# Patient Record
Sex: FEMALE | Race: WHITE | NOT HISPANIC OR LATINO | Employment: PART TIME | URBAN - METROPOLITAN AREA
[De-identification: names, ages, dates, MRNs, and addresses within clinical notes are randomized per-mention and may not be internally consistent; named-entity substitution may affect disease eponyms.]

---

## 2017-03-24 ENCOUNTER — APPOINTMENT (OUTPATIENT)
Dept: PREADMISSION TESTING | Facility: HOSPITAL | Age: 47
End: 2017-03-24

## 2017-03-24 VITALS
OXYGEN SATURATION: 100 % | HEART RATE: 65 BPM | TEMPERATURE: 97.8 F | RESPIRATION RATE: 16 BRPM | BODY MASS INDEX: 24.34 KG/M2 | DIASTOLIC BLOOD PRESSURE: 81 MMHG | WEIGHT: 160.6 LBS | SYSTOLIC BLOOD PRESSURE: 121 MMHG | HEIGHT: 68 IN

## 2017-03-24 LAB
BASOPHILS # BLD AUTO: 0.04 10*3/MM3 (ref 0–0.2)
BASOPHILS NFR BLD AUTO: 0.5 % (ref 0–1.5)
DEPRECATED RDW RBC AUTO: 44 FL (ref 37–54)
EOSINOPHIL # BLD AUTO: 0.05 10*3/MM3 (ref 0–0.7)
EOSINOPHIL NFR BLD AUTO: 0.6 % (ref 0.3–6.2)
ERYTHROCYTE [DISTWIDTH] IN BLOOD BY AUTOMATED COUNT: 12.8 % (ref 11.7–13)
HCT VFR BLD AUTO: 40.4 % (ref 35.6–45.5)
HGB BLD-MCNC: 13.5 G/DL (ref 11.9–15.5)
IMM GRANULOCYTES # BLD: 0.03 10*3/MM3 (ref 0–0.03)
IMM GRANULOCYTES NFR BLD: 0.3 % (ref 0–0.5)
LYMPHOCYTES # BLD AUTO: 2.37 10*3/MM3 (ref 0.9–4.8)
LYMPHOCYTES NFR BLD AUTO: 26.8 % (ref 19.6–45.3)
MCH RBC QN AUTO: 32.1 PG (ref 26.9–32)
MCHC RBC AUTO-ENTMCNC: 33.4 G/DL (ref 32.4–36.3)
MCV RBC AUTO: 96 FL (ref 80.5–98.2)
MONOCYTES # BLD AUTO: 0.56 10*3/MM3 (ref 0.2–1.2)
MONOCYTES NFR BLD AUTO: 6.3 % (ref 5–12)
NEUTROPHILS # BLD AUTO: 5.78 10*3/MM3 (ref 1.9–8.1)
NEUTROPHILS NFR BLD AUTO: 65.5 % (ref 42.7–76)
PLATELET # BLD AUTO: 276 10*3/MM3 (ref 140–500)
PMV BLD AUTO: 10.7 FL (ref 6–12)
RBC # BLD AUTO: 4.21 10*6/MM3 (ref 3.9–5.2)
WBC NRBC COR # BLD: 8.83 10*3/MM3 (ref 4.5–10.7)

## 2017-03-24 PROCEDURE — 85025 COMPLETE CBC W/AUTO DIFF WBC: CPT | Performed by: OBSTETRICS & GYNECOLOGY

## 2017-03-24 PROCEDURE — 36415 COLL VENOUS BLD VENIPUNCTURE: CPT

## 2017-03-24 RX ORDER — NORETHINDRONE ACETATE AND ETHINYL ESTRADIOL .03; 1.5 MG/1; MG/1
1 TABLET ORAL DAILY
COMMUNITY
End: 2021-08-20

## 2017-03-24 RX ORDER — LEVOTHYROXINE SODIUM 0.03 MG/1
25 TABLET ORAL DAILY
COMMUNITY
End: 2022-02-02 | Stop reason: SDUPTHER

## 2017-03-24 RX ORDER — ROPINIROLE 0.5 MG/1
1 TABLET, FILM COATED ORAL NIGHTLY
COMMUNITY
End: 2021-08-20

## 2017-03-24 NOTE — DISCHARGE INSTRUCTIONS
General Instructions:  • Do not eat or drink after midnight: includes water, mints, or gum. You may brush your teeth.  • Do not smoke, chew tobacco, or drink alcohol.  • The Pre-Admission Testing nurse will instruct you to bring medications if unable to obtain an accurate list in Pre-Admission Testing.    • If applicable bring your C-PAP/ BI-PAP machine.  • Bring any papers given to you in the doctor’s office.  • Wear clean comfortable clothes and socks.  • Do not wear contact lenses or make-up.  Bring a case for your glasses if applicable.   • Bring crutches or walker if applicable.  • Leave all other valuables and jewelry at home.    If you were given a blood bank ID arm band remember to bring it with you the day of surgery.    Preventing a Surgical Site Infection:  Shower on the morning of surgery using a fresh bar of anti-bacterial soap (such as Dial) and clean washcloth.  Dry with a clean towel and dress in clean clothing.  For 2 to 3 days before surgery, avoid shaving with a razor near where you will have surgery because the razor can irritate skin and make it easier to develop an infection  Ask your surgeon if you will be receiving antibiotics prior to surgery  Make sure you, your family, and all healthcare providers clean their hands with soap and water or an alcohol based hand  before caring for you or your wound  If at all possible, quit smoking as many days before surgery as you can.    Day of surgery:  Upon arrival, a Pre-op nurse and Anesthesiologist will review your health history, obtain vital signs, and answer questions you may have.  The only belongings needed at this time will be your home medications and if applicable your C-PAP/BI-PAP machine.  If you are staying overnight your family can leave the rest of your belongings in the car and bring them to your room later.  A Pre-op nurse will start an IV and you may receive medication in preparation for surgery, including something to  help you relax.  Your family will be able to see you in the Pre-op area.  While you are in surgery your family should notify the waiting room  if they leave the waiting room area and provide a contact phone number.    Please be aware that surgery does come with discomfort.  We want to make every effort to control your discomfort so please discuss any uncontrolled symptoms with your nurse.   Your doctor will most likely have prescribed pain medications.      If you are going home after surgery you will receive individualized written care instructions before being discharged.  A responsible adult must drive you to and from the hospital on the day of your surgery and stay with you for 24 hours.    If you are staying overnight following surgery, you will be transported to your hospital room following the recovery period.  [facility] has all private rooms.    If you have any questions please call Pre-Admission Testing at [phone].  Deductibles and co-payments are collected on the day of service. Please be prepared to pay the required co-pay, deductible or deposit on the day of service as defined by your plan.

## 2017-04-02 NOTE — H&P
HISTORY OF PRESENT ILLNESS: Patient is a 47-year-old female, G2, P0, A1, ectopic 1 with hysteroscopically documented high-grade cervical dysplasia.  Plan is for LEEP conization.     ALLERGIES:  1.  LATEX.   2.  SULFA.   3.  BACITRACIN.    4.  CODEINE.    5.  CORTISONE.    6.  FORMALDEHYDE.    7.  POLYMYXIN.     PAST SURGICAL HISTORY:  1.  Endometrial ablation.    2.  Diagnostic laparoscopy.    3.  Treatment of an ectopic pregnancy.   4.  Tubal ligation.     CURRENT MEDICATIONS:   1.  Gildess 1.5/30.  2.  Synthroid 25 mcg daily.     PHYSICAL EXAMINATION:  VITAL SIGNS: Blood pressure is 116/62. Weight is 161 pounds at 5 feet 8 inches for a body mass index of 24.   LUNGS: Clear.   HEART: Regular rate without murmur.   ABDOMEN: Soft. No masses.   PELVIC: Normal external genitalia. Cervix is nulliparous in appearance. Uterus is midplane. No adnexal masses. Rectovaginal is confirmatory.     IMPRESSION: High-grade cervical dysplasia.     PLAN: LEEP conization. The patient understands all of the risks and benefits of surgery. She understands the risk of bleeding, infection, injury to bowel or bladder. She accepts these risks and wishes to proceed.       Oly Rivera M.D. MC:deyvi  D:   04/02/2017 17:51:09  T:   04/02/2017 19:17:08  Job ID:   [id number]  Document ID:   [id number]  cc:   OPT Surgery OPT Surgery  [facility]

## 2017-04-03 ENCOUNTER — ANESTHESIA EVENT (OUTPATIENT)
Dept: PERIOP | Facility: HOSPITAL | Age: 47
End: 2017-04-03

## 2017-04-03 ENCOUNTER — HOSPITAL ENCOUNTER (OUTPATIENT)
Facility: HOSPITAL | Age: 47
Setting detail: HOSPITAL OUTPATIENT SURGERY
Discharge: HOME | End: 2017-04-03
Attending: OBSTETRICS & GYNECOLOGY | Admitting: OBSTETRICS & GYNECOLOGY

## 2017-04-03 ENCOUNTER — ANESTHESIA (OUTPATIENT)
Dept: PERIOP | Facility: HOSPITAL | Age: 47
End: 2017-04-03

## 2017-04-03 VITALS
OXYGEN SATURATION: 97 % | DIASTOLIC BLOOD PRESSURE: 81 MMHG | TEMPERATURE: 97.6 F | HEART RATE: 50 BPM | RESPIRATION RATE: 16 BRPM | SYSTOLIC BLOOD PRESSURE: 122 MMHG

## 2017-04-03 DIAGNOSIS — R87.613 HIGH GRADE SQUAMOUS INTRAEPITHELIAL CERVICAL DYSPLASIA: ICD-10-CM

## 2017-04-03 DIAGNOSIS — D06.9 SEVERE CERVICAL DYSPLASIA, HISTOLOGICALLY CONFIRMED: Primary | ICD-10-CM

## 2017-04-03 LAB
B-HCG UR QL: NEGATIVE
INTERNAL NEGATIVE CONTROL: NEGATIVE
INTERNAL POSITIVE CONTROL: POSITIVE
Lab: NORMAL

## 2017-04-03 PROCEDURE — 25010000002 FENTANYL CITRATE (PF) 100 MCG/2ML SOLUTION: Performed by: NURSE ANESTHETIST, CERTIFIED REGISTERED

## 2017-04-03 PROCEDURE — 25010000002 ROPIVACAINE PER 1 MG: Performed by: OBSTETRICS & GYNECOLOGY

## 2017-04-03 PROCEDURE — 25010000002 KETOROLAC TROMETHAMINE PER 15 MG: Performed by: NURSE ANESTHETIST, CERTIFIED REGISTERED

## 2017-04-03 PROCEDURE — 25010000002 MIDAZOLAM PER 1 MG: Performed by: ANESTHESIOLOGY

## 2017-04-03 PROCEDURE — 88305 TISSUE EXAM BY PATHOLOGIST: CPT | Performed by: OBSTETRICS & GYNECOLOGY

## 2017-04-03 PROCEDURE — 88307 TISSUE EXAM BY PATHOLOGIST: CPT | Performed by: OBSTETRICS & GYNECOLOGY

## 2017-04-03 PROCEDURE — 25010000002 PROPOFOL 10 MG/ML EMULSION: Performed by: NURSE ANESTHETIST, CERTIFIED REGISTERED

## 2017-04-03 RX ORDER — PROMETHAZINE HYDROCHLORIDE 25 MG/ML
5 INJECTION, SOLUTION INTRAMUSCULAR; INTRAVENOUS
Status: DISCONTINUED | OUTPATIENT
Start: 2017-04-03 | End: 2017-04-04 | Stop reason: HOSPADM

## 2017-04-03 RX ORDER — HYDRALAZINE HYDROCHLORIDE 20 MG/ML
5 INJECTION INTRAMUSCULAR; INTRAVENOUS
Status: DISCONTINUED | OUTPATIENT
Start: 2017-04-03 | End: 2017-04-04 | Stop reason: HOSPADM

## 2017-04-03 RX ORDER — MIDAZOLAM HYDROCHLORIDE 1 MG/ML
1 INJECTION INTRAMUSCULAR; INTRAVENOUS
Status: DISCONTINUED | OUTPATIENT
Start: 2017-04-03 | End: 2017-04-03 | Stop reason: HOSPADM

## 2017-04-03 RX ORDER — FENTANYL CITRATE 50 UG/ML
INJECTION, SOLUTION INTRAMUSCULAR; INTRAVENOUS AS NEEDED
Status: DISCONTINUED | OUTPATIENT
Start: 2017-04-03 | End: 2017-04-03 | Stop reason: SURG

## 2017-04-03 RX ORDER — ROPIVACAINE HYDROCHLORIDE 5 MG/ML
INJECTION, SOLUTION EPIDURAL; INFILTRATION; PERINEURAL AS NEEDED
Status: DISCONTINUED | OUTPATIENT
Start: 2017-04-03 | End: 2017-04-03 | Stop reason: HOSPADM

## 2017-04-03 RX ORDER — PROMETHAZINE HYDROCHLORIDE 25 MG/1
12.5 TABLET ORAL ONCE AS NEEDED
Status: DISCONTINUED | OUTPATIENT
Start: 2017-04-03 | End: 2017-04-04 | Stop reason: HOSPADM

## 2017-04-03 RX ORDER — LABETALOL HYDROCHLORIDE 5 MG/ML
5 INJECTION, SOLUTION INTRAVENOUS
Status: DISCONTINUED | OUTPATIENT
Start: 2017-04-03 | End: 2017-04-04 | Stop reason: HOSPADM

## 2017-04-03 RX ORDER — SODIUM CHLORIDE 0.9 % (FLUSH) 0.9 %
1-10 SYRINGE (ML) INJECTION AS NEEDED
Status: DISCONTINUED | OUTPATIENT
Start: 2017-04-03 | End: 2017-04-03 | Stop reason: HOSPADM

## 2017-04-03 RX ORDER — FENTANYL CITRATE 50 UG/ML
50 INJECTION, SOLUTION INTRAMUSCULAR; INTRAVENOUS
Status: DISCONTINUED | OUTPATIENT
Start: 2017-04-03 | End: 2017-04-04 | Stop reason: HOSPADM

## 2017-04-03 RX ORDER — PROMETHAZINE HYDROCHLORIDE 25 MG/ML
12.5 INJECTION, SOLUTION INTRAMUSCULAR; INTRAVENOUS ONCE AS NEEDED
Status: DISCONTINUED | OUTPATIENT
Start: 2017-04-03 | End: 2017-04-04 | Stop reason: HOSPADM

## 2017-04-03 RX ORDER — DIPHENHYDRAMINE HYDROCHLORIDE 50 MG/ML
12.5 INJECTION INTRAMUSCULAR; INTRAVENOUS
Status: DISCONTINUED | OUTPATIENT
Start: 2017-04-03 | End: 2017-04-04 | Stop reason: HOSPADM

## 2017-04-03 RX ORDER — IBUPROFEN 600 MG/1
600 TABLET ORAL EVERY 6 HOURS PRN
Status: DISCONTINUED | OUTPATIENT
Start: 2017-04-03 | End: 2017-04-04 | Stop reason: HOSPADM

## 2017-04-03 RX ORDER — SODIUM CHLORIDE, SODIUM LACTATE, POTASSIUM CHLORIDE, CALCIUM CHLORIDE 600; 310; 30; 20 MG/100ML; MG/100ML; MG/100ML; MG/100ML
INJECTION, SOLUTION INTRAVENOUS CONTINUOUS PRN
Status: DISCONTINUED | OUTPATIENT
Start: 2017-04-03 | End: 2017-04-03 | Stop reason: SURG

## 2017-04-03 RX ORDER — ONDANSETRON 2 MG/ML
4 INJECTION INTRAMUSCULAR; INTRAVENOUS ONCE AS NEEDED
Status: DISCONTINUED | OUTPATIENT
Start: 2017-04-03 | End: 2017-04-04 | Stop reason: HOSPADM

## 2017-04-03 RX ORDER — HYDROMORPHONE HYDROCHLORIDE 1 MG/ML
0.5 INJECTION, SOLUTION INTRAMUSCULAR; INTRAVENOUS; SUBCUTANEOUS
Status: DISCONTINUED | OUTPATIENT
Start: 2017-04-03 | End: 2017-04-04 | Stop reason: HOSPADM

## 2017-04-03 RX ORDER — FENTANYL CITRATE 50 UG/ML
50 INJECTION, SOLUTION INTRAMUSCULAR; INTRAVENOUS
Status: DISCONTINUED | OUTPATIENT
Start: 2017-04-03 | End: 2017-04-03 | Stop reason: HOSPADM

## 2017-04-03 RX ORDER — MIDAZOLAM HYDROCHLORIDE 1 MG/ML
2 INJECTION INTRAMUSCULAR; INTRAVENOUS
Status: DISCONTINUED | OUTPATIENT
Start: 2017-04-03 | End: 2017-04-03 | Stop reason: HOSPADM

## 2017-04-03 RX ORDER — PROMETHAZINE HYDROCHLORIDE 25 MG/1
25 SUPPOSITORY RECTAL ONCE AS NEEDED
Status: DISCONTINUED | OUTPATIENT
Start: 2017-04-03 | End: 2017-04-04 | Stop reason: HOSPADM

## 2017-04-03 RX ORDER — FAMOTIDINE 10 MG/ML
20 INJECTION, SOLUTION INTRAVENOUS ONCE
Status: COMPLETED | OUTPATIENT
Start: 2017-04-03 | End: 2017-04-03

## 2017-04-03 RX ORDER — PROMETHAZINE HYDROCHLORIDE 25 MG/1
25 TABLET ORAL ONCE AS NEEDED
Status: DISCONTINUED | OUTPATIENT
Start: 2017-04-03 | End: 2017-04-04 | Stop reason: HOSPADM

## 2017-04-03 RX ORDER — LIDOCAINE HYDROCHLORIDE 20 MG/ML
INJECTION, SOLUTION INFILTRATION; PERINEURAL AS NEEDED
Status: DISCONTINUED | OUTPATIENT
Start: 2017-04-03 | End: 2017-04-03 | Stop reason: SURG

## 2017-04-03 RX ORDER — SODIUM CHLORIDE, SODIUM LACTATE, POTASSIUM CHLORIDE, CALCIUM CHLORIDE 600; 310; 30; 20 MG/100ML; MG/100ML; MG/100ML; MG/100ML
9 INJECTION, SOLUTION INTRAVENOUS CONTINUOUS
Status: DISCONTINUED | OUTPATIENT
Start: 2017-04-03 | End: 2017-04-04 | Stop reason: HOSPADM

## 2017-04-03 RX ORDER — NALOXONE HCL 0.4 MG/ML
0.2 VIAL (ML) INJECTION AS NEEDED
Status: DISCONTINUED | OUTPATIENT
Start: 2017-04-03 | End: 2017-04-04 | Stop reason: HOSPADM

## 2017-04-03 RX ORDER — PROPOFOL 10 MG/ML
VIAL (ML) INTRAVENOUS AS NEEDED
Status: DISCONTINUED | OUTPATIENT
Start: 2017-04-03 | End: 2017-04-03 | Stop reason: SURG

## 2017-04-03 RX ORDER — KETOROLAC TROMETHAMINE 30 MG/ML
INJECTION, SOLUTION INTRAMUSCULAR; INTRAVENOUS AS NEEDED
Status: DISCONTINUED | OUTPATIENT
Start: 2017-04-03 | End: 2017-04-03 | Stop reason: SURG

## 2017-04-03 RX ORDER — FLUMAZENIL 0.1 MG/ML
0.2 INJECTION INTRAVENOUS AS NEEDED
Status: DISCONTINUED | OUTPATIENT
Start: 2017-04-03 | End: 2017-04-04 | Stop reason: HOSPADM

## 2017-04-03 RX ADMIN — PROPOFOL 150 MG: 10 INJECTION, EMULSION INTRAVENOUS at 07:08

## 2017-04-03 RX ADMIN — FAMOTIDINE 20 MG: 10 INJECTION, SOLUTION INTRAVENOUS at 06:59

## 2017-04-03 RX ADMIN — KETOROLAC TROMETHAMINE 30 MG: 30 INJECTION, SOLUTION INTRAMUSCULAR; INTRAVENOUS at 07:19

## 2017-04-03 RX ADMIN — LIDOCAINE HYDROCHLORIDE 100 MG: 20 INJECTION, SOLUTION INFILTRATION; PERINEURAL at 07:08

## 2017-04-03 RX ADMIN — FENTANYL CITRATE 50 MCG: 50 INJECTION INTRAMUSCULAR; INTRAVENOUS at 07:08

## 2017-04-03 RX ADMIN — SODIUM CHLORIDE, POTASSIUM CHLORIDE, SODIUM LACTATE AND CALCIUM CHLORIDE: 600; 310; 30; 20 INJECTION, SOLUTION INTRAVENOUS at 06:11

## 2017-04-03 RX ADMIN — SODIUM CHLORIDE, POTASSIUM CHLORIDE, SODIUM LACTATE AND CALCIUM CHLORIDE 1000 ML: 600; 310; 30; 20 INJECTION, SOLUTION INTRAVENOUS at 06:58

## 2017-04-03 RX ADMIN — MIDAZOLAM 2 MG: 1 INJECTION INTRAMUSCULAR; INTRAVENOUS at 06:58

## 2017-04-03 NOTE — PLAN OF CARE
Problem: Patient Care Overview (Adult)  Goal: Plan of Care Review  Outcome: Ongoing (interventions implemented as appropriate)    04/03/17 0548   Coping/Psychosocial Response Interventions   Plan Of Care Reviewed With patient   Patient Care Overview   Progress improving       Goal: Adult Individualization and Mutuality  Outcome: Ongoing (interventions implemented as appropriate)  Goal: Discharge Needs Assessment  Outcome: Ongoing (interventions implemented as appropriate)    04/03/17 0548   Discharge Needs Assessment   Concerns To Be Addressed no discharge needs identified

## 2017-04-03 NOTE — ANESTHESIA POSTPROCEDURE EVALUATION
Patient: Mercy Dalton    Procedure Summary     Date Anesthesia Start Anesthesia Stop Room / Location    04/03/17 0702 0738  [facility]  /  [facility]       Procedure Diagnosis Surgeon Provider    LOOP ELECTROCAUTERY EXCISION PROCEDURE (N/A Cervix) No diagnosis on file. MD Yolanda Tyler MD          Anesthesia Type: general  Last vitals  /81 (04/03/17 0835)    Temp 36.4 °C (97.6 °F) (04/03/17 0815)    Pulse 50 (04/03/17 0835)   Resp 16 (04/03/17 0835)    SpO2 97 % (04/03/17 0835)      Post Anesthesia Care and Evaluation    Patient location during evaluation: bedside  Patient participation: complete - patient participated  Level of consciousness: awake and alert  Pain management: adequate  Airway patency: patent  Anesthetic complications: No anesthetic complications    Cardiovascular status: acceptable  Respiratory status: acceptable  Hydration status: acceptable

## 2017-04-03 NOTE — PLAN OF CARE
Problem: Patient Care Overview (Adult)  Goal: Plan of Care Review  Outcome: Ongoing (interventions implemented as appropriate)    04/03/17 0800   Coping/Psychosocial Response Interventions   Plan Of Care Reviewed With patient   Patient Care Overview   Progress improving   Outcome Evaluation   Outcome Summary/Follow up Plan Vital signs stable,pain free, taking po fluids       Goal: Adult Individualization and Mutuality  Outcome: Ongoing (interventions implemented as appropriate)  Goal: Discharge Needs Assessment  Outcome: Ongoing (interventions implemented as appropriate)    04/03/17 0800   Discharge Needs Assessment   Concerns To Be Addressed no discharge needs identified

## 2017-04-03 NOTE — PLAN OF CARE
Problem: Perioperative Period (Adult)  Goal: Signs and Symptoms of Listed Potential Problems Will be Absent or Manageable (Perioperative Period)  Outcome: Ongoing (interventions implemented as appropriate)    04/03/17 0800   Perioperative Period   Problems Assessed (Perioperative Period) all   Problems Present (Perioperative Period) none

## 2017-04-03 NOTE — OP NOTE
Date of Procedure:  04/03/2017    PREOPERATIVE DIAGNOSIS: High grade cervical dysplasia.     POSTOPERATIVE DIAGNOSIS: High grade cervical dysplasia.     PROCEDURE PERFORMED: Loop electrosurgical excision procedure conization.    SURGEON: Oly Rivera MD     ANESTHESIA: General laryngeal mask.    COMPLICATIONS: None.     FINDINGS: Parous-appearing cervix.    DESCRIPTION OF PROCEDURE: After adequate general laryngeal mask anesthesia, the patient was placed in the dorsal lithotomy supine position and prepped and draped in the usual fashion for a vaginal procedure. Speculum was placed in the vagina. A long Allis was placed on the anterior lip of the cervix. The blue wire loop was used on 100 romero of cutting power to remove the cone-shaped specimen that was 4 cm in diameter and 1 cm in depth. Sharp curettage of the endocervix was undertaken. The cone bed was cauterized. There was still some bleeding. Therefore, 2 Sturmdorf sutures were placed of 0 chromic and Surgicel was placed in the cone bed and the 2 sutures were tied across themselves. This resulted in adequate hemostasis. Then, 10 mL of 0.5% Naropin was instilled in the cervix at the 12, 3, and 9 oclock positions for a total of 30 mL. Estimated blood loss was 20 mL.    COMPLICATIONS: None.      Oly Rivera M.D. MC:jessa  D:   04/03/2017 07:36:52  T:   04/03/2017 10:54:42  Job ID:   [id number]  Document ID:   [id number]  cc:

## 2017-04-03 NOTE — BRIEF OP NOTE
LOOP ELECTROCAUTERY EXCISION PROCEDURE  Procedure Note    Mercy Dalton  4/3/2017    Pre-op Diagnosis:   hgsil    Post-op Diagnosis:     same    Procedure/CPT® Codes:      Procedure(s):  LOOP ELECTROCAUTERY EXCISION PROCEDURE, ECC    Surgeon(s):  Oly Rivera MD    Anesthesia: General    Staff:   Circulator: Cheryl Lei RN  Scrub Person: Shawn Kimbrough    Estimated Blood Loss: 20cc  Urine Voided: zero    Specimens:                  ID Type Source Tests Collected by Time Destination   A : endometrial curretage Tissue Endometrial Curettings TISSUE EXAM Oly Rivera MD 4/3/2017 0721    B :  Tissue Cervix TISSUE EXAM Oly Rivera MD 4/3/2017 0726          Drains:           Findings: parous cervix    Complications: none      Oly Rivera MD     Date: 4/3/2017  Time: 7:26 AM

## 2017-04-03 NOTE — ANESTHESIA PROCEDURE NOTES
Airway  Urgency: elective    Difficult airway    General Information and Staff    Patient location during procedure: OR  Anesthesiologist: ZEYAD MCNEAL  CRNA: TAWNY POMPA    Indications and Patient Condition  Indications for airway management: airway protection    Preoxygenated: yes  MILS maintained throughout  Mask difficulty assessment: 1 - vent by mask    Final Airway Details  Final airway type: supraglottic airway      Successful airway: ProSeal  Size 4    Number of attempts at approach: 1    Additional Comments  Smooth iv induction with no complications

## 2017-04-03 NOTE — ANESTHESIA PREPROCEDURE EVALUATION
Anesthesia Evaluation     Patient summary reviewed   NPO Status: > 8 hours   Airway   Mallampati: II  no difficulty expected  Dental - normal exam     Pulmonary - normal exam   Cardiovascular - normal exam        Neuro/Psych  GI/Hepatic/Renal/Endo    (+)  hypothyroidism,     Musculoskeletal     Abdominal    Substance History      OB/GYN          Other                                    Anesthesia Plan    ASA 2     general     Anesthetic plan and risks discussed with patient.

## 2017-04-03 NOTE — PLAN OF CARE
Problem: Perioperative Period (Adult)  Goal: Signs and Symptoms of Listed Potential Problems Will be Absent or Manageable (Perioperative Period)  Outcome: Ongoing (interventions implemented as appropriate)    04/03/17 0562   Perioperative Period   Problems Assessed (Perioperative Period) all   Problems Present (Perioperative Period) none

## 2017-04-04 LAB
CYTO UR: NORMAL
LAB AP CASE REPORT: NORMAL
Lab: NORMAL
PATH REPORT.FINAL DX SPEC: NORMAL
PATH REPORT.GROSS SPEC: NORMAL

## 2018-01-08 ENCOUNTER — CONVERSION ENCOUNTER (OUTPATIENT)
Dept: GENERAL RADIOLOGY | Facility: HOSPITAL | Age: 48
End: 2018-01-08

## 2018-04-13 ENCOUNTER — OFFICE VISIT CONVERTED (OUTPATIENT)
Dept: FAMILY MEDICINE CLINIC | Facility: CLINIC | Age: 48
End: 2018-04-13
Attending: NURSE PRACTITIONER

## 2018-06-27 ENCOUNTER — OFFICE VISIT CONVERTED (OUTPATIENT)
Dept: FAMILY MEDICINE CLINIC | Facility: CLINIC | Age: 48
End: 2018-06-27
Attending: NURSE PRACTITIONER

## 2018-07-12 ENCOUNTER — CONVERSION ENCOUNTER (OUTPATIENT)
Dept: NEUROLOGY | Facility: CLINIC | Age: 48
End: 2018-07-12

## 2018-07-12 ENCOUNTER — OFFICE VISIT CONVERTED (OUTPATIENT)
Dept: NEUROLOGY | Facility: CLINIC | Age: 48
End: 2018-07-12
Attending: PSYCHIATRY & NEUROLOGY

## 2018-09-24 ENCOUNTER — OFFICE VISIT CONVERTED (OUTPATIENT)
Dept: PLASTIC SURGERY | Facility: CLINIC | Age: 48
End: 2018-09-24
Attending: PLASTIC SURGERY

## 2018-10-15 ENCOUNTER — OFFICE VISIT CONVERTED (OUTPATIENT)
Dept: NEUROLOGY | Facility: CLINIC | Age: 48
End: 2018-10-15
Attending: PSYCHIATRY & NEUROLOGY

## 2018-10-25 ENCOUNTER — PROCEDURE VISIT CONVERTED (OUTPATIENT)
Dept: PLASTIC SURGERY | Facility: CLINIC | Age: 48
End: 2018-10-25
Attending: PLASTIC SURGERY

## 2018-11-01 ENCOUNTER — CONVERSION ENCOUNTER (OUTPATIENT)
Dept: PLASTIC SURGERY | Facility: CLINIC | Age: 48
End: 2018-11-01

## 2018-11-01 ENCOUNTER — OFFICE VISIT CONVERTED (OUTPATIENT)
Dept: PLASTIC SURGERY | Facility: CLINIC | Age: 48
End: 2018-11-01
Attending: PLASTIC SURGERY

## 2019-02-14 ENCOUNTER — OFFICE VISIT CONVERTED (OUTPATIENT)
Dept: NEUROLOGY | Facility: CLINIC | Age: 49
End: 2019-02-14
Attending: PHYSICIAN ASSISTANT

## 2019-03-07 ENCOUNTER — HOSPITAL ENCOUNTER (OUTPATIENT)
Dept: OTHER | Facility: HOSPITAL | Age: 49
Discharge: HOME | End: 2019-03-07
Attending: NURSE PRACTITIONER

## 2019-03-07 ENCOUNTER — HOSPITAL ENCOUNTER (OUTPATIENT)
Dept: OTHER | Facility: HOSPITAL | Age: 49
Discharge: HOME | End: 2019-03-07

## 2019-03-07 LAB
ALBUMIN SERPL-MCNC: 4.1 G/DL (ref 3.5–5)
ALBUMIN/GLOB SERPL: 1.3 {RATIO} (ref 1.4–2.6)
ALP SERPL-CCNC: 40 U/L (ref 42–98)
ALT SERPL-CCNC: 16 U/L (ref 10–40)
ANION GAP SERPL CALC-SCNC: 14 MMOL/L (ref 8–19)
AST SERPL-CCNC: 39 U/L (ref 15–50)
BASOPHILS # BLD AUTO: 0.07 10*3/UL (ref 0–0.2)
BASOPHILS NFR BLD AUTO: 1.1 % (ref 0–3)
BILIRUB SERPL-MCNC: 0.5 MG/DL (ref 0.2–1.3)
BUN SERPL-MCNC: 12 MG/DL (ref 5–25)
BUN/CREAT SERPL: 16 {RATIO} (ref 6–20)
CALCIUM SERPL-MCNC: 9.5 MG/DL (ref 8.7–10.4)
CHLORIDE SERPL-SCNC: 103 MMOL/L (ref 99–111)
CHOLEST SERPL-MCNC: 181 MG/DL (ref 107–200)
CHOLEST/HDLC SERPL: 3.1 {RATIO} (ref 3–6)
CONV ABS IMM GRAN: 0.02 10*3/UL (ref 0–0.2)
CONV CO2: 27 MMOL/L (ref 22–32)
CONV IMMATURE GRAN: 0.3 % (ref 0–1.8)
CONV TOTAL PROTEIN: 7.2 G/DL (ref 6.3–8.2)
CREAT UR-MCNC: 0.73 MG/DL (ref 0.5–0.9)
DEPRECATED RDW RBC AUTO: 43.7 FL (ref 36.4–46.3)
EOSINOPHIL # BLD AUTO: 0.1 10*3/UL (ref 0–0.7)
EOSINOPHIL # BLD AUTO: 1.5 % (ref 0–7)
ERYTHROCYTE [DISTWIDTH] IN BLOOD BY AUTOMATED COUNT: 12.1 % (ref 11.7–14.4)
FERRITIN SERPL-MCNC: 77 NG/ML (ref 10–200)
FOLATE SERPL-MCNC: 13.4 NG/ML (ref 4.8–20)
GFR SERPLBLD BASED ON 1.73 SQ M-ARVRAT: >60 ML/MIN/{1.73_M2}
GLOBULIN UR ELPH-MCNC: 3.1 G/DL (ref 2–3.5)
GLUCOSE SERPL-MCNC: 96 MG/DL (ref 65–99)
HBA1C MFR BLD: 13.2 G/DL (ref 12–16)
HCT VFR BLD AUTO: 40.5 % (ref 37–47)
HDLC SERPL-MCNC: 59 MG/DL (ref 40–60)
IRON SATN MFR SERPL: 51 % (ref 20–55)
IRON SERPL-MCNC: 189 UG/DL (ref 60–170)
LDLC SERPL CALC-MCNC: 108 MG/DL (ref 70–100)
LYMPHOCYTES # BLD AUTO: 2.3 10*3/UL (ref 1–5)
MCH RBC QN AUTO: 31.8 PG (ref 27–31)
MCHC RBC AUTO-ENTMCNC: 32.6 G/DL (ref 33–37)
MCV RBC AUTO: 97.6 FL (ref 81–99)
MONOCYTES # BLD AUTO: 0.51 10*3/UL (ref 0.2–1.2)
MONOCYTES NFR BLD AUTO: 7.8 % (ref 3–10)
NEUTROPHILS # BLD AUTO: 3.54 10*3/UL (ref 2–8)
NEUTROPHILS NFR BLD AUTO: 54.1 % (ref 30–85)
NRBC CBCN: 0 % (ref 0–0.7)
OSMOLALITY SERPL CALC.SUM OF ELEC: 288 MOSM/KG (ref 273–304)
PLATELET # BLD AUTO: 265 10*3/UL (ref 130–400)
PMV BLD AUTO: 11.6 FL (ref 9.4–12.3)
POTASSIUM SERPL-SCNC: 4.5 MMOL/L (ref 3.5–5.3)
RBC # BLD AUTO: 4.15 10*6/UL (ref 4.2–5.4)
SODIUM SERPL-SCNC: 139 MMOL/L (ref 135–147)
T4 FREE SERPL-MCNC: 1.1 NG/DL (ref 0.9–1.8)
TIBC SERPL-MCNC: 369 UG/DL (ref 245–450)
TRANSFERRIN SERPL-MCNC: 258 MG/DL (ref 250–380)
TRIGL SERPL-MCNC: 71 MG/DL (ref 40–150)
TSH SERPL-ACNC: 2.53 M[IU]/L (ref 0.27–4.2)
VARIANT LYMPHS NFR BLD MANUAL: 35.2 % (ref 20–45)
VIT B12 SERPL-MCNC: 984 PG/ML (ref 211–911)
VLDLC SERPL-MCNC: 14 MG/DL (ref 5–37)
WBC # BLD AUTO: 6.54 10*3/UL (ref 4.8–10.8)

## 2019-04-01 ENCOUNTER — HOSPITAL ENCOUNTER (OUTPATIENT)
Dept: GENERAL RADIOLOGY | Facility: HOSPITAL | Age: 49
Discharge: HOME | End: 2019-04-01
Attending: FAMILY MEDICINE

## 2019-04-01 ENCOUNTER — OFFICE VISIT CONVERTED (OUTPATIENT)
Dept: FAMILY MEDICINE CLINIC | Facility: CLINIC | Age: 49
End: 2019-04-01
Attending: FAMILY MEDICINE

## 2019-04-17 ENCOUNTER — OFFICE VISIT CONVERTED (OUTPATIENT)
Dept: FAMILY MEDICINE CLINIC | Facility: CLINIC | Age: 49
End: 2019-04-17
Attending: NURSE PRACTITIONER

## 2019-04-17 ENCOUNTER — CONVERSION ENCOUNTER (OUTPATIENT)
Dept: FAMILY MEDICINE CLINIC | Facility: CLINIC | Age: 49
End: 2019-04-17

## 2019-04-24 ENCOUNTER — HOSPITAL ENCOUNTER (OUTPATIENT)
Dept: LAB | Facility: HOSPITAL | Age: 49
Discharge: HOME | End: 2019-04-24
Attending: NURSE PRACTITIONER

## 2019-04-24 LAB
FERRITIN SERPL-MCNC: 89 NG/ML (ref 10–200)
IRON SERPL-MCNC: 98 UG/DL (ref 60–170)
TRANSFERRIN SERPL-MCNC: 250 MG/DL (ref 250–380)

## 2019-07-10 ENCOUNTER — HOSPITAL ENCOUNTER (OUTPATIENT)
Dept: GENERAL RADIOLOGY | Facility: HOSPITAL | Age: 49
Discharge: HOME | End: 2019-07-10
Attending: NURSE PRACTITIONER

## 2019-09-09 ENCOUNTER — OFFICE VISIT CONVERTED (OUTPATIENT)
Dept: NEUROLOGY | Facility: CLINIC | Age: 49
End: 2019-09-09
Attending: PSYCHIATRY & NEUROLOGY

## 2019-11-21 ENCOUNTER — HOSPITAL ENCOUNTER (OUTPATIENT)
Dept: CT IMAGING | Facility: HOSPITAL | Age: 49
Discharge: HOME | End: 2019-11-21
Attending: NURSE PRACTITIONER

## 2019-11-21 ENCOUNTER — OFFICE VISIT CONVERTED (OUTPATIENT)
Dept: FAMILY MEDICINE CLINIC | Facility: CLINIC | Age: 49
End: 2019-11-21
Attending: NURSE PRACTITIONER

## 2019-11-21 ENCOUNTER — HOSPITAL ENCOUNTER (OUTPATIENT)
Dept: FAMILY MEDICINE CLINIC | Facility: CLINIC | Age: 49
Discharge: HOME | End: 2019-11-21
Attending: NURSE PRACTITIONER

## 2019-11-21 LAB
ALBUMIN SERPL-MCNC: 4.2 G/DL (ref 3.5–5)
ALBUMIN/GLOB SERPL: 1.2 {RATIO} (ref 1.4–2.6)
ALP SERPL-CCNC: 50 U/L (ref 53–141)
ALT SERPL-CCNC: 15 U/L (ref 10–40)
ANION GAP SERPL CALC-SCNC: 17 MMOL/L (ref 8–19)
AST SERPL-CCNC: 44 U/L (ref 15–50)
BASOPHILS # BLD AUTO: 0.05 10*3/UL (ref 0–0.2)
BASOPHILS NFR BLD AUTO: 0.7 % (ref 0–3)
BILIRUB SERPL-MCNC: 0.17 MG/DL (ref 0.2–1.3)
BUN SERPL-MCNC: 8 MG/DL (ref 5–25)
BUN/CREAT SERPL: 11 {RATIO} (ref 6–20)
CALCIUM SERPL-MCNC: 9.5 MG/DL (ref 8.7–10.4)
CHLORIDE SERPL-SCNC: 101 MMOL/L (ref 99–111)
CONV ABS IMM GRAN: 0.02 10*3/UL (ref 0–0.2)
CONV CO2: 23 MMOL/L (ref 22–32)
CONV IMMATURE GRAN: 0.3 % (ref 0–1.8)
CONV TOTAL PROTEIN: 7.6 G/DL (ref 6.3–8.2)
CREAT UR-MCNC: 0.76 MG/DL (ref 0.5–0.9)
DEPRECATED RDW RBC AUTO: 44.1 FL (ref 36.4–46.3)
EOSINOPHIL # BLD AUTO: 0.04 10*3/UL (ref 0–0.7)
EOSINOPHIL # BLD AUTO: 0.5 % (ref 0–7)
ERYTHROCYTE [DISTWIDTH] IN BLOOD BY AUTOMATED COUNT: 12.1 % (ref 11.7–14.4)
GFR SERPLBLD BASED ON 1.73 SQ M-ARVRAT: >60 ML/MIN/{1.73_M2}
GLOBULIN UR ELPH-MCNC: 3.4 G/DL (ref 2–3.5)
GLUCOSE SERPL-MCNC: 91 MG/DL (ref 65–99)
HCT VFR BLD AUTO: 40.9 % (ref 37–47)
HGB BLD-MCNC: 13.3 G/DL (ref 12–16)
LYMPHOCYTES # BLD AUTO: 1.9 10*3/UL (ref 1–5)
LYMPHOCYTES NFR BLD AUTO: 24.7 % (ref 20–45)
MCH RBC QN AUTO: 31.8 PG (ref 27–31)
MCHC RBC AUTO-ENTMCNC: 32.5 G/DL (ref 33–37)
MCV RBC AUTO: 97.8 FL (ref 81–99)
MONOCYTES # BLD AUTO: 0.63 10*3/UL (ref 0.2–1.2)
MONOCYTES NFR BLD AUTO: 8.2 % (ref 3–10)
NEUTROPHILS # BLD AUTO: 5.05 10*3/UL (ref 2–8)
NEUTROPHILS NFR BLD AUTO: 65.6 % (ref 30–85)
NRBC CBCN: 0 % (ref 0–0.7)
OSMOLALITY SERPL CALC.SUM OF ELEC: 282 MOSM/KG (ref 273–304)
PLATELET # BLD AUTO: 249 10*3/UL (ref 130–400)
PMV BLD AUTO: 10.4 FL (ref 9.4–12.3)
POTASSIUM SERPL-SCNC: 4.1 MMOL/L (ref 3.5–5.3)
RBC # BLD AUTO: 4.18 10*6/UL (ref 4.2–5.4)
SODIUM SERPL-SCNC: 137 MMOL/L (ref 135–147)
WBC # BLD AUTO: 7.69 10*3/UL (ref 4.8–10.8)

## 2019-11-22 LAB
C DIFF TOX B STL QL CT TISS CULT: NEGATIVE
CONV 027 TOXIN: NEGATIVE

## 2019-11-23 LAB — BACTERIA SPEC AEROBE CULT: NORMAL

## 2020-02-21 ENCOUNTER — HOSPITAL ENCOUNTER (OUTPATIENT)
Dept: GASTROENTEROLOGY | Facility: HOSPITAL | Age: 50
Setting detail: HOSPITAL OUTPATIENT SURGERY
Discharge: HOME | End: 2020-02-21
Attending: INTERNAL MEDICINE

## 2020-02-21 LAB — HCG UR QL: NEGATIVE

## 2020-03-10 ENCOUNTER — OFFICE VISIT CONVERTED (OUTPATIENT)
Dept: NEUROLOGY | Facility: CLINIC | Age: 50
End: 2020-03-10
Attending: NURSE PRACTITIONER

## 2020-03-11 ENCOUNTER — HOSPITAL ENCOUNTER (OUTPATIENT)
Dept: OTHER | Facility: HOSPITAL | Age: 50
Discharge: HOME | End: 2020-03-11

## 2020-03-11 LAB
ALBUMIN SERPL-MCNC: 4.1 G/DL (ref 3.5–5)
ALBUMIN/GLOB SERPL: 1.3 {RATIO} (ref 1.4–2.6)
ALP SERPL-CCNC: 51 U/L (ref 53–141)
ALT SERPL-CCNC: 15 U/L (ref 10–40)
ANION GAP SERPL CALC-SCNC: 15 MMOL/L (ref 8–19)
AST SERPL-CCNC: 43 U/L (ref 15–50)
BASOPHILS # BLD AUTO: 0.08 10*3/UL (ref 0–0.2)
BASOPHILS NFR BLD AUTO: 1.1 % (ref 0–3)
BILIRUB SERPL-MCNC: 0.24 MG/DL (ref 0.2–1.3)
BUN SERPL-MCNC: 14 MG/DL (ref 5–25)
BUN/CREAT SERPL: 19 {RATIO} (ref 6–20)
CALCIUM SERPL-MCNC: 9.2 MG/DL (ref 8.7–10.4)
CHLORIDE SERPL-SCNC: 104 MMOL/L (ref 99–111)
CHOLEST SERPL-MCNC: 194 MG/DL (ref 107–200)
CHOLEST/HDLC SERPL: 2.7 {RATIO} (ref 3–6)
CONV ABS IMM GRAN: 0.02 10*3/UL (ref 0–0.2)
CONV CO2: 25 MMOL/L (ref 22–32)
CONV IMMATURE GRAN: 0.3 % (ref 0–1.8)
CONV TOTAL PROTEIN: 7.3 G/DL (ref 6.3–8.2)
CREAT UR-MCNC: 0.74 MG/DL (ref 0.5–0.9)
DEPRECATED RDW RBC AUTO: 44.9 FL (ref 36.4–46.3)
EOSINOPHIL # BLD AUTO: 0.14 10*3/UL (ref 0–0.7)
EOSINOPHIL # BLD AUTO: 1.9 % (ref 0–7)
ERYTHROCYTE [DISTWIDTH] IN BLOOD BY AUTOMATED COUNT: 12.3 % (ref 11.7–14.4)
EST. AVERAGE GLUCOSE BLD GHB EST-MCNC: 114 MG/DL
GFR SERPLBLD BASED ON 1.73 SQ M-ARVRAT: >60 ML/MIN/{1.73_M2}
GLOBULIN UR ELPH-MCNC: 3.2 G/DL (ref 2–3.5)
GLUCOSE SERPL-MCNC: 111 MG/DL (ref 65–99)
HBA1C MFR BLD: 5.6 % (ref 3.5–5.7)
HCT VFR BLD AUTO: 43.1 % (ref 37–47)
HDLC SERPL-MCNC: 72 MG/DL (ref 40–60)
HGB BLD-MCNC: 14 G/DL (ref 12–16)
LDLC SERPL CALC-MCNC: 109 MG/DL (ref 70–100)
LYMPHOCYTES # BLD AUTO: 3.36 10*3/UL (ref 1–5)
LYMPHOCYTES NFR BLD AUTO: 45.8 % (ref 20–45)
MCH RBC QN AUTO: 32 PG (ref 27–31)
MCHC RBC AUTO-ENTMCNC: 32.5 G/DL (ref 33–37)
MCV RBC AUTO: 98.4 FL (ref 81–99)
MONOCYTES # BLD AUTO: 0.54 10*3/UL (ref 0.2–1.2)
MONOCYTES NFR BLD AUTO: 7.4 % (ref 3–10)
NEUTROPHILS # BLD AUTO: 3.2 10*3/UL (ref 2–8)
NEUTROPHILS NFR BLD AUTO: 43.5 % (ref 30–85)
NRBC CBCN: 0 % (ref 0–0.7)
OSMOLALITY SERPL CALC.SUM OF ELEC: 291 MOSM/KG (ref 273–304)
PLATELET # BLD AUTO: 276 10*3/UL (ref 130–400)
PMV BLD AUTO: 10.9 FL (ref 9.4–12.3)
POTASSIUM SERPL-SCNC: 4.1 MMOL/L (ref 3.5–5.3)
RBC # BLD AUTO: 4.38 10*6/UL (ref 4.2–5.4)
SODIUM SERPL-SCNC: 140 MMOL/L (ref 135–147)
TRIGL SERPL-MCNC: 63 MG/DL (ref 40–150)
TSH SERPL-ACNC: 3.05 M[IU]/L (ref 0.27–4.2)
VLDLC SERPL-MCNC: 13 MG/DL (ref 5–37)
WBC # BLD AUTO: 7.34 10*3/UL (ref 4.8–10.8)

## 2020-03-12 ENCOUNTER — HOSPITAL ENCOUNTER (OUTPATIENT)
Dept: MRI IMAGING | Facility: HOSPITAL | Age: 50
Discharge: HOME | End: 2020-03-12
Attending: NURSE PRACTITIONER

## 2020-03-16 ENCOUNTER — OFFICE VISIT CONVERTED (OUTPATIENT)
Dept: NEUROLOGY | Facility: CLINIC | Age: 50
End: 2020-03-16
Attending: NURSE PRACTITIONER

## 2020-03-16 ENCOUNTER — CONVERSION ENCOUNTER (OUTPATIENT)
Dept: NEUROLOGY | Facility: CLINIC | Age: 50
End: 2020-03-16

## 2020-04-16 ENCOUNTER — TELEMEDICINE CONVERTED (OUTPATIENT)
Dept: NEUROSURGERY | Facility: CLINIC | Age: 50
End: 2020-04-16
Attending: NEUROLOGICAL SURGERY

## 2020-05-12 ENCOUNTER — HOSPITAL ENCOUNTER (OUTPATIENT)
Dept: PHYSICAL THERAPY | Facility: CLINIC | Age: 50
Setting detail: RECURRING SERIES
Discharge: HOME | End: 2020-06-26
Attending: NEUROLOGICAL SURGERY

## 2020-08-17 ENCOUNTER — OFFICE VISIT CONVERTED (OUTPATIENT)
Dept: NEUROLOGY | Facility: CLINIC | Age: 50
End: 2020-08-17
Attending: NURSE PRACTITIONER

## 2020-09-16 ENCOUNTER — CONVERSION ENCOUNTER (OUTPATIENT)
Dept: FAMILY MEDICINE CLINIC | Facility: CLINIC | Age: 50
End: 2020-09-16

## 2020-09-16 ENCOUNTER — OFFICE VISIT CONVERTED (OUTPATIENT)
Dept: FAMILY MEDICINE CLINIC | Facility: CLINIC | Age: 50
End: 2020-09-16
Attending: NURSE PRACTITIONER

## 2020-10-29 ENCOUNTER — HOSPITAL ENCOUNTER (OUTPATIENT)
Dept: GENERAL RADIOLOGY | Facility: HOSPITAL | Age: 50
Discharge: HOME | End: 2020-10-29
Attending: NURSE PRACTITIONER

## 2020-10-30 ENCOUNTER — HOSPITAL ENCOUNTER (OUTPATIENT)
Dept: GENERAL RADIOLOGY | Facility: HOSPITAL | Age: 50
Discharge: HOME | End: 2020-10-30
Attending: NURSE PRACTITIONER

## 2020-11-10 ENCOUNTER — OFFICE VISIT CONVERTED (OUTPATIENT)
Dept: ORTHOPEDIC SURGERY | Facility: CLINIC | Age: 50
End: 2020-11-10
Attending: ORTHOPAEDIC SURGERY

## 2020-11-13 ENCOUNTER — HOSPITAL ENCOUNTER (OUTPATIENT)
Dept: PHYSICAL THERAPY | Facility: CLINIC | Age: 50
Setting detail: RECURRING SERIES
Discharge: HOME | End: 2020-12-11
Attending: ORTHOPAEDIC SURGERY

## 2020-12-28 ENCOUNTER — HOSPITAL ENCOUNTER (OUTPATIENT)
Dept: OTHER | Facility: HOSPITAL | Age: 50
Discharge: HOME | End: 2020-12-28
Attending: INTERNAL MEDICINE

## 2020-12-31 ENCOUNTER — HOSPITAL ENCOUNTER (OUTPATIENT)
Dept: GENERAL RADIOLOGY | Facility: HOSPITAL | Age: 50
Discharge: HOME | End: 2020-12-31
Attending: NURSE PRACTITIONER

## 2021-01-22 ENCOUNTER — HOSPITAL ENCOUNTER (OUTPATIENT)
Dept: OTHER | Facility: HOSPITAL | Age: 51
Discharge: HOME | End: 2021-01-22
Attending: INTERNAL MEDICINE

## 2021-04-02 ENCOUNTER — OFFICE VISIT CONVERTED (OUTPATIENT)
Dept: FAMILY MEDICINE CLINIC | Facility: CLINIC | Age: 51
End: 2021-04-02
Attending: NURSE PRACTITIONER

## 2021-04-02 ENCOUNTER — CONVERSION ENCOUNTER (OUTPATIENT)
Dept: FAMILY MEDICINE CLINIC | Facility: CLINIC | Age: 51
End: 2021-04-02

## 2021-04-07 ENCOUNTER — HOSPITAL ENCOUNTER (OUTPATIENT)
Dept: OTHER | Facility: HOSPITAL | Age: 51
Discharge: HOME | End: 2021-04-07
Attending: NURSE PRACTITIONER

## 2021-04-07 ENCOUNTER — HOSPITAL ENCOUNTER (OUTPATIENT)
Dept: OTHER | Facility: HOSPITAL | Age: 51
Discharge: HOME | End: 2021-04-07

## 2021-04-07 LAB
25(OH)D3 SERPL-MCNC: 37.3 NG/ML (ref 30–100)
ALBUMIN SERPL-MCNC: 4.1 G/DL (ref 3.5–5)
ALBUMIN/GLOB SERPL: 1.2 {RATIO} (ref 1.4–2.6)
ALP SERPL-CCNC: 39 U/L (ref 53–141)
ALT SERPL-CCNC: 13 U/L (ref 10–40)
ANION GAP SERPL CALC-SCNC: 13 MMOL/L (ref 8–19)
AST SERPL-CCNC: 37 U/L (ref 15–50)
BASOPHILS # BLD AUTO: 0.09 10*3/UL (ref 0–0.2)
BASOPHILS NFR BLD AUTO: 1.5 % (ref 0–3)
BILIRUB SERPL-MCNC: 0.42 MG/DL (ref 0.2–1.3)
BUN SERPL-MCNC: 15 MG/DL (ref 5–25)
BUN/CREAT SERPL: 18 {RATIO} (ref 6–20)
CALCIUM SERPL-MCNC: 9.4 MG/DL (ref 8.7–10.4)
CHLORIDE SERPL-SCNC: 105 MMOL/L (ref 99–111)
CHOLEST SERPL-MCNC: 181 MG/DL (ref 107–200)
CHOLEST/HDLC SERPL: 2.6 {RATIO} (ref 3–6)
CONV ABS IMM GRAN: 0.02 10*3/UL (ref 0–0.2)
CONV CO2: 26 MMOL/L (ref 22–32)
CONV IMMATURE GRAN: 0.3 % (ref 0–1.8)
CONV TOTAL PROTEIN: 7.5 G/DL (ref 6.3–8.2)
CREAT UR-MCNC: 0.82 MG/DL (ref 0.5–0.9)
DEPRECATED RDW RBC AUTO: 44.2 FL (ref 36.4–46.3)
EOSINOPHIL # BLD AUTO: 0.12 10*3/UL (ref 0–0.7)
EOSINOPHIL # BLD AUTO: 1.9 % (ref 0–7)
ERYTHROCYTE [DISTWIDTH] IN BLOOD BY AUTOMATED COUNT: 12.4 % (ref 11.7–14.4)
GFR SERPLBLD BASED ON 1.73 SQ M-ARVRAT: >60 ML/MIN/{1.73_M2}
GLOBULIN UR ELPH-MCNC: 3.4 G/DL (ref 2–3.5)
GLUCOSE SERPL-MCNC: 96 MG/DL (ref 65–99)
HCT VFR BLD AUTO: 40.8 % (ref 37–47)
HDLC SERPL-MCNC: 69 MG/DL (ref 40–60)
HGB BLD-MCNC: 13.4 G/DL (ref 12–16)
LDLC SERPL CALC-MCNC: 102 MG/DL (ref 70–100)
LYMPHOCYTES # BLD AUTO: 2.3 10*3/UL (ref 1–5)
LYMPHOCYTES NFR BLD AUTO: 37.1 % (ref 20–45)
MCH RBC QN AUTO: 31.9 PG (ref 27–31)
MCHC RBC AUTO-ENTMCNC: 32.8 G/DL (ref 33–37)
MCV RBC AUTO: 97.1 FL (ref 81–99)
MONOCYTES # BLD AUTO: 0.47 10*3/UL (ref 0.2–1.2)
MONOCYTES NFR BLD AUTO: 7.6 % (ref 3–10)
NEUTROPHILS # BLD AUTO: 3.2 10*3/UL (ref 2–8)
NEUTROPHILS NFR BLD AUTO: 51.6 % (ref 30–85)
NRBC CBCN: 0 % (ref 0–0.7)
OSMOLALITY SERPL CALC.SUM OF ELEC: 289 MOSM/KG (ref 273–304)
PLATELET # BLD AUTO: 283 10*3/UL (ref 130–400)
PMV BLD AUTO: 10.8 FL (ref 9.4–12.3)
POTASSIUM SERPL-SCNC: 4.8 MMOL/L (ref 3.5–5.3)
RBC # BLD AUTO: 4.2 10*6/UL (ref 4.2–5.4)
SODIUM SERPL-SCNC: 139 MMOL/L (ref 135–147)
TRIGL SERPL-MCNC: 49 MG/DL (ref 40–150)
TSH SERPL-ACNC: 2.29 M[IU]/L (ref 0.27–4.2)
VIT B12 SERPL-MCNC: 1195 PG/ML (ref 211–911)
VLDLC SERPL-MCNC: 10 MG/DL (ref 5–37)
WBC # BLD AUTO: 6.2 10*3/UL (ref 4.8–10.8)

## 2021-04-19 ENCOUNTER — OFFICE VISIT CONVERTED (OUTPATIENT)
Dept: FAMILY MEDICINE CLINIC | Facility: CLINIC | Age: 51
End: 2021-04-19
Attending: PHYSICIAN ASSISTANT

## 2021-04-19 ENCOUNTER — HOSPITAL ENCOUNTER (OUTPATIENT)
Dept: GENERAL RADIOLOGY | Facility: HOSPITAL | Age: 51
Discharge: HOME | End: 2021-04-19
Attending: PHYSICIAN ASSISTANT

## 2021-04-19 ENCOUNTER — HOSPITAL ENCOUNTER (OUTPATIENT)
Dept: LAB | Facility: HOSPITAL | Age: 51
Discharge: HOME | End: 2021-04-19
Attending: PHYSICIAN ASSISTANT

## 2021-04-19 LAB
BASOPHILS # BLD AUTO: 0.08 10*3/UL (ref 0–0.2)
BASOPHILS NFR BLD AUTO: 1.2 % (ref 0–3)
CONV ABS IMM GRAN: 0.01 10*3/UL (ref 0–0.2)
CONV IMMATURE GRAN: 0.2 % (ref 0–1.8)
CRP SERPL-MCNC: <3 MG/L (ref 0–5)
DEPRECATED RDW RBC AUTO: 44.7 FL (ref 36.4–46.3)
EOSINOPHIL # BLD AUTO: 0.05 10*3/UL (ref 0–0.7)
EOSINOPHIL # BLD AUTO: 0.8 % (ref 0–7)
ERYTHROCYTE [DISTWIDTH] IN BLOOD BY AUTOMATED COUNT: 12.4 % (ref 11.7–14.4)
ERYTHROCYTE [SEDIMENTATION RATE] IN BLOOD: 5 MM/H (ref 0–30)
HCT VFR BLD AUTO: 39.5 % (ref 37–47)
HGB BLD-MCNC: 12.9 G/DL (ref 12–16)
LYMPHOCYTES # BLD AUTO: 3.26 10*3/UL (ref 1–5)
LYMPHOCYTES NFR BLD AUTO: 49 % (ref 20–45)
MCH RBC QN AUTO: 31.9 PG (ref 27–31)
MCHC RBC AUTO-ENTMCNC: 32.7 G/DL (ref 33–37)
MCV RBC AUTO: 97.5 FL (ref 81–99)
MONOCYTES # BLD AUTO: 0.5 10*3/UL (ref 0.2–1.2)
MONOCYTES NFR BLD AUTO: 7.5 % (ref 3–10)
NEUTROPHILS # BLD AUTO: 2.75 10*3/UL (ref 2–8)
NEUTROPHILS NFR BLD AUTO: 41.3 % (ref 30–85)
NRBC CBCN: 0 % (ref 0–0.7)
PLATELET # BLD AUTO: 279 10*3/UL (ref 130–400)
PMV BLD AUTO: 11.2 FL (ref 9.4–12.3)
RBC # BLD AUTO: 4.05 10*6/UL (ref 4.2–5.4)
WBC # BLD AUTO: 6.65 10*3/UL (ref 4.8–10.8)

## 2021-04-20 LAB — URATE SERPL-MCNC: 4.5 MG/DL (ref 2.5–7.5)

## 2021-05-10 NOTE — H&P
History and Physical      Patient Name: Mercy Dalton   Patient ID: [id number]   Sex: Female   YOB: 1970    Primary Care Provider: Adela RUIZ   Referring Provider: Adela RUIZ    Visit Date: November 10, 2020    Provider: Tacho Salas MD   Location: [facility]   Location Address: [address] [place] [address]  [id number]   Location Phone: [phone]          Chief Complaint  · Right Shoulder Pain      History Of Present Illness  Mercy Dalton is a 50 year old /White female who presents today to [facility].      Patient presents today for an evaluation of right shoulder pain. She presents today with MRI and X-ray results of her right shoulder. She states that a couple of months ago, when she was sleeping on her right side, she noticed that it was very painful to her right shoulder. She states she took Ibuprofen and pain improved. She was out in the yard and she had an onset of right shoulder pain again that went away as well. Recently the shoulder flared up again and has been causing her pain.       Past Medical History  Allergic rhinitis; Anemia; Exercise induced Asthma; Muscle cramp; Pap smear for cervical cancer screening; RLS (restless legs syndrome); Screening Mammogram         Past Surgical History  Ablation; Colonoscopy; Ectopic pregnancy removal; Laparoscopy         Medication List  Calcium 500 500 mg calcium (1,250 mg) oral tablet,chewable; diclofenac sodium 75 mg oral tablet,delayed release (DR/EC); EpiPen 0.3 mg/0.3 mL injection auto-injector; levothyroxine 25 mcg oral tablet; mupirocin 2 % topical ointment; triamcinolone acetonide 0.1 % topical cream; vitamin B complex oral tablet; Vitamin C 1,000 mg oral tablet         Allergy List  bacitracin; Codeine Phosphate; Codeine Sulfate; flu vaccine ts 2011-12(18 yr+); formaldehyde; Davsi Rufus; hydrocortisone; Latex Exam Gloves; neomycin; Polymyxin; prednisone; Septra; Tixocortol pivalate (topical  "corticosteroid)       Allergies Reconciled  Family Medical History  Family history of skin cancer; No family history of colorectal cancer; Family history of stroke; Family history of diabetes mellitus; Family history of liver disease         Reproductive History   3 Para 0 0 0 0       Social History  Active but no formal exercise; Alcohol (Current some day); Denies substance abuse (Never); ; No known infection risk; Tobacco (Never)         Immunizations  Name Date Admin   Influenza Contraindicated   Influenza 2013         Review of Systems  · Constitutional  o Denies  o : fever, chills, weight loss  · Cardiovascular  o Denies  o : chest pain, shortness of breath  · Gastrointestinal  o Denies  o : liver disease, heartburn, nausea, blood in stools  · Genitourinary  o Denies  o : painful urination, blood in urine  · Integument  o Denies  o : rash, itching  · Neurologic  o Denies  o : headache, weakness, loss of consciousness  · Musculoskeletal  o Denies  o : painful, swollen joints  · Psychiatric  o Denies  o : drug/alcohol addiction, anxiety, depression      Vitals  Date Time BP Position Site L\R Cuff Size HR RR TEMP (F) WT  HT  BMI kg/m2 BSA m2 O2 Sat FR L/min FiO2 HC       11/10/2020 09:43 AM      66 - R   160lbs 0oz 5'  8\" 24.33 1.87 99 %            Physical Examination  · Constitutional  o Appearance  o : well developed, well-nourished, no obvious deformities present  · Head and Face  o Head  o :   § Inspection  § : normocephalic  o Face  o :   § Inspection  § : no facial lesions  · Eyes  o Conjunctivae  o : conjunctivae normal  o Sclerae  o : sclerae white  · Ears, Nose, Mouth and Throat  o Ears  o :   § External Ears  § : appearance within normal limits  § Hearing  § : intact  o Nose  o :   § External Nose  § : appearance normal  · Neck  o Inspection/Palpation  o : normal appearance  o Range of Motion  o : full range of motion  · Respiratory  o Respiratory Effort  o : breathing " unlabored  o Inspection of Chest  o : normal appearance  o Auscultation of Lungs  o : no audible wheezing or rales  · Cardiovascular  o Heart  o : regular rate  · Gastrointestinal  o Abdominal Examination  o : soft and non-tender  · Skin and Subcutaneous Tissue  o General Inspection  o : intact, no rashes  · Psychiatric  o General  o : Alert and oriented x3  o Judgement and Insight  o : judgment and insight intact  o Mood and Affect  o : mood normal, affect appropriate  · Right Shoulder  o Inspection  o : Sensation grossly intact. Neurovascular intact. Skin intact. Abduction with pain. Full forward flexion. IR to L2. No swelling, skin discoloration or atrophy. Pain with Neer's and Lowry. No pain with drop arm testing. No pain with empty can testing. Good tone of deltoid, biceps, triceps, wrist extensors, and wrist flexors. Radial pulse 2+, ulnar pulse 2+. No tenderness at sternoclavicular joint, no tenderness at clavicle, non-tender of acromioclavicular joint, no tenderness at greater tuberosity, no tenderness at subacromial bursae, no tenderness at bicipital groove.   · Imaging  o Imaging  o : 10/30/20 MRI: 1. Mild to moderate supraspinatus and anterior infraspinatus tendinopathy with mild partial-thickness bursal sided fraying anteriorly at the footplate. Calcifications on the radiographs suggest calcific tendinopathy. 2. Mild subacromial/subdeltoid bursitis. 3. Thickening of the glenohumeral joint capsule, suggesting capsulitis.           Assessment  · Right shoulder pain, unspecified chronicity     719.41/M25.511  · Shoulder impingement syndrome, right     726.2/M75.41      Plan  · Medications  o Medications have been Reconciled  o Transition of Care or Provider Policy  · Instructions  o Dr. Salas saw and examined the patient and agrees with plan.   o MRI reviewed by Dr. Salas.  o Reviewed the patient's Past Medical, Social, and Family history as well as the ROS at today's visit, no changes.  o Call or return  if worsening symptoms.  o Exercise handout given.  o Follow Up PRN.  o This note was transcribed by Aparna Keene. anat  o Discussed diagnosis and treatment options with the patient. Patient wishes to proceed with taking NSAIDs and stretches.            Electronically Signed by: Aparna Keene-, Other -Author on November 12, 2020 10:21:26 AM  Electronically Co-signed by: Tacho Salas MD -Reviewer on November 14, 2020 10:08:42 AM

## 2021-05-10 NOTE — H&P
History and Physical      Patient Name: Mercy Dalton   Patient ID: [id number]   Sex: Female   YOB: 1970    Primary Care Provider: Nithya RUIZ   Referring Provider: Nithya RUIZ    Visit Date: April 16, 2020    Provider: David Druan MD   Location: [facility]   Location Address: [address] [place] [address]  [id number]   Location Phone: [phone]          Chief Complaint  · Back pain     Patient is having Zoom appointment today to discuss her low back pain. MRI @ [facility] and report has been scanned.       History Of Present Illness  Video Conferencing Visit  Mercy Dalton is a 50 year old /White female who is presenting for evaluation via video conferencing. Verbal consent obtained before beginning visit.   The following staff were present during this visit: Vicki Tucker MA , Dr. David Duran   The patient is a 50 year old /White female, who presents on referral from [facility], for a neurosurgical evaluation of low back pain and bilateral leg pain.   The pain developed gradually several years ago. It is 4/10 in severity has an aching and a dull quality and radiates into the bilateral lower leg in a nonspecific distribution. The pain has been waxing and waning in severity. The pain tends to be maximal at no specific time, but waxes and wanes in severity throughout the day. The patient states the pain is aggravated by prolonged standing and outdoor work. It is alleviated by NSAIDS and gabapentin.   She also reports some cramping into the legs. She denies weakness. The patient has no prior history of neck or back surgery.   RECENT INTERVENTIONS:  She has been previously treated with physical therapy and NSAIDs. The physical therapy was partially effective years ago in relieving the pain.   INFORMATION REVIEWED:  The following information was reviewed: radiology reports and images. MRI of the lumbar spine revealed spinal stenosis. The  stenosis is present at L4-5 and L3-4.       Past Medical History  Allergic rhinitis; Anemia; Exercise induced Asthma; Muscle cramp; RLS (restless legs syndrome)         Past Surgical History  Ablation; Colonoscopy; Ectopic pregnancy removal; Laparoscopy         Medication List  Calcium 500 500 mg calcium (1,250 mg) oral tablet,chewable; EpiPen 0.3 mg/0.3 mL injection auto-injector; gabapentin 100 mg oral capsule; gabapentin 300 mg oral capsule; levothyroxine 25 mcg oral tablet; mupirocin 2 % topical ointment; ropinirole 1 mg oral tablet; triamcinolone acetonide 0.1 % topical cream; vitamin B complex oral tablet; Vitamin C 1,000 mg oral tablet         Allergy List  bacitracin; Codeine Phosphate; Codeine Sulfate; flu vaccine ts -(18 yr+); formaldehyde; Davis Rufus; hydrocortisone; Latex Exam Gloves; neomycin; Polymyxin; prednisone; Septra; Tixocortol pivalate (topical corticosteroid)         Family Medical History  Family history of skin cancer; No family history of colorectal cancer; Family history of stroke; Family history of diabetes mellitus; Family history of liver disease         Reproductive History   3 Para 0 0 0 0       Social History  Active but no formal exercise; Alcohol (Current some day); Denies substance abuse (Never); ; No known infection risk; Tobacco (Never)         Immunizations  Name Date Admin   Influenza Contraindicated   Influenza          Review of Systems  · Constitutional  o Denies  o : chills, excessive sweating, fatigue, fever, sycope/passing out, weight gain, weight loss  · Eyes  o Denies  o : changes in vision, blurry vision, double vision  · HENT  o Denies  o : loss of hearing, ringing in the ears, ear aches, sore throat, nasal congestion, sinus pain, nose bleeds, seasonal allergies  · Cardiovascular  o Denies  o : blood clots, swollen legs, anemia, easy burising or bleeding, transfusions  · Respiratory  o Denies  o : shortness of breath, dry cough, productive cough,  pneumonia, COPD  · Gastrointestinal  o Denies  o : difficulty swallowing, reflux  · Genitourinary  o Denies  o : incontinence  · Neurologic  o Denies  o : headache, seizure, stroke, tremor, loss of balance, falls, dizziness/vertigo, difficulty with sleep, numbness/tingling/paresthesia , difficulty with coordination, difficulty with dexterity, weakness  · Musculoskeletal  o Admits  o : spasms, pain radiating in leg, low back pain  o Denies  o : neck stiffness/pain, swollen lymph nodes, muscle aches, joint pain, weakness, sciatica, pain radiating in arm  · Endocrine  o Denies  o : diabetes, thyroid disorder  · Psychiatric  o Denies  o : anxiety, depression      Physical Examination  · Constitutional  o Appearance  o : well-nourished, well developed, alert, in no acute distress     Zoom video visit. No PE performed.               Assessment  · Lumbar Spinal Stenosis     724.02/M48.06  L3-4 and L4-5 moderate to severe in nature    Problems Reconciled  Plan  · Orders  o PHYSICAL THERAPY CONSULTATION (PHYTH) - - 04/16/2020   Lower back pain and leg pain. Spinal stenosis. Evaluate and treat.  · Medications  o Medications have been Reconciled  o Transition of Care or Provider Policy  · Instructions  o I have discussed the risks and benefits of surgery versus physical therapy, epidural steroids, and other conservative forms of treatment.  o Handouts provided: Non-Surgical Treatments for Back Pain/Degenerative Disc Disease.  o We have discussed the benefits of core strengthening. Patient will work on improving the core muscle strength with low impact activities such as walking, swimming, Pilates, etc.   o Call or return to office if symptoms worsen or persist.  o Her most promising options are P.T. or surgery (right approach for L3-4 and L4-5 minimally invasive laminectomy).   o She would like to try P.T. first and if not helpful consider surgery.             Electronically Signed by: David Duran MD -Author on April 16,  2020 03:54:49 PM

## 2021-05-13 NOTE — PROGRESS NOTES
Progress Note      Patient Name: Mercy Dalton   Patient ID: [id number]   Sex: Female   YOB: 1970    Primary Care Provider: Adela RUIZ   Referring Provider: Adela RUIZ    Visit Date: September 16, 2020    Provider: JOSEPH Palencia   Location: [facility]   Location Address: [address] [place] [address], [address]  [place], [place]  [id number]   Location Phone: [phone]          Chief Complaint  · ANNUAL CHECK UP      History Of Present Illness  Mercy Dalton is a 50 year old /White female who presents for evaluation and treatment of:      Patient is here today for an annual follow up-she need her physical form filled out for work.  Need refills on Mupirocin.     Reports R shoulder pain since last week-admits it has been waking her up at night.  She has tried a TENS unit but it minimally helped. States its painful when laying on the right shoulder or when lifting it feels like it catches. No imaging has been done. She has taken ibuprofen which seems to help. Hx of tendonitis right elbow-she has went to PT-uses TENS unit.    she has multiple allergies-she has had skin testing twice due to the multiple allergies.     colonoscopy 2/21/20-WNL.     Mammogram-7/19-willing to [facility].     last PAP 2019-she gets them done in [facility]. WNL.     reviewed [facility] labs.             Past Medical History  Disease Name Date Onset Notes   Allergic rhinitis --  --    Anemia --  --    Exercise induced Asthma --  --    Muscle cramp --  --    Pap smear for cervical cancer screening --  --    RLS (restless legs syndrome) 07/12/2018 Nature reports a long history of cramping in her feet. Approximate 3-4 years ago, she was started on Requip 1mg daily. She did well for approximately 1 year. At that time, her symptoms return. Her Requip was titrated to 1.5 mg daily. She had been doing well until approximately 3-4 months ago. At that time, she noted  recurrence of cramping in her legs and feet primarily at night. She does describe a restless feeling in her legs that does improve she is able to get up and walk around. She was recently written for gabapentin. She has not started this yet. I did discuss our treatment options. Ultimately, the decision was made to start a trial of gabapentin 100 mg at night. This could be titrated in the future. Alternatively, her Requip could be switched to a different DA.    Screening Mammogram 07/2019 NL         Past Surgical History  Procedure Name Date Notes   Ablation 2012 Abnormal periods/   Colonoscopy 02/21/20 POLYPS, INTERNAL HEMORRHOIDS   Ectopic pregnancy removal 1997, 1999 --    Laparoscopy 1997/1999 Ectopic pregnancy         Medication List  Name Date Started Instructions   Calcium 500 500 mg calcium (1,250 mg) oral tablet,chewable  chew 1 tablet by oral route daily   EpiPen 0.3 mg/0.3 mL injection auto-injector 04/06/2016 inject 0.3 milliliter (0.3 mg) by intramuscular route once as needed for anaphylaxis for 1 day   levothyroxine 25 mcg oral tablet 09/16/2020 TAKE ONE TABLET BY MOUTH DAILY for 90 days   mupirocin 2 % topical ointment 09/16/2020 apply a small amount to the affected area by topical route 3 times per day   triamcinolone acetonide 0.1 % topical cream 07/24/2019 APPLY A THIN LAYER TO AFFECTED AREA(S) THREE TIMES A DAY FOR 2 WEEKS   vitamin B complex oral tablet  take 1 tablet by oral route daily   Vitamin C 1,000 mg oral tablet 03/15/2017 take 1 tablet by oral route daily         Allergy List  Allergen Name Date Reaction Notes   bacitracin --  --  --    Codeine Phosphate --  --  --    Codeine Sulfate --  --  --    flu vaccine ts 2011-12(18 yr+) 11/2013 itchy bumps all over per pt --    formaldehyde --  --  --    Davis Rufus --  --  --    hydrocortisone --  --  --    Latex Exam Gloves --  --  --    neomycin --  --  --    Polymyxin --  --  --    prednisone --  --  --    Septra --  --  --     Tixocortol pivalate (topical corticosteroid) --  --  --        Allergies Reconciled  Family Medical History  Disease Name Relative/Age Notes   Family history of skin cancer Aunt/  Grandmother (maternal)/   Aunt - Basal Cell Grandmother - Unknown skin cancer   No family history of colorectal cancer  --    Family history of stroke  --    Family history of diabetes mellitus Father/   --    Family history of liver disease Mother/   --          Reproductive History  Menstrual   Age Menarche: 10   Pregnancy Summary   Total Pregnancies: 3 Full Term: 0 Premature: 0   Ab Induced: 0 Ab Spontaneous: 0 Ectopics: 0   Multiples: 0 Livin         Social History  Finding Status Start/Stop Quantity Notes   Active but no formal exercise --  --/-- --  --    Alcohol Current some day --/-- --  2020 - 2020 - 10/15/2018 - 4 oz/wk   Denies substance abuse Never --/-- --  10/15/2018 -     --  --/-- --  --    No known infection risk --  --/-- --  --    Tobacco Never --/-- --  2018 - never 2017 - never          Immunizations  NameDate Admin Mfg Trade Name Lot Number Route Inj VIS Given VIS Publication   InfluenzaContraindicated 2019 NE Not Entered  NE NE     Comments:    Mdxvnyzuf78/01/2013 NE Not Entered [id number] IM NE 2014   Comments: per pt         Review of Systems  · Constitutional  o Denies  o : fatigue, night sweats  · Eyes  o Denies  o : double vision, blurred vision  · HENT  o Denies  o : vertigo, recent head injury  · Cardiovascular  o Denies  o : chest pain, irregular heart beats  · Respiratory  o Denies  o : shortness of breath, productive cough  · Gastrointestinal  o Denies  o : nausea, vomiting  · Genitourinary  o Denies  o : dysuria, urinary retention  · Integument  o Denies  o : hair growth change, new skin lesions  · Neurologic  o Denies  o : altered mental status, seizures  · Musculoskeletal  o Admits  o : limitation of motion, shoulder pain  o Denies  o : joint  swelling  · Endocrine  o Denies  o : cold intolerance, heat intolerance  · Heme-Lymph  o Denies  o : petechiae, lymph node enlargement or tenderness  · Allergic-Immunologic  o Denies  o : frequent illnesses      Vitals  Date Time BP Position Site L\R Cuff Size HR RR TEMP (F) WT  HT  BMI kg/m2 BSA m2 O2 Sat HC       09/16/2020 03:00 PM 95/58 Sitting    70 - R   157lbs 2oz    100 %          Physical Examination  · Constitutional  o Appearance  o : alert, in no acute distress, well developed, well-nourished  · Ears, Nose, Mouth and Throat  o Ears  o : Ext. Ears: Normal shape, Non tender, EACs: Normal , TMs: Normal, Hearing: intact to conversational voice bilaterally  o Nose  o : No nasal discharge, Mucosa: normal, Septum: midline, Sinuses: Nontender  o Throat  o : Oropharynx: no inflmation or lesions, Tonsils: within normal limits  · Neck  o Inspection/Palpation  o : Supple, no masses or tenderness, no deformities, Trachea: Midline, ROM: with in normal limits, no neck stiffness, no lymphadenopathy  o Thyroid  o : no thyomegaly, no palpabale masses   · Respiratory  o Auscultation of Lungs  o : normal breath sounds throughout, no wheeze, rhonchi, or crackles  · Cardiovascular  o Heart  o : Regular rate and rhythm, Normal S1,S2 , No cardiac murmers, No S3 or S4 gallop or rubs  · Skin and Subcutaneous Tissue  o General Inspection  o : no rashes, normal skin color, warm and dry  o Digits and Nails  o : no clubbing, cyanosis, deformities or edema present, normal appearing nails  · Neurologic  o Mental Status Examination  o : alert and oriented to time, place, and person. Gait and Station: normal gait, able to stand without difficulty  · Psychiatric  o Judgement and Insight  o : judgment and insight intact  o Mood and Affect  o : normal mood and affect  · Right Shoulder  o Inspection  o : no abnormalities, redness, swelling, scarring or atrophy  o Palpation  o : tender to palpation  o Range of Motion  o : normal range of  motion  o Strength  o : subscapularis, infraspinatus, supraspinatus, and biceps strength all decreased  o Special Tests  o : Neers and Lowry negative, positive drop arm test, Peel Back negative  o Stability  o : shoulder and rotator cuff stability intact              Assessment  · Anemia     285.9/D64.9  · Hypothyroidism     244.9/E03.9  · Screening for depression     V79.0/Z13.89  scored a 1 on the PHQ-9 today-neg for depression.  · Screening for lipid disorders     V77.91/Z13.220  · Visit for screening mammogram     V76.12/Z12.31  ordered today.  · RLS (restless legs syndrome)     333.94/G25.81  · Allergic rhinitis     477.9/J30.9  · Exercise induced Asthma     493.81  · Routine lab draw     V72.60/Z01.89  · Shoulder pain, right     719.41/M25.511  admits right shoulder pain x 1wk. Admits at times it sounds like it is clicking when she lifts the right arm. Denies injury. Admits she hears the clicking when lifting weights. Instructed to hold off lifting wt, apply ice, take NSAIDS, rest. She wishes to hold off imaging at this time. She is to call the office in the next 2-3 wks to report improvement or worsening of symptoms.       Plan  · Orders  o Screening Mammography; Bilateral 3D (46556, , 46301) - V76.12/Z12.31 - 09/16/2020  o ACO-39: Current medications updated and reviewed () - - 09/16/2020  o ACO-14: Influenza immunization was not administered for reasons documented () - - 09/16/2020  o ACO-19: Colorectal cancer screening results documented and reviewed (3017F) - - 02/21/2020  · Medications  o levothyroxine 25 mcg oral tablet   SIG: TAKE ONE TABLET BY MOUTH DAILY for 90 days   DISP: (90) Tablet with 3 refills  Refilled on 09/16/2020     o mupirocin 2 % topical ointment   SIG: apply a small amount to the affected area by topical route 3 times per day   DISP: (1) 15 gm tube with 1 refills  Refilled on 09/16/2020     o Medications have been Reconciled  o Transition of Care or Provider  Policy  · Instructions  o Depression Screen completed and scanned into the EMR under the designated folder within the patient's documents.  o Today's PHQ-9 result is ___1  o Take all medications as prescribed/directed.  o Patient was educated/instructed on their diagnosis, treatment and medications prior to discharge from the clinic today.  o call the office over the next 2-3 wks to report if her shoulder pain has improved or worsened.  · Disposition  o Call or Return if symptoms worsen or persist.            Electronically Signed by: JOSEPH Palencia -Author on September 17, 2020 09:58:54 PM

## 2021-05-13 NOTE — PROGRESS NOTES
Progress Note      Patient Name: Mercy Dalton   Patient ID: [id number]   Sex: Female   YOB: 1970    Primary Care Provider: Adela RUIZ   Referring Provider: Adela RUIZ    Visit Date: August 17, 2020    Provider: JOSEPH Arriaga   Location: [facility]   Location Address: [address] [place] [address]  [id number]   Location Phone: [phone]          Chief Complaint     Follow up       History Of Present Illness  Mercy Dalton is a 49 year old /White female who presents today to [facility] today for a follow up exam for lumbar stenosis and low back pain. Did see Dr. Duran in April. He recommended PT. She had very good results with PT. Has tapered off gabapentin with good result. Is doing very well. Does feel as if she's had some tightness in the hips lately, but attributes this to lack of physical activity lately. Has started to do her stretches at home again.       Past Medical History  Allergic rhinitis; Anemia; Exercise induced Asthma; Muscle cramp; RLS (restless legs syndrome)         Past Surgical History  Ablation; Colonoscopy; Ectopic pregnancy removal; Laparoscopy         Medication List  Calcium 500 500 mg calcium (1,250 mg) oral tablet,chewable; EpiPen 0.3 mg/0.3 mL injection auto-injector; gabapentin 100 mg oral capsule; gabapentin 300 mg oral capsule; levothyroxine 25 mcg oral tablet; mupirocin 2 % topical ointment; ropinirole 1 mg oral tablet; triamcinolone acetonide 0.1 % topical cream; vitamin B complex oral tablet; Vitamin C 1,000 mg oral tablet         Allergy List  bacitracin; Codeine Phosphate; Codeine Sulfate; flu vaccine ts 2011-12(18 yr+); formaldehyde; Davis Rufus; hydrocortisone; Latex Exam Gloves; neomycin; Polymyxin; prednisone; Septra; Tixocortol pivalate (topical corticosteroid)       Allergies Reconciled  Family Medical History  Family history of skin cancer; No family history of colorectal cancer; Family  "history of stroke; Family history of diabetes mellitus; Family history of liver disease         Reproductive History   3 Para 0 0 0 0       Social History  Active but no formal exercise; Alcohol (Current some day); Denies substance abuse (Never); ; No known infection risk; Tobacco (Never)         Immunizations  Name Date Admin   Influenza Contraindicated   Influenza          Review of Systems  · Constitutional  o Denies  o : chills, excessive sweating, fatigue, fever, sycope/passing out, weight gain, weight loss  · Eyes  o Denies  o : changes in vision, blurry vision, double vision  · HENT  o Denies  o : loss of hearing, ringing in the ears, ear aches, sore throat, nasal congestion, sinus pain, nose bleeds, seasonal allergies  · Cardiovascular  o Denies  o : blood clots, swollen legs, anemia, easy burising or bleeding, transfusions  · Respiratory  o Denies  o : shortness of breath, dry cough, productive cough, pneumonia, COPD  · Gastrointestinal  o Denies  o : difficulty swallowing, reflux  · Genitourinary  o Denies  o : incontinence  · Neurologic  o Denies  o : headache, seizure, stroke, tremor, loss of balance, falls, dizziness/vertigo, difficulty with sleep, numbness/tingling/paresthesia , difficulty with coordination, difficulty with dexterity, weakness  · Musculoskeletal  o Denies  o : neck stiffness/pain, swollen lymph nodes, muscle aches, joint pain, weakness, spasms, sciatica, pain radiating in arm, pain radiating in leg, low back pain  · Endocrine  o Denies  o : diabetes, thyroid disorder  · Psychiatric  o Denies  o : anxiety, depression      Vitals  Date Time BP Position Site L\R Cuff Size HR RR TEMP (F) WT  HT  BMI kg/m2 BSA m2 O2 Sat        2020 08:17 /56 Sitting    68 - R  97.3 160lbs 1oz 5'  8\" 24.34 1.87           Physical Examination  · Constitutional  o Appearance  o : well-nourished, well groomed, in no apparent distress  · Neurologic  o Mental Status Examination  o : "   § Orientation  § : Alert and oriented to person, place, and time.   § Speech/Language  § : intact naming, comprehension, and repetition. No dysarthria.  § Fund of Knowledge  § : Adequate fund of knowledge.  o Cranial Nerves  o : Pupils are equal, round and reactive to light. Extraocular movements are intact. Visual fields are full. Fundoscopic examination reveals sharp disc bilaterally. Sensation in the V1-V3 distribution is intact and symmetric. Muscles of mastication are strong and symmetric. Muscles of facial expression are strong and symmetric. Hearing is intact. Palatal raise is intact and symmetric. Uvula is midline. Shoulder shrug is strong. Tongue protrudes in the midline.  o Motor Examination  o :   § RUE Strength  § : strength normal  § LUE Strength  § : strength normal  § RLE Strength  § : strength normal  § LLE Strength  § : strength normal  o Reflexes  o : 2+ reflexes throughout and symmetric. Negative Henry. Negative Babinski.   o Sensation  o : Intact sensation to light touch, pinprick, vibration and proprioception throughout  o Gait and Station  o : The patient is noted to have a normal, narrow based gait with normal arm swing  o Cerebellar Function  o : intact finger to nose and heel to shin. Rapid alternating movements are intact in the upper and lower extremities.           Assessment  · Spinal stenosis at L4-L5 level     724.02/M48.061  Is currently doing very well after PT. Has weaned off gabapentin. Discussed that if pain returns, we can see her on PRN basis.   · Lumbar radiculopathy     724.4/M54.16    Problems Reconciled  Plan  · Medications  o Medications have been Reconciled  o Transition of Care or Provider Policy  · Instructions  o F/U PRN            Electronically Signed by: JOSEPH Arriaga -Author on August 17, 2020 10:10:00 AM

## 2021-05-14 VITALS
HEIGHT: 68 IN | BODY MASS INDEX: 23.57 KG/M2 | DIASTOLIC BLOOD PRESSURE: 61 MMHG | SYSTOLIC BLOOD PRESSURE: 99 MMHG | HEART RATE: 70 BPM | WEIGHT: 155.5 LBS | OXYGEN SATURATION: 100 %

## 2021-05-14 VITALS
DIASTOLIC BLOOD PRESSURE: 72 MMHG | HEIGHT: 68 IN | SYSTOLIC BLOOD PRESSURE: 101 MMHG | HEART RATE: 64 BPM | BODY MASS INDEX: 23.53 KG/M2 | OXYGEN SATURATION: 97 % | WEIGHT: 155.25 LBS

## 2021-05-14 VITALS — HEART RATE: 68 BPM | OXYGEN SATURATION: 99 % | BODY MASS INDEX: 24.25 KG/M2 | HEIGHT: 68 IN | WEIGHT: 160 LBS

## 2021-05-14 VITALS
BODY MASS INDEX: 23.89 KG/M2 | OXYGEN SATURATION: 100 % | DIASTOLIC BLOOD PRESSURE: 58 MMHG | HEART RATE: 70 BPM | SYSTOLIC BLOOD PRESSURE: 95 MMHG | WEIGHT: 157.12 LBS

## 2021-05-14 NOTE — PROGRESS NOTES
Progress Note      Patient Name: Mercy Dalton   Patient ID: [id number]   Sex: Female   YOB: 1970    Primary Care Provider: Adela RUIZ   Referring Provider: Adela RUIZ    Visit Date: April 2, 2021    Provider: JOSEPH Palencia   Location: [facility]   Location Address: [address] [place] [address], [address]  [place], [place]  [id number]   Location Phone: [phone]          History Of Present Illness  Mercy Dalton is a 51 year old /White female who presents for evaluation and treatment of:      Pt is here for Acute Visit on Hip pain.    hx of lumbar stenosis-she did a round of physical therapy in the past and her symptoms resolved. Pt states that both hips have been bothering her for the last few weeks. Pt having spiking pain down b/l legs when she moves a certain way. Denies changes to the bowel or bladder. Pt has taking IBU 200mg Q6H , doesn't not help. States the pain is worse in the am and pm despite stretches. Denies changes to the bowel or bladder.     Pt states that she does go to Dr. Duran for her back, last time saw him was last Summer-she had a MRI which showed disc bulge L4-5, L5-1 and stenosis. Pt has also went to PT and seemed to help. States she was on Neurontin and requip but stopped to see if the PT would work-states she no longer had the leg pain until recently with the hip pain. States she was having tingling in her tongue that started with the increased dose of Neurontin-reports since she stopped it has almost resolved but she still notices it-states she notices it mostly when going to bed. On exam she is tender to palpation over b/l greater trochanter-discussed possible bursitis. She is also having radiculopathy discussed possible sciatic as well with hx of lumbar stenosis. She would like to try PT for the hip pain since it helped in the past with the back pain-ordered today. Discussed hip x-rays if she does not get relief from PT. Also  ordered b12 and vit D level with her HRA labs. Discussed possible b12 def with the tongue tingling .    states she went part time at [employer]. States her stress level has decreased.     skin lesion on her abdomen-she states she gets lesions from time to time due to skin allergies-she has 2 skin lesions at this time-admits pruritic. States she has triamcinolone to use prn. Discussed Medrol teresa but she defers for now.           Past Medical History  Disease Name Date Onset Notes   Allergic rhinitis --  --    Anemia --  --    Exercise induced Asthma --  --    Muscle cramp --  --    Pap smear for cervical cancer screening --  --    RLS (restless legs syndrome) 07/12/2018 Nature reports a long history of cramping in her feet. Approximate 3-4 years ago, she was started on Requip 1mg daily. She did well for approximately 1 year. At that time, her symptoms return. Her Requip was titrated to 1.5 mg daily. She had been doing well until approximately 3-4 months ago. At that time, she noted recurrence of cramping in her legs and feet primarily at night. She does describe a restless feeling in her legs that does improve she is able to get up and walk around. She was recently written for gabapentin. She has not started this yet. I did discuss our treatment options. Ultimately, the decision was made to start a trial of gabapentin 100 mg at night. This could be titrated in the future. Alternatively, her Requip could be switched to a different DA.    Screening Mammogram 07/2019 [facility]         Past Surgical History  Procedure Name Date Notes   Ablation 2012 Abnormal periods/OBGYN   Colonoscopy 02/21/20 POLYPS, INTERNAL HEMORRHOIDS   Ectopic pregnancy removal 1997, 1999 --    Laparoscopy 1997/1999 Ectopic pregnancy         Medication List  Name Date Started Instructions   Calcium 500 500 mg calcium (1,250 mg) oral tablet,chewable  chew 1 tablet by oral route daily   EpiPen 0.3 mg/0.3 mL injection auto-injector 04/06/2016 inject 0.3  milliliter (0.3 mg) by intramuscular route once as needed for anaphylaxis for 1 day   levothyroxine 25 mcg oral tablet 2020 TAKE ONE TABLET BY MOUTH DAILY for 90 days   mupirocin 2 % topical ointment 2020 apply a small amount to the affected area by topical route 3 times per day   triamcinolone acetonide 0.1 % topical cream 2019 APPLY A THIN LAYER TO AFFECTED AREA(S) THREE TIMES A DAY FOR 2 WEEKS   vitamin B complex oral tablet  take 1 tablet by oral route daily   Vitamin C 1,000 mg oral tablet 03/15/2017 take 1 tablet by oral route daily         Allergy List  Allergen Name Date Reaction Notes   bacitracin --  --  --    Codeine Phosphate --  --  --    Codeine Sulfate --  --  --    flu vaccine ts -(18 yr+) 2013 itchy bumps all over per pt --    formaldehyde --  --  --    Davis Rufus --  --  --    hydrocortisone --  --  --    Latex Exam Gloves --  --  --    neomycin --  --  --    Polymyxin --  --  --    prednisone --  --  --    Septra --  --  --    Tixocortol pivalate (topical corticosteroid) --  --  --          Family Medical History  Disease Name Relative/Age Notes   Family history of skin cancer Aunt/  Grandmother (maternal)/   Aunt - Basal Cell Grandmother - Unknown skin cancer   No family history of colorectal cancer  --    Family history of stroke  --    Family history of diabetes mellitus Father/   --    Family history of liver disease Mother/   --          Reproductive History  Menstrual   Age Menarche: 10   Pregnancy Summary   Total Pregnancies: 3 Full Term: 0 Premature: 0   Ab Induced: 0 Ab Spontaneous: 0 Ectopics: 0   Multiples: 0 Livin         Social History  Finding Status Start/Stop Quantity Notes   Active but no formal exercise --  --/-- --  --    Alcohol Current some day --/-- --  2020 - 2020 - 10/15/2018 - 4 oz/wk   Denies substance abuse Never --/-- --  10/15/2018 -     --  --/-- --  --    No known infection risk --  --/-- --  --    Tobacco Never --/--  "--  04/13/2018 - never 11/14/2017 - never          Immunizations  NameDate Admin Mfg Trade Name Lot Number Route Inj VIS Given VIS Publication   InfluenzaContraindicated 11/21/2019 NE Not Entered  NE NE     Comments:          Review of Systems  · Constitutional  o Denies  o : fever, fatigue, weight loss, weight gain  · Cardiovascular  o Denies  o : lower extremity edema, claudication, chest pressure, palpitations  · Respiratory  o Denies  o : shortness of breath, wheezing, cough, hemoptysis, dyspnea on exertion  · Gastrointestinal  o Denies  o : nausea, vomiting, diarrhea, constipation, abdominal pain  · Integument  o Admits  o : itching, new skin lesions  · Musculoskeletal  o Admits  o : hip pain      Vitals  Date Time BP Position Site L\R Cuff Size HR RR TEMP (F) WT  HT  BMI kg/m2 BSA m2 O2 Sat FR L/min FiO2 HC       04/02/2021 11:05 AM 99/61 Sitting    70 - R   155lbs 8oz 5'  8\" 23.64 1.84 100 %  21%          Physical Examination  · Constitutional  o Appearance  o : alert, in no acute distress, well developed, well-nourished  · Respiratory  o Auscultation of Lungs  o : normal breath sounds throughout, no wheeze, rhonchi, or crackles  · Cardiovascular  o Heart  o : Regular rate and rhythm, Normal S1,S2 , No cardiac murmers, No S3 or S4 gallop or rubs  · Skin and Subcutaneous Tissue  o General Inspection  o : 2 raised skin lesions on abdomen  o Digits and Nails  o : no clubbing, cyanosis, deformities or edema present, normal appearing nails  · Neurologic  o Mental Status Examination  o : alert and oriented to time, place, and person. Gait and Station: normal gait, able to stand without difficulty  · Psychiatric  o Judgment and Insight  o : judgment and insight intact  o Mood and Affect  o : normal mood and affect     TTP over b/l greater trochanter           Assessment  · Sciatica     724.3/M54.30  · Vitamin B12 deficiency     266.2/E53.8  · Tingling     782.0/R20.2  · Routine lab draw     V72.60/Z01.89  · Hip " pain     719.45/M25.559  · Bursitis     727.3/M71.9      Plan  · Orders  o ACO - Pt declines to or was not able to provide an Advance Care Plan or name a Surrogate Decision Maker (1124F) - - 04/02/2021  o Vitamin D (25-Hydroxy) Level (31383) - 782.0/R20.2, V72.60/Z01.89 - 04/02/2021  o ACO-39: Current medications updated and reviewed (1159F, ) - - 04/02/2021  o [facility] - 724.3/M54.30, 719.45/M25.559 - 04/02/2021   [facility] PT  o Vitamin B12 level (20255) - 266.2/E53.8, 782.0/R20.2 - 04/02/2021  · Medications  o diclofenac sodium 75 mg oral tablet,delayed release (DR/EC)   SIG: take 1 tablet (75 mg) by oral route 2 times per day   DISP: (60) Tablet with 1 refills  Discontinued on 04/02/2021     o Medications have been Reconciled  o Transition of Care or Provider Policy  · Instructions  o Take all medications as prescribed/directed.  o Patient was educated/instructed on their diagnosis, treatment and medications prior to discharge from the clinic today.  o Patient instructed to seek medical attention urgently for new or worsening symptoms.  o Call the office with any concerns or questions.  o Bring all medicines with their bottles to each office visit.  o 4 month follow up for physical  o Electronically Identified Patient Education Materials Provided Electronically  · Disposition  o Call or Return if symptoms worsen or persist.            Electronically Signed by: JOSEPH Palencia -Author on April 2, 2021 12:40:45 PM

## 2021-05-14 NOTE — PROGRESS NOTES
Progress Note      Patient Name: Mercy Dalton   Patient ID: [id number]   Sex: Female   YOB: 1970    Primary Care Provider: Adela RUIZ   Referring Provider: Adela RUIZ    Visit Date: April 19, 2021    Provider: Perez Nolasco PA-C   Location: [facility]   Location Address: [address] [place] [address], [address]  [place], [place]  [id number]   Location Phone: [phone]          Chief Complaint  · swollen rt middle finger joint      History Of Present Illness  Mercy Dalton is a 51 year old /White female who presents for evaluation and treatment of: swollen rt middle finger joint.      pt presents today for swollen rt middle finger joint.    pt states the swelling in her rt middle finger joint started about a week ago. pt states a few days ago it was warm to touch and was at it biggest. pt states it stiff and sensative but does not itch. pt denies any injury. no markings of any type of bite.    1 week she woke up with it bruising, sore, tender           Past Medical History  Disease Name Date Onset Notes   Allergic rhinitis --  --    Anemia --  --    Exercise induced Asthma --  --    Muscle cramp --  --    Pap smear for cervical cancer screening --  --    RLS (restless legs syndrome) 07/12/2018 Nature reports a long history of cramping in her feet. Approximate 3-4 years ago, she was started on Requip 1mg daily. She did well for approximately 1 year. At that time, her symptoms return. Her Requip was titrated to 1.5 mg daily. She had been doing well until approximately 3-4 months ago. At that time, she noted recurrence of cramping in her legs and feet primarily at night. She does describe a restless feeling in her legs that does improve she is able to get up and walk around. She was recently written for gabapentin. She has not started this yet. I did discuss our treatment options. Ultimately, the decision was made to start a trial of gabapentin 100 mg at night. This  could be titrated in the future. Alternatively, her Requip could be switched to a different DA.    Screening Mammogram 07/2019 [facility]         Past Surgical History  Procedure Name Date Notes   Ablation 2012 Abnormal periods/   Colonoscopy 02/21/20 POLYPS, INTERNAL HEMORRHOIDS   Ectopic pregnancy removal 1997, 1999 --    Laparoscopy 1997/1999 Ectopic pregnancy         Medication List  Name Date Started Instructions   Calcium 500 500 mg calcium (1,250 mg) oral tablet,chewable  chew 1 tablet by oral route daily   EpiPen 0.3 mg/0.3 mL injection auto-injector 04/06/2016 inject 0.3 milliliter (0.3 mg) by intramuscular route once as needed for anaphylaxis for 1 day   levothyroxine 25 mcg oral tablet 09/16/2020 TAKE ONE TABLET BY MOUTH DAILY for 90 days   mupirocin 2 % topical ointment 09/16/2020 apply a small amount to the affected area by topical route 3 times per day   triamcinolone acetonide 0.1 % topical cream 07/24/2019 APPLY A THIN LAYER TO AFFECTED AREA(S) THREE TIMES A DAY FOR 2 WEEKS   vitamin B complex oral tablet  take 1 tablet by oral route daily   Vitamin C 1,000 mg oral tablet 03/15/2017 take 1 tablet by oral route daily         Allergy List  Allergen Name Date Reaction Notes   bacitracin --  --  --    Codeine Phosphate --  --  --    Codeine Sulfate --  --  --    flu vaccine ts 2011-12(18 yr+) 11/2013 itchy bumps all over per pt --    formaldehyde --  --  --    Davis Rufus --  --  --    hydrocortisone --  --  --    Latex Exam Gloves --  --  --    neomycin --  --  --    Polymyxin --  --  --    prednisone --  --  --    Septra --  --  --    Tixocortol pivalate (topical corticosteroid) --  --  --          Family Medical History  Disease Name Relative/Age Notes   Family history of skin cancer Aunt/  Grandmother (maternal)/   Aunt - Basal Cell Grandmother - Unknown skin cancer   No family history of colorectal cancer  --    Family history of stroke  --    Family history of diabetes mellitus Father/   --   "  Family history of liver disease Mother/   --          Reproductive History  Menstrual   Age Menarche: 10   Pregnancy Summary   Total Pregnancies: 3 Full Term: 0 Premature: 0   Ab Induced: 0 Ab Spontaneous: 0 Ectopics: 0   Multiples: 0 Livin         Social History  Finding Status Start/Stop Quantity Notes   Active but no formal exercise --  --/-- --  --    Alcohol Current some day --/-- --  2020 - 2020 - 10/15/2018 - 4 oz/wk   Denies substance abuse Never --/-- --  10/15/2018 -     --  --/-- --  --    No known infection risk --  --/-- --  --    Tobacco Never --/-- --  2018 - never 2017 - never          Immunizations  NameDate Admin Mfg Trade Name Lot Number Route Inj VIS Given VIS Publication   InfluenzaContraindicated 2019 NE Not Entered  NE NE     Comments:          Review of Systems  · Constitutional  o Denies  o : fever, fatigue, weight loss, weight gain  · Cardiovascular  o Denies  o : lower extremity edema, claudication, chest pressure, palpitations  · Respiratory  o Denies  o : shortness of breath, wheezing, cough, hemoptysis, dyspnea on exertion  · Gastrointestinal  o Denies  o : nausea, vomiting, diarrhea, constipation, abdominal pain      Vitals  Date Time BP Position Site L\R Cuff Size HR RR TEMP (F) WT  HT  BMI kg/m2 BSA m2 O2 Sat FR L/min FiO2 HC       2021 11:51 /72 Sitting    64 - R   155lbs 4oz 5'  8\" 23.61 1.84 97 %            Physical Examination  · Constitutional  o Appearance  o : well developed, well-nourished, no acute distress  · Respiratory  o Respiratory Effort  o : breathing unlabored  o Inspection of Chest  o : chest rise symmetric bilaterally  o Auscultation of Lungs  o : clear to auscultation bilaterally throughout inspiration and expiration  · Cardiovascular  o Heart  o :   § Auscultation of Heart  § : regular rate and rhythm, no murmurs, gallops or rubs  o Peripheral Vascular System  o :   § Extremities  § : no " edema  · Psychiatric  o Mood and Affect  o : mood normal, affect appropriate     Right PIP joint 3rd digit - full rom, edematous, erythematous, warmth  extensor tendon swelling           Assessment  · Screening for depression     V79.0/Z13.89  · Swollen finger     729.81/M79.89  · Finger pain, right     729.5/M79.644  3rd PIP      Plan  · Orders  o ACO-18: Negative screen for clinical depression using a standardized tool () - - 04/19/2021  o ACO-39: Current medications updated and reviewed (1159F, ) - - 04/19/2021  o Finger(s) (Right) [facility] (90886-BK) - - 04/19/2021  o Sed rate (74862) - - 04/19/2021  o CRP (Inflammation) [facility] (34930) - - 04/19/2021  o CBC with Auto Diff [facility] (40299) - - 04/19/2021  · Medications  o diclofenac sodium 75 mg oral tablet,delayed release (DR/EC)   SIG: take 1 tablet (75 mg) by oral route 2 times per day for 14 days   DISP: (28) Tablet with 0 refills  Prescribed on 04/19/2021     o Medications have been Reconciled  o Transition of Care or Provider Policy  · Instructions  o Depression Screen completed and scanned into the EMR under the designated folder within the patient's documents.  o Today's PHQ-9 result is _0__  o Take all medications as prescribed/directed.  o Patient was educated/instructed on their diagnosis, treatment and medications prior to discharge from the clinic today.  o Discussed Covid-19 precautions including, but not limited to, social distancing, avoid touching your face, and hand washing.   · Disposition  o Call or Return if symptoms worsen or persist.  o F/U 2 weeks  o F/U 1 week  o Care Transition            Electronically Signed by: Perez Nolasco PA-C -Author on April 19, 2021 09:24:20 PM

## 2021-05-15 VITALS
DIASTOLIC BLOOD PRESSURE: 70 MMHG | OXYGEN SATURATION: 93 % | SYSTOLIC BLOOD PRESSURE: 96 MMHG | WEIGHT: 159 LBS | BODY MASS INDEX: 24.1 KG/M2 | HEIGHT: 68 IN | TEMPERATURE: 98.2 F | HEART RATE: 92 BPM

## 2021-05-15 VITALS
WEIGHT: 160.06 LBS | DIASTOLIC BLOOD PRESSURE: 56 MMHG | BODY MASS INDEX: 24.26 KG/M2 | HEIGHT: 68 IN | HEART RATE: 68 BPM | SYSTOLIC BLOOD PRESSURE: 104 MMHG | TEMPERATURE: 97.3 F

## 2021-05-15 VITALS
HEIGHT: 68 IN | SYSTOLIC BLOOD PRESSURE: 90 MMHG | HEART RATE: 76 BPM | BODY MASS INDEX: 23.79 KG/M2 | DIASTOLIC BLOOD PRESSURE: 60 MMHG | OXYGEN SATURATION: 99 % | TEMPERATURE: 99.1 F | WEIGHT: 157 LBS

## 2021-05-15 VITALS
TEMPERATURE: 98.1 F | HEIGHT: 68 IN | SYSTOLIC BLOOD PRESSURE: 128 MMHG | DIASTOLIC BLOOD PRESSURE: 63 MMHG | BODY MASS INDEX: 24.42 KG/M2 | HEART RATE: 62 BPM

## 2021-05-15 VITALS
HEART RATE: 76 BPM | WEIGHT: 156.5 LBS | SYSTOLIC BLOOD PRESSURE: 97 MMHG | HEIGHT: 68 IN | BODY MASS INDEX: 23.72 KG/M2 | DIASTOLIC BLOOD PRESSURE: 54 MMHG

## 2021-05-15 VITALS
BODY MASS INDEX: 24.55 KG/M2 | OXYGEN SATURATION: 95 % | HEART RATE: 62 BPM | SYSTOLIC BLOOD PRESSURE: 102 MMHG | HEIGHT: 68 IN | DIASTOLIC BLOOD PRESSURE: 70 MMHG | WEIGHT: 162 LBS

## 2021-05-15 VITALS
WEIGHT: 161.5 LBS | HEART RATE: 81 BPM | SYSTOLIC BLOOD PRESSURE: 117 MMHG | HEIGHT: 68 IN | BODY MASS INDEX: 24.48 KG/M2 | DIASTOLIC BLOOD PRESSURE: 61 MMHG

## 2021-05-16 VITALS
WEIGHT: 163 LBS | HEART RATE: 63 BPM | DIASTOLIC BLOOD PRESSURE: 66 MMHG | RESPIRATION RATE: 16 BRPM | BODY MASS INDEX: 26.2 KG/M2 | HEIGHT: 66 IN | OXYGEN SATURATION: 100 % | SYSTOLIC BLOOD PRESSURE: 117 MMHG

## 2021-05-16 VITALS
BODY MASS INDEX: 23.95 KG/M2 | HEART RATE: 86 BPM | SYSTOLIC BLOOD PRESSURE: 117 MMHG | DIASTOLIC BLOOD PRESSURE: 64 MMHG | HEIGHT: 68 IN | WEIGHT: 158 LBS | OXYGEN SATURATION: 99 %

## 2021-05-16 VITALS
OXYGEN SATURATION: 100 % | WEIGHT: 161 LBS | HEIGHT: 68 IN | SYSTOLIC BLOOD PRESSURE: 104 MMHG | HEART RATE: 62 BPM | DIASTOLIC BLOOD PRESSURE: 70 MMHG | BODY MASS INDEX: 24.4 KG/M2

## 2021-05-16 VITALS
BODY MASS INDEX: 24.43 KG/M2 | SYSTOLIC BLOOD PRESSURE: 103 MMHG | HEART RATE: 78 BPM | WEIGHT: 161.19 LBS | DIASTOLIC BLOOD PRESSURE: 56 MMHG | HEIGHT: 68 IN

## 2021-05-16 VITALS
WEIGHT: 167.37 LBS | BODY MASS INDEX: 25.37 KG/M2 | HEART RATE: 67 BPM | HEIGHT: 68 IN | DIASTOLIC BLOOD PRESSURE: 53 MMHG | SYSTOLIC BLOOD PRESSURE: 104 MMHG

## 2021-05-16 VITALS
HEART RATE: 60 BPM | BODY MASS INDEX: 24.4 KG/M2 | HEIGHT: 68 IN | DIASTOLIC BLOOD PRESSURE: 70 MMHG | WEIGHT: 161 LBS | OXYGEN SATURATION: 100 % | SYSTOLIC BLOOD PRESSURE: 111 MMHG

## 2021-05-16 VITALS
BODY MASS INDEX: 25.11 KG/M2 | WEIGHT: 156.25 LBS | SYSTOLIC BLOOD PRESSURE: 110 MMHG | HEART RATE: 68 BPM | DIASTOLIC BLOOD PRESSURE: 59 MMHG | HEIGHT: 66 IN

## 2021-05-16 VITALS
SYSTOLIC BLOOD PRESSURE: 109 MMHG | DIASTOLIC BLOOD PRESSURE: 65 MMHG | HEART RATE: 65 BPM | HEIGHT: 66 IN | BODY MASS INDEX: 25.23 KG/M2 | WEIGHT: 157 LBS

## 2021-06-28 ENCOUNTER — TRANSCRIBE ORDERS (OUTPATIENT)
Dept: ADMINISTRATIVE | Facility: HOSPITAL | Age: 51
End: 2021-06-28

## 2021-06-28 DIAGNOSIS — Z78.0 POST-MENOPAUSAL: Primary | ICD-10-CM

## 2021-08-19 PROBLEM — J45.990 EXERCISE-INDUCED BRONCHOSPASM: Status: ACTIVE | Noted: 2021-08-19

## 2021-08-19 PROBLEM — J30.9 ALLERGIC RHINITIS: Status: ACTIVE | Noted: 2021-08-19

## 2021-08-19 PROBLEM — R25.2 MUSCLE CRAMP: Status: ACTIVE | Noted: 2021-08-19

## 2021-08-19 PROBLEM — G25.81 RLS (RESTLESS LEGS SYNDROME): Status: ACTIVE | Noted: 2018-07-12

## 2021-08-19 PROBLEM — D64.9 ANEMIA: Status: ACTIVE | Noted: 2021-08-19

## 2021-08-19 RX ORDER — GABAPENTIN 300 MG/1
CAPSULE ORAL
COMMUNITY
End: 2021-08-20

## 2021-08-19 RX ORDER — UREA 10 %
LOTION (ML) TOPICAL
COMMUNITY
End: 2021-10-29

## 2021-08-20 ENCOUNTER — OFFICE VISIT (OUTPATIENT)
Dept: FAMILY MEDICINE CLINIC | Facility: CLINIC | Age: 51
End: 2021-08-20

## 2021-08-20 VITALS
SYSTOLIC BLOOD PRESSURE: 118 MMHG | WEIGHT: 153 LBS | HEIGHT: 68 IN | OXYGEN SATURATION: 99 % | DIASTOLIC BLOOD PRESSURE: 76 MMHG | BODY MASS INDEX: 23.19 KG/M2 | HEART RATE: 75 BPM

## 2021-08-20 DIAGNOSIS — Z12.31 ENCOUNTER FOR SCREENING MAMMOGRAM FOR MALIGNANT NEOPLASM OF BREAST: ICD-10-CM

## 2021-08-20 DIAGNOSIS — J30.2 SEASONAL ALLERGIES: ICD-10-CM

## 2021-08-20 DIAGNOSIS — Z00.00 ANNUAL PHYSICAL EXAM: Primary | ICD-10-CM

## 2021-08-20 DIAGNOSIS — Z78.0 POSTMENOPAUSAL: ICD-10-CM

## 2021-08-20 DIAGNOSIS — R42 DIZZINESS: ICD-10-CM

## 2021-08-20 PROCEDURE — 99396 PREV VISIT EST AGE 40-64: CPT | Performed by: NURSE PRACTITIONER

## 2021-08-20 PROCEDURE — 96372 THER/PROPH/DIAG INJ SC/IM: CPT | Performed by: NURSE PRACTITIONER

## 2021-08-20 RX ORDER — TRIAMCINOLONE ACETONIDE 40 MG/ML
60 INJECTION, SUSPENSION INTRA-ARTICULAR; INTRAMUSCULAR ONCE
Status: COMPLETED | OUTPATIENT
Start: 2021-08-20 | End: 2021-08-20

## 2021-08-20 RX ORDER — TRIAMCINOLONE ACETONIDE 1 MG/G
CREAM TOPICAL 2 TIMES DAILY
COMMUNITY
End: 2021-10-29

## 2021-08-20 RX ORDER — LANOLIN ALCOHOL/MO/W.PET/CERES
1000 CREAM (GRAM) TOPICAL DAILY
COMMUNITY
End: 2021-10-29

## 2021-08-20 RX ORDER — MULTIVIT WITH MINERALS/LUTEIN
250 TABLET ORAL DAILY
COMMUNITY
End: 2021-10-29

## 2021-08-20 RX ADMIN — TRIAMCINOLONE ACETONIDE 60 MG: 40 INJECTION, SUSPENSION INTRA-ARTICULAR; INTRAMUSCULAR at 10:59

## 2021-08-20 NOTE — PROGRESS NOTES
"Chief Complaint  Annual Exam and Dizziness    Subjective          Mercy Dalton presents to [facility]  Patient is here for her annual physical for work, she had her HRA labs on 04/19/2021. She is having some ongoing dizziness every few days. She will get a head rush and feel like everything is spinning.States she has always had motion sickness and headrushes when bending over. States it is much worse now and states at times she has waves of dizziness at times. States the episodes only last a 5-6 sec. States the episodes occur maybe once wk. Denies palptiations. Denies dizziness when turning over in the bed. Admits occ happens when going from sitting to standing. Denies headaches. Denies sinus pressure or drainage. States her nose is always running.     Labs: 4/19/2021  Mammogram: 12/31/2020  Colonoscopy: 02/21/2020  Pap Smear: 12/2020 in [place]  DEXA: scheduled 9/23/21    Due tdap but pt has severe reaction to most vaccines-she gets pruritis and rash with injections. She did take the Moderna vaccines w/o complications.       Objective   Vital Signs:   /76   Pulse 75   Ht 172.7 cm (68\")   Wt 69.4 kg (153 lb)   SpO2 99%   BMI 23.26 kg/m²     Physical Exam  Constitutional:       Appearance: Normal appearance.   HENT:      Right Ear: Hearing, ear canal and external ear normal. A middle ear effusion is present.      Left Ear: Hearing, ear canal and external ear normal. A middle ear effusion is present.      Mouth/Throat:      Lips: Pink.      Mouth: Mucous membranes are moist.      Pharynx: Oropharynx is clear.      Tonsils: No tonsillar exudate or tonsillar abscesses. 2+ on the right. 2+ on the left.   Neck:      Thyroid: No thyroid mass, thyromegaly or thyroid tenderness.   Cardiovascular:      Rate and Rhythm: Normal rate and regular rhythm.      Pulses: Normal pulses.      Heart sounds: Normal heart sounds.   Pulmonary:      Effort: Pulmonary effort is normal.      " Breath sounds: Normal breath sounds.   Musculoskeletal:      Right lower leg: No edema.      Left lower leg: No edema.   Neurological:      General: No focal deficit present.      Mental Status: She is alert and oriented to person, place, and time.   Psychiatric:         Mood and Affect: Mood normal.         Behavior: Behavior normal.        Result Review :     CMP    CMP 4/7/21   Glucose 96   BUN 15   Creatinine 0.82   Sodium 139   Potassium 4.8   Chloride 105   Calcium 9.4   Albumin 4.1   Total Bilirubin 0.42   Alkaline Phosphatase 39 (A)   AST (SGOT) 37   ALT (SGPT) 13   (A) Abnormal value            CBC    CBC 4/7/21 4/19/21   WBC 6.20 6.65   RBC 4.20 4.05 (A)   Hemoglobin 13.4 12.9   Hematocrit 40.8 39.5   MCV 97.1 97.5   MCH 31.9 (A) 31.9 (A)   MCHC 32.8 (A) 32.7 (A)   RDW 12.4 12.4   Platelets 283 279   (A) Abnormal value            Lipid Panel    Lipid Panel 4/7/21   Total Cholesterol 181   Triglycerides 49   HDL Cholesterol 69 (A)   VLDL Cholesterol 10   LDL Cholesterol  102 (A)   (A) Abnormal value       Comments are available for some flowsheets but are not being displayed.           TSH    TSH 4/7/21   TSH 2.290                    Assessment and Plan    Diagnoses and all orders for this visit:    1. Annual physical exam (Primary)    2. Dizziness  -     triamcinolone acetonide (KENALOG-40) injection 60 mg    3. Postmenopausal  -     DEXA Bone Density Axial    4. Encounter for screening mammogram for malignant neoplasm of breast  -     Mammo Screening Bilateral With CAD; Future    5. Seasonal allergies  -     triamcinolone acetonide (KENALOG-40) injection 60 mg        Follow Up   No follow-ups on file.  Patient was given instructions and counseling regarding her condition or for health maintenance advice. Please see specific information pulled into the AVS if appropriate.

## 2021-09-14 ENCOUNTER — OFFICE VISIT (OUTPATIENT)
Dept: FAMILY MEDICINE CLINIC | Facility: CLINIC | Age: 51
End: 2021-09-14

## 2021-09-14 VITALS
DIASTOLIC BLOOD PRESSURE: 52 MMHG | OXYGEN SATURATION: 98 % | HEART RATE: 62 BPM | HEIGHT: 68 IN | TEMPERATURE: 96.9 F | BODY MASS INDEX: 23.31 KG/M2 | WEIGHT: 153.8 LBS | SYSTOLIC BLOOD PRESSURE: 102 MMHG

## 2021-09-14 DIAGNOSIS — R51.9 NONINTRACTABLE HEADACHE, UNSPECIFIED CHRONICITY PATTERN, UNSPECIFIED HEADACHE TYPE: ICD-10-CM

## 2021-09-14 DIAGNOSIS — R42 DIZZINESS: Primary | ICD-10-CM

## 2021-09-14 PROCEDURE — 99213 OFFICE O/P EST LOW 20 MIN: CPT | Performed by: NURSE PRACTITIONER

## 2021-09-14 NOTE — PROGRESS NOTES
"Chief Complaint  Dizziness (Follow up) and Headache    Subjective          Mercy Dalton presents to [facility]  Pt states that her dizziness isn't as intense but is more frequently.  Pt feels like she has a headache at every other day, with no nausea. Pt also feels like the Kenalog injection helped a lot for couple days but then the symptoms returned. Neruro check intact although she does sway slightly with Romberg and Finger-Nose-Finger test. She does have clear fluid behind the left TM but not enough to cause her symptoms. States she gets waves of dizziness. Admits it still occurs when bending over-no carotid bruits noted. Ordered Carotid U/S, MRI brain and PT consult.        She  has a past medical history of Allergic rhinitis, Anemia, Exercise-induced asthma, HGSIL (high grade squamous intraepithelial lesion) on Pap smear of cervix, Hypothyroid, Leg cramps, Muscle cramp, and RLS (restless legs syndrome) (07/12/2018). She also has no past medical history of Hard to intubate, Malignant hyperthermia due to anesthesia, PONV (postoperative nausea and vomiting), or Spinal headache.     Objective   Vital Signs:   /52   Pulse 62   Temp 96.9 °F (36.1 °C)   Ht 172.7 cm (68\")   Wt 69.8 kg (153 lb 12.8 oz)   SpO2 98%   BMI 23.39 kg/m²     Physical Exam  Constitutional:       Appearance: Normal appearance.   HENT:      Right Ear: Hearing, tympanic membrane, ear canal and external ear normal.      Left Ear: Hearing, ear canal and external ear normal. A middle ear effusion is present.   Neck:      Thyroid: No thyroid mass, thyromegaly or thyroid tenderness.   Cardiovascular:      Rate and Rhythm: Normal rate and regular rhythm.      Heart sounds: Normal heart sounds.   Pulmonary:      Effort: Pulmonary effort is normal.      Breath sounds: Examination of the right-upper field reveals wheezing. Examination of the left-upper field reveals wheezing. Wheezing present. "   Musculoskeletal:      Right lower leg: No edema.      Left lower leg: No edema.   Skin:     General: Skin is warm and dry.   Neurological:      General: No focal deficit present.      Mental Status: She is alert and oriented to person, place, and time.      GCS: GCS eye subscore is 4. GCS verbal subscore is 5. GCS motor subscore is 6.      Cranial Nerves: Cranial nerves are intact.      Sensory: Sensation is intact.      Motor: Motor function is intact.      Coordination: Romberg sign positive. Coordination normal. Finger-Nose-Finger Test abnormal. Heel to Arguelles Test normal.      Gait: Gait is intact.      Comments: Sways slightly with Rhomberg and Finger-Nose-Finger test   Psychiatric:         Mood and Affect: Mood normal.         Behavior: Behavior normal.        Result Review :              Current Outpatient Medications:   •  calcium carbonate (OS-KALPANA) 1250 (500 Ca) MG chewable tablet, Calcium 500 500 mg calcium (1,250 mg) oral tablet,chewable chew 1 tablet by oral route daily   Active, Disp: , Rfl:   •  levothyroxine (SYNTHROID, LEVOTHROID) 25 MCG tablet, Take 25 mcg by mouth Daily., Disp: , Rfl:   •  Niacin (VITAMIN B-3 PO), Take  by mouth 1 (One) Time Per Week., Disp: , Rfl:   •  triamcinolone (KENALOG) 0.1 % cream, Apply  topically to the appropriate area as directed 2 (Two) Times a Day., Disp: , Rfl:   •  vitamin B-12 (CYANOCOBALAMIN) 1000 MCG tablet, Take 1,000 mcg by mouth Daily., Disp: , Rfl:   •  vitamin C (ASCORBIC ACID) 250 MG tablet, Take 250 mg by mouth Daily., Disp: , Rfl:   •  vitamin D3 125 MCG (5000 UT) capsule capsule, Take 5,000 Units by mouth Daily., Disp: , Rfl:    Assessment and Plan    Diagnoses and all orders for this visit:    1. Dizziness (Primary)  -     MRI Brain With & Without Contrast; Future  -     Ambulatory Referral to Physical Therapy Evaluate and treat  -     US Carotid Bilateral; Future    2. Nonintractable headache, unspecified chronicity pattern, unspecified headache  type  -     MRI Brain With & Without Contrast; Future  -     Ambulatory Referral to Physical Therapy Evaluate and treat  -     US Carotid Bilateral; Future    Other orders  -     Cancel: Duplex Carotid Ultrasound CAR; Future        Follow Up   Return if symptoms worsen or fail to improve.  Patient was given instructions and counseling regarding her condition or for health maintenance advice. Please see specific information pulled into the AVS if appropriate.     Mercy ADAM Sha  reports that she has never smoked. She has never used smokeless tobacco..               Adela Perkins, APRN

## 2021-09-23 ENCOUNTER — TELEPHONE (OUTPATIENT)
Dept: NEUROSURGERY | Facility: CLINIC | Age: 51
End: 2021-09-23

## 2021-09-23 ENCOUNTER — HOSPITAL ENCOUNTER (OUTPATIENT)
Dept: BONE DENSITY | Facility: HOSPITAL | Age: 51
Discharge: HOME | End: 2021-09-23
Admitting: OBSTETRICS & GYNECOLOGY

## 2021-09-23 DIAGNOSIS — Z78.0 POST-MENOPAUSAL: ICD-10-CM

## 2021-09-23 PROCEDURE — 77080 DXA BONE DENSITY AXIAL: CPT

## 2021-09-23 NOTE — TELEPHONE ENCOUNTER
Caller: Mercy Dalton    Relationship to patient: Self    Best call back number: [phone]    Chief complaint: BACK PAIN AND LEG CRAMPING     Type of visit: FOLLOW UP EXT    Requested date:      If rescheduling, when is the original appointment:      Additional notes:PT IS WANTING TO BE SEEN TO FOLLOW UP ON BACK PAIN AND BILATERAL LEG CRAMPING, KNEES AND BELOW.  LAST MRI 3/10/2020,  HAS MRI OF BRAIN  ON 10/5/2021 AND IF NEEDS TO HAVE MRI; WOULD LIKE FOR IT TO BE SCHEDULED SAME DAY IF POSSIBLE.  OR SHOULD PT BE SEEN FIRST?     PLEASE REVIEW AND ADVISE PT  THANK YOU.

## 2021-09-24 NOTE — TELEPHONE ENCOUNTER
Scheduled patient for follow up 9-28-21 at 3:45 p.m. Would you like to go ahead and place MRI order or wait for appointment?

## 2021-09-28 ENCOUNTER — OFFICE VISIT (OUTPATIENT)
Dept: NEUROSURGERY | Facility: CLINIC | Age: 51
End: 2021-09-28

## 2021-09-28 VITALS
DIASTOLIC BLOOD PRESSURE: 59 MMHG | WEIGHT: 153.2 LBS | SYSTOLIC BLOOD PRESSURE: 108 MMHG | BODY MASS INDEX: 23.22 KG/M2 | HEIGHT: 68 IN

## 2021-09-28 DIAGNOSIS — M48.062 SPINAL STENOSIS, LUMBAR REGION, WITH NEUROGENIC CLAUDICATION: Primary | ICD-10-CM

## 2021-09-28 PROCEDURE — 99213 OFFICE O/P EST LOW 20 MIN: CPT | Performed by: NEUROLOGICAL SURGERY

## 2021-09-28 NOTE — PROGRESS NOTES
Mercy Dalton is a 51 y.o. female that presents with Back Pain       She has spinal stenosis. She had PT previously and improved somewhat. She has had a return of the leg cramps. Gabapentin seems to help with this. The pain will at times go below the knees with tingling. If she hyperextends, she feels some pain. She can only walk a short distance before she starts to feel discomfort over her lateral hip/thigh.      Review of Systems   Musculoskeletal: Positive for arthralgias, back pain and myalgias.        Vitals:    09/28/21 1545   BP: 108/59        Physical Exam  Constitutional:       Appearance: She is normal weight.   Musculoskeletal:      Comments: Mild TTP in the lower lumbar  SLR-     Neurological:      Mental Status: She is alert.      Sensory: No sensory deficit.      Motor: No weakness.      Deep Tendon Reflexes: Reflexes normal.   Psychiatric:         Mood and Affect: Mood normal.             Assessment and Plan {CC Problem List  Visit Diagnosis  ROS  Review (Popup)  Health Maintenance  Quality  BestPractice  Medications  SmartSets  SnapShot Encounters  Media :23}   Problem List Items Addressed This Visit     None      Visit Diagnoses     Spinal stenosis, lumbar region, with neurogenic claudication    -  Primary    Previously at L3-4 and L4-5        We will repeat her lumbar MRI and consider laminectomy based on the results.  Follow Up {Instructions Charge Capture  Follow-up Communications :23}   No follow-ups on file.

## 2021-10-05 ENCOUNTER — HOSPITAL ENCOUNTER (OUTPATIENT)
Dept: CARDIOLOGY | Facility: HOSPITAL | Age: 51
Discharge: HOME | End: 2021-10-05

## 2021-10-05 ENCOUNTER — PREP FOR SURGERY (OUTPATIENT)
Dept: OTHER | Facility: HOSPITAL | Age: 51
End: 2021-10-05

## 2021-10-05 ENCOUNTER — HOSPITAL ENCOUNTER (OUTPATIENT)
Dept: MRI IMAGING | Facility: HOSPITAL | Age: 51
Discharge: HOME | End: 2021-10-05

## 2021-10-05 DIAGNOSIS — R51.9 NONINTRACTABLE HEADACHE, UNSPECIFIED CHRONICITY PATTERN, UNSPECIFIED HEADACHE TYPE: ICD-10-CM

## 2021-10-05 DIAGNOSIS — R42 DIZZINESS: ICD-10-CM

## 2021-10-05 DIAGNOSIS — M48.062 SPINAL STENOSIS, LUMBAR REGION, WITH NEUROGENIC CLAUDICATION: ICD-10-CM

## 2021-10-05 DIAGNOSIS — M48.062 SPINAL STENOSIS, LUMBAR REGION, WITH NEUROGENIC CLAUDICATION: Primary | ICD-10-CM

## 2021-10-05 LAB
BH CV XLRA MEAS LEFT CAROTID BULB EDV: 23 CM/SEC
BH CV XLRA MEAS LEFT CAROTID BULB PSV: 89 CM/SEC
BH CV XLRA MEAS LEFT DIST CCA EDV: 36 CM/SEC
BH CV XLRA MEAS LEFT DIST CCA PSV: 93 CM/SEC
BH CV XLRA MEAS LEFT DIST ICA EDV: 42 CM/SEC
BH CV XLRA MEAS LEFT DIST ICA PSV: 84 CM/SEC
BH CV XLRA MEAS LEFT MID ICA EDV: 37 CM/SEC
BH CV XLRA MEAS LEFT MID ICA PSV: 83 CM/SEC
BH CV XLRA MEAS LEFT PROX CCA EDV: 32 CM/SEC
BH CV XLRA MEAS LEFT PROX CCA PSV: 92 CM/SEC
BH CV XLRA MEAS LEFT PROX ECA EDV: 14 CM/SEC
BH CV XLRA MEAS LEFT PROX ECA PSV: 61 CM/SEC
BH CV XLRA MEAS LEFT PROX ICA EDV: 13 CM/SEC
BH CV XLRA MEAS LEFT PROX ICA PSV: 50 CM/SEC
BH CV XLRA MEAS LEFT VERTEBRAL A EDV: 19 CM/SEC
BH CV XLRA MEAS LEFT VERTEBRAL A PSV: 49 CM/SEC
BH CV XLRA MEAS RIGHT CAROTID BULB EDV: 33 CM/SEC
BH CV XLRA MEAS RIGHT CAROTID BULB PSV: 83 CM/SEC
BH CV XLRA MEAS RIGHT DIST CCA EDV: 30 CM/SEC
BH CV XLRA MEAS RIGHT DIST CCA PSV: 73 CM/SEC
BH CV XLRA MEAS RIGHT DIST ICA EDV: 39 CM/SEC
BH CV XLRA MEAS RIGHT DIST ICA PSV: 79 CM/SEC
BH CV XLRA MEAS RIGHT MID ICA EDV: 37 CM/SEC
BH CV XLRA MEAS RIGHT MID ICA PSV: 76 CM/SEC
BH CV XLRA MEAS RIGHT PROX CCA EDV: 26 CM/SEC
BH CV XLRA MEAS RIGHT PROX CCA PSV: 83 CM/SEC
BH CV XLRA MEAS RIGHT PROX ECA EDV: 14 CM/SEC
BH CV XLRA MEAS RIGHT PROX ECA PSV: 70 CM/SEC
BH CV XLRA MEAS RIGHT PROX ICA EDV: 26 CM/SEC
BH CV XLRA MEAS RIGHT PROX ICA PSV: 68 CM/SEC
BH CV XLRA MEAS RIGHT VERTEBRAL A EDV: 17 CM/SEC
BH CV XLRA MEAS RIGHT VERTEBRAL A PSV: 47 CM/SEC
LEFT ARM BP: NORMAL MMHG
MAXIMAL PREDICTED HEART RATE: 167 BPM
RIGHT ARM BP: NORMAL MMHG
STRESS TARGET HR: 142 BPM

## 2021-10-05 PROCEDURE — 93880 EXTRACRANIAL BILAT STUDY: CPT | Performed by: SURGERY

## 2021-10-05 PROCEDURE — 0 GADOBENATE DIMEGLUMINE 529 MG/ML SOLUTION: Performed by: NURSE PRACTITIONER

## 2021-10-05 PROCEDURE — 72148 MRI LUMBAR SPINE W/O DYE: CPT

## 2021-10-05 PROCEDURE — 93880 EXTRACRANIAL BILAT STUDY: CPT

## 2021-10-05 PROCEDURE — A9577 INJ MULTIHANCE: HCPCS | Performed by: NURSE PRACTITIONER

## 2021-10-05 PROCEDURE — 70553 MRI BRAIN STEM W/O & W/DYE: CPT

## 2021-10-05 RX ORDER — CEFAZOLIN SODIUM 2 G/100ML
2 INJECTION, SOLUTION INTRAVENOUS ONCE
Status: CANCELLED | OUTPATIENT
Start: 2021-10-05 | End: 2021-10-05

## 2021-10-05 RX ADMIN — GADOBENATE DIMEGLUMINE 15 ML: 529 INJECTION, SOLUTION INTRAVENOUS at 11:51

## 2021-10-06 ENCOUNTER — TELEPHONE (OUTPATIENT)
Dept: FAMILY MEDICINE CLINIC | Facility: CLINIC | Age: 51
End: 2021-10-06

## 2021-10-06 ENCOUNTER — TREATMENT (OUTPATIENT)
Dept: PHYSICAL THERAPY | Facility: CLINIC | Age: 51
End: 2021-10-06

## 2021-10-06 DIAGNOSIS — R26.89 IMBALANCE: ICD-10-CM

## 2021-10-06 DIAGNOSIS — R42 DIZZINESS: Primary | ICD-10-CM

## 2021-10-06 PROBLEM — M48.062 SPINAL STENOSIS, LUMBAR REGION, WITH NEUROGENIC CLAUDICATION: Status: ACTIVE | Noted: 2021-10-06

## 2021-10-06 PROCEDURE — 97161 PT EVAL LOW COMPLEX 20 MIN: CPT | Performed by: PHYSICAL THERAPIST

## 2021-10-06 NOTE — PROGRESS NOTES
Physical Therapy Initial Evaluation and Plan of Care    Patient: Mercy Dalton   : 1970  Diagnosis/ICD-10 Code:  Dizziness [R42]  Referring practitioner: Adela Perkins, *  Date of Initial Visit: 10/6/2021  Today's Date: 10/6/2021  Patient seen for 1 sessions           Subjective Questionnaire: DHI: 16% limited      Subjective Evaluation    History of Present Illness  Mechanism of injury: Pt states she has always had bad motion sickness on planes and in cars.  She states bending over has always caused a little dizziness.  About six weeks ago, she would be doing nothing out of the ordinary, such as sitting at computer or walking, and she started experiencing spinning.  The spinning was lasting two to three seconds at a time.  In the last four weeks she experiences a headache after the dizzy spell.  Pt states she was getting the dizzy spells maybe once a day.  Pt states she doesn't get a headache after every one.  Headache intensity is a 4/10 on average.  Pt states bending over and standing up are making it worse.  Pt denies nausea or vomiting.  She reports B tinnitus.  She states if she is walking and it happens then she has to stop.  She also reports increased light sensitivity.    Medical history: unchanged since previous PT    Pain  No pain reported    Diagnostic Tests  MRI studies: normal    Patient Goals  Patient goal: decrease dizziness and headaches           Objective          Ambulation     Observational Gait   Gait: within functional limits   Walking speed and stride length within functional limits.     Functional Assessment     Comments  L wong halpike: negative  R wong halpike: negative  Horizontal canal test: negative    Smooth pursuits horizontal: WNL  Smooth pursuits vertical: slight dizziness  Saccades horizontal: WNL  Saccades vertical: slight dizziness  VOR horizontal: unable to perform due to dizziness  VOR vertical:unable to perform due to dizziness    Romberg on floor: WNL  Romberg  on floor/eyes closed: WNL  Romberg on foam: WNL  Romberg on foam/eyes closed: WNL  Sharpened romberg on floor: WNL  Sharpened romberg on floor/eyes closed: WNL  Sharpened romberg on foam: WNL  Sharpened romberg on foam/eyes closed: WNL  **pt is able to hold each position for 30 seconds, however she has a significant sway on foam surface with eyes closed**        See Exercise, Manual, and Modality Logs for complete treatment.       Assessment & Plan     Assessment  Impairments: activity intolerance, impaired balance, lacks appropriate home exercise program and safety issue  Assessment details: Pt presents with limitations, noted below, that impede her ability to walk, bend over, and perform functional activities.  The patient presents with a diagnosis of dizziness and has signs/symptoms consistent with vestibular neuritis, to include difficulty with gaze stabilization, imbalance, and dizziness with movement, and will benefit from therapeutic exercises, manual therapy, neuromuscular re-education, gait training, canalith repositioning maneuver, and modalities to improve tolerance to functional activities. The skills of a therapist will be required to safely and effectively implement the following treatment plan to restore maximal level of function.     Prognosis: good  Functional Limitations: walking, moving in bed and stooping  Goals  Plan Goals: 1. Mobility: Walking/Moving Around Functional Limitation     LTG 1: 12 weeks:  The patient will demonstrate 6% limitation by achieving a score of 6 on the Dizziness Handicap Inventory.   STATUS:  New   STG 1a: 6 weeks:  The patient will demonstrate 12% limitation by achieving a score of 12 on the Dizziness Handicap Inventory.     STATUS:  New      2. The patient has dizziness.   LTG 2: 12 weeks: The patient will report a 75% improvement in her dizziness in order to improve tolerance to functional activities, such as walking.  STATUS: New   STG 2: 6 weeks: The patient will  report a 50% improvement in her dizziness in order to improve tolerance to functional activities, such as walking.  STATUS: New     TREATMENT:  Manual therapy, neuromuscular re-education, gait training, canalith repositioning maneuver, therapeutic exercise, home exercise instruction, and modalities as needed to include: moist heat, electrical stimulation, and ultrasound.             Plan  Therapy options: will be seen for skilled physical therapy services  Planned therapy interventions: balance/weight-bearing training, gait training, home exercise program, manual therapy, neuromuscular re-education, strengthening and therapeutic activities  Other planned therapy interventions: canalith repositioning maneuver  Frequency: 3x week  Duration in weeks: 12  Treatment plan discussed with: patient        History # of Personal Factors and/or Comorbidities: LOW (0)  Examination of Body System(s): # of elements: LOW (1-2)  Clinical Presentation: STABLE   Clinical Decision Making: LOW       Timed:         Manual Therapy:         mins  93983;     Therapeutic Exercise:         mins  49059;     Neuromuscular Waldo:        mins  59150;    Therapeutic Activity:          mins  47177;     Gait Training:           mins  20098;     Ultrasound:          mins  71364;    Ionto                                   mins   56248  Self Care                            mins   78788  Canalith Repos         mins 70538      Un-Timed:  Electrical Stimulation:         mins  54916 ( );  Dry Needling          mins self-pay  Traction          mins 92151  Low Eval     30     Mins  01421  Mod Eval          Mins  08525  High Eval                            Mins  77982  Re-Eval                               mins  78417        Timed Treatment:   0   mins   Total Treatment:     30   mins    PT SIGNATURE: Latoya Rea, SISI   DATE TREATMENT INITIATED: 10/6/2021    Initial Certification  Certification Period: 1/4/2022  I certify that the therapy services are  furnished while this patient is under my care.  The services outlined above are required by this patient, and will be reviewed every 90 days.     PHYSICIAN: Adela Perkins, APRN      DATE:     Please sign and return via fax to [phone].Thank you, [facility].

## 2021-10-06 NOTE — TELEPHONE ENCOUNTER
----- Message from JOSEPH Chiu sent at 10/5/2021  4:44 PM EDT -----  Minimal plaque in b/l bifurcations otherwise normal

## 2021-10-11 ENCOUNTER — APPOINTMENT (OUTPATIENT)
Dept: GENERAL RADIOLOGY | Facility: HOSPITAL | Age: 51
End: 2021-10-11

## 2021-10-11 ENCOUNTER — HOSPITAL ENCOUNTER (EMERGENCY)
Facility: HOSPITAL | Age: 51
Discharge: ANOTHER HOSPITAL | End: 2021-10-12
Attending: EMERGENCY MEDICINE | Admitting: EMERGENCY MEDICINE

## 2021-10-11 DIAGNOSIS — T07.XXXA MULTIPLE LACERATIONS: ICD-10-CM

## 2021-10-11 DIAGNOSIS — S41.132A PUNCTURE WOUND OF MULTIPLE SITES OF LEFT UPPER EXTREMITY, INITIAL ENCOUNTER: ICD-10-CM

## 2021-10-11 DIAGNOSIS — S68.621A PARTIAL TRAUMATIC AMPUTATION OF LEFT INDEX FINGER THROUGH PHALANX, INITIAL ENCOUNTER: Primary | ICD-10-CM
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
## 2021-10-11 DIAGNOSIS — S41.131A: ICD-10-CM

## 2021-10-11 PROCEDURE — 99283 EMERGENCY DEPT VISIT LOW MDM: CPT

## 2021-10-11 PROCEDURE — 25010000002 ONDANSETRON PER 1 MG: Performed by: NURSE PRACTITIONER

## 2021-10-11 PROCEDURE — 96375 TX/PRO/DX INJ NEW DRUG ADDON: CPT

## 2021-10-11 PROCEDURE — 25010000002 HYDROMORPHONE 1 MG/ML SOLUTION: Performed by: EMERGENCY MEDICINE

## 2021-10-11 PROCEDURE — 73130 X-RAY EXAM OF HAND: CPT

## 2021-10-11 PROCEDURE — 25010000002 FENTANYL CITRATE (PF) 50 MCG/ML SOLUTION: Performed by: EMERGENCY MEDICINE

## 2021-10-11 PROCEDURE — 96376 TX/PRO/DX INJ SAME DRUG ADON: CPT

## 2021-10-11 PROCEDURE — 25010000002 TDAP 5-2.5-18.5 LF-MCG/0.5 SUSPENSION: Performed by: NURSE PRACTITIONER

## 2021-10-11 PROCEDURE — 90471 IMMUNIZATION ADMIN: CPT | Performed by: NURSE PRACTITIONER

## 2021-10-11 PROCEDURE — 96372 THER/PROPH/DIAG INJ SC/IM: CPT

## 2021-10-11 PROCEDURE — 90715 TDAP VACCINE 7 YRS/> IM: CPT | Performed by: NURSE PRACTITIONER

## 2021-10-11 RX ORDER — FENTANYL CITRATE 50 UG/ML
25 INJECTION, SOLUTION INTRAMUSCULAR; INTRAVENOUS
Status: DISCONTINUED | OUTPATIENT
Start: 2021-10-11 | End: 2021-10-12 | Stop reason: HOSPADM

## 2021-10-11 RX ORDER — FENTANYL CITRATE 50 UG/ML
50 INJECTION, SOLUTION INTRAMUSCULAR; INTRAVENOUS
Status: DISCONTINUED | OUTPATIENT
Start: 2021-10-11 | End: 2021-10-12 | Stop reason: HOSPADM

## 2021-10-11 RX ORDER — ONDANSETRON 2 MG/ML
4 INJECTION INTRAMUSCULAR; INTRAVENOUS ONCE
Status: COMPLETED | OUTPATIENT
Start: 2021-10-11 | End: 2021-10-11

## 2021-10-11 RX ORDER — GABAPENTIN 600 MG/1
600 TABLET ORAL NIGHTLY
COMMUNITY
End: 2022-03-23

## 2021-10-11 RX ORDER — SODIUM CHLORIDE 0.9 % (FLUSH) 0.9 %
10 SYRINGE (ML) INJECTION AS NEEDED
Status: DISCONTINUED | OUTPATIENT
Start: 2021-10-11 | End: 2021-10-12 | Stop reason: HOSPADM

## 2021-10-11 RX ORDER — BUPIVACAINE HYDROCHLORIDE 5 MG/ML
10 INJECTION, SOLUTION EPIDURAL; INTRACAUDAL ONCE
Status: COMPLETED | OUTPATIENT
Start: 2021-10-11 | End: 2021-10-11

## 2021-10-11 RX ADMIN — TETANUS TOXOID, REDUCED DIPHTHERIA TOXOID AND ACELLULAR PERTUSSIS VACCINE, ADSORBED 0.5 ML: 5; 2.5; 8; 8; 2.5 SUSPENSION INTRAMUSCULAR at 22:10

## 2021-10-11 RX ADMIN — FENTANYL CITRATE 25 MCG: 0.05 INJECTION, SOLUTION INTRAMUSCULAR; INTRAVENOUS at 22:10

## 2021-10-11 RX ADMIN — BUPIVACAINE HYDROCHLORIDE 10 ML: 5 INJECTION, SOLUTION EPIDURAL; INTRACAUDAL; PERINEURAL at 22:30

## 2021-10-11 RX ADMIN — FENTANYL CITRATE 50 MCG: 50 INJECTION INTRAMUSCULAR; INTRAVENOUS at 22:22

## 2021-10-11 RX ADMIN — HYDROMORPHONE HYDROCHLORIDE 1 MG: 1 INJECTION, SOLUTION INTRAMUSCULAR; INTRAVENOUS; SUBCUTANEOUS at 21:50

## 2021-10-11 RX ADMIN — ONDANSETRON 4 MG: 2 INJECTION INTRAMUSCULAR; INTRAVENOUS at 22:10

## 2021-10-12 VITALS
TEMPERATURE: 98.5 F | DIASTOLIC BLOOD PRESSURE: 60 MMHG | RESPIRATION RATE: 18 BRPM | OXYGEN SATURATION: 93 % | BODY MASS INDEX: 23.29 KG/M2 | HEIGHT: 68 IN | SYSTOLIC BLOOD PRESSURE: 99 MMHG | HEART RATE: 69 BPM

## 2021-10-12 PROCEDURE — 25010000002 FENTANYL CITRATE (PF) 50 MCG/ML SOLUTION: Performed by: EMERGENCY MEDICINE

## 2021-10-12 PROCEDURE — 96375 TX/PRO/DX INJ NEW DRUG ADDON: CPT

## 2021-10-12 PROCEDURE — 96376 TX/PRO/DX INJ SAME DRUG ADON: CPT

## 2021-10-12 PROCEDURE — 25010000002 KETOROLAC TROMETHAMINE PER 15 MG: Performed by: NURSE PRACTITIONER

## 2021-10-12 PROCEDURE — 25010000002 PIPERACILLIN SOD-TAZOBACTAM PER 1 G: Performed by: NURSE PRACTITIONER

## 2021-10-12 PROCEDURE — 96365 THER/PROPH/DIAG IV INF INIT: CPT

## 2021-10-12 RX ORDER — FENTANYL CITRATE 50 UG/ML
25 INJECTION, SOLUTION INTRAMUSCULAR; INTRAVENOUS
Status: DISCONTINUED | OUTPATIENT
Start: 2021-10-12 | End: 2021-10-12 | Stop reason: HOSPADM

## 2021-10-12 RX ORDER — LIDOCAINE HYDROCHLORIDE 10 MG/ML
10 INJECTION, SOLUTION EPIDURAL; INFILTRATION; INTRACAUDAL; PERINEURAL ONCE
Status: COMPLETED | OUTPATIENT
Start: 2021-10-12 | End: 2021-10-12

## 2021-10-12 RX ORDER — KETOROLAC TROMETHAMINE 15 MG/ML
15 INJECTION, SOLUTION INTRAMUSCULAR; INTRAVENOUS ONCE
Status: COMPLETED | OUTPATIENT
Start: 2021-10-12 | End: 2021-10-12

## 2021-10-12 RX ADMIN — FENTANYL CITRATE 25 MCG: 0.05 INJECTION, SOLUTION INTRAMUSCULAR; INTRAVENOUS at 04:57

## 2021-10-12 RX ADMIN — KETOROLAC TROMETHAMINE 15 MG: 15 INJECTION, SOLUTION INTRAMUSCULAR; INTRAVENOUS at 00:52

## 2021-10-12 RX ADMIN — TAZOBACTAM SODIUM AND PIPERACILLIN SODIUM 3.38 G: 375; 3 INJECTION, SOLUTION INTRAVENOUS at 00:05

## 2021-10-12 RX ADMIN — LIDOCAINE HYDROCHLORIDE 10 ML: 10 INJECTION, SOLUTION EPIDURAL; INFILTRATION; INTRACAUDAL; PERINEURAL at 01:34

## 2021-10-12 RX ADMIN — FENTANYL CITRATE 25 MCG: 0.05 INJECTION, SOLUTION INTRAMUSCULAR; INTRAVENOUS at 00:52

## 2021-10-12 NOTE — ED PROVIDER NOTES
This patient was interviewed and evaluated in triage. Orders were written and patient awaiting results.     Nela Vila, APRN  10/12/21 0612

## 2021-10-13 NOTE — ED PROVIDER NOTES
"Subjective   Patient states that her dogs got into a fight and when she went to break them up she was bitten on both hands.  She states that she usually pulls them from behind when they get into a fight but this time she went in the middle of the fight.  She reports there were 3 dogs, a Amharic mastiff, a [place] Lui, and a [place] Lui/great Pyrenees mix.  She says 2 of them are \"alpha female\" and that they cannot be in the same room together and have had multiple fights in the past. All three dogs are fully vaccinated.  She does not know when she last received a tetanus shot.      History provided by:  Patient and spouse   used: No        Review of Systems   Constitutional: Negative for chills and fever.   HENT: Negative for congestion, ear pain and sore throat.    Eyes: Negative for pain.   Respiratory: Negative for cough, chest tightness and shortness of breath.    Cardiovascular: Negative for chest pain.   Gastrointestinal: Negative for abdominal pain, diarrhea, nausea and vomiting.   Genitourinary: Negative for flank pain and hematuria.   Musculoskeletal: Negative for joint swelling.   Skin: Positive for wound (Multiple wounds to bilateral hands and wrists.). Negative for pallor.   Neurological: Negative for seizures and headaches.   All other systems reviewed and are negative.      Past Medical History:   Diagnosis Date   • Allergic rhinitis    • Anemia    • Exercise-induced asthma    • HGSIL (high grade squamous intraepithelial lesion) on Pap smear of cervix    • Hypothyroid    • Leg cramps    • Muscle cramp    • RLS (restless legs syndrome) 07/12/2018    NATURE REPORTS A LONG HISTORY OF CRAMPING IN HER FEET. APPROX. 3-4 YRS AGO, SHE WAS STARTED ON REQUIP 1MG SYMPTOMS RETURN. HER REQUIP WAS TITRATED TO 1.5 MG DAILY. SHE HAD BEEN DOING WELL UNTIL APPROX. 3-4 MONTHS AGO, AT THAT TIME SHE NOTED RECURRENCT OF CRAMPING IN HER LEGS AND FEET PRIMARILY AT NIGHT. SHE DOES DESCRIBE A RESTLESS " FEELING IN HER LEGS THAT DOES IMPROVE SHE IS ABLE TO GET UP.   • Vertigo        Allergies   Allergen Reactions   • Corticosteroids Unknown - Low Severity   • Polymyxin B Unknown - Low Severity   • Solidago (Davis Rufus) Unknown - Low Severity   • Acetaminophen Rash   • Bacitracin Rash   • Codeine Rash   • Flu Virus Vaccine Rash   • Formaldehyde Rash   • Gold-Containing Drug Products Rash   • Latex Rash   • Neomycin Rash   • Polymyxin B-Trimethoprim Rash   • Prednisone Rash   • Septra [Sulfamethoxazole-Trimethoprim] Rash       Past Surgical History:   Procedure Laterality Date   • COLONOSCOPY  02/21/2020    POLYPS, INTERNAL HEMORRHOIDS   • DIAGNOSTIC LAPAROSCOPY     • ECTOPIC PREGNANCY  1997,1999    REMOVAL    • ENDOMETRIAL ABLATION W/ NOVASURE     • LASER ABLATION  2012    ABNORMAL PERIODS/ DR. PRAJAPATI   • LEEP N/A 4/3/2017    Procedure: LOOP ELECTROCAUTERY EXCISION PROCEDURE;  Surgeon: Oly Rivera MD;  [place] [facility] [place] [facility] [place]  Service:        Family History   Problem Relation Age of Onset   • Liver disease Mother    • Diabetes Father         MELLITUS   • Skin cancer Maternal Grandmother         UKNOWN SKIN CANCER   • Skin cancer Other         BASAL CELL    • Stroke Other        Social History     Socioeconomic History   • Marital status:    Tobacco Use   • Smoking status: Never Smoker   • Smokeless tobacco: Never Used   Substance and Sexual Activity   • Alcohol use: Yes     Comment: SOCIALLY   • Drug use: No   • Sexual activity: Never           Objective   Physical Exam  Vitals and nursing note reviewed.   Constitutional:       General: She is awake. She is in acute distress.      Appearance: Normal appearance. She is well-developed, well-groomed and normal weight. She is not ill-appearing, toxic-appearing or diaphoretic.      Comments: Pain reaction   HENT:      Head: Normocephalic and atraumatic.   Eyes:      Conjunctiva/sclera: Conjunctivae normal.      Pupils: Pupils are equal, round, and  reactive to light.   Cardiovascular:      Rate and Rhythm: Normal rate.   Pulmonary:      Effort: Pulmonary effort is normal. No respiratory distress.   Abdominal:      Tenderness: There is no abdominal tenderness.   Musculoskeletal:         General: Swelling, tenderness and signs of injury present.      Right wrist: Swelling, laceration and tenderness present.      Left wrist: Swelling, laceration and tenderness present.      Right hand: Swelling, laceration, tenderness and bony tenderness present. Decreased range of motion. Normal sensation. There is no disruption of two-point discrimination. Normal capillary refill. Normal pulse.      Left hand: Swelling, laceration, tenderness and bony tenderness present. Decreased range of motion. Normal sensation. There is no disruption of two-point discrimination. Normal capillary refill. Normal pulse.        Arms:         Hands:       Cervical back: Normal range of motion and neck supple.      Comments: Bruising and swelling bilateral wrists   Skin:     General: Skin is warm and dry.      Capillary Refill: Capillary refill takes less than 2 seconds.      Findings: Bruising, erythema, signs of injury and laceration present. No rash.      Comments: See musculoskeletal   Neurological:      General: No focal deficit present.      Mental Status: She is alert and oriented to person, place, and time.      Motor: No weakness.   Psychiatric:         Mood and Affect: Mood is anxious. Affect is tearful.         Behavior: Behavior normal. Behavior is cooperative.         Thought Content: Thought content normal.         Laceration Repair    Date/Time: 10/13/2021 6:46 PM  Performed by: Aicha Madison APRN  Authorized by: Ludin Billings DO     Consent:     Consent obtained:  Verbal    Consent given by:  Patient    Risks discussed:  Infection, need for additional repair, nerve damage, pain, poor cosmetic result, poor wound healing, retained foreign body, tendon damage and vascular  damage    Alternatives discussed:  No treatment and delayed treatment  Anesthesia (see MAR for exact dosages):     Anesthesia method:  Local infiltration    Local anesthetic:  Lidocaine 1% w/o epi  Laceration details:     Location:  Finger    Finger location:  L long finger    Length (cm):  1.5  Repair type:     Repair type:  Intermediate  Pre-procedure details:     Preparation:  Patient was prepped and draped in usual sterile fashion and imaging obtained to evaluate for foreign bodies  Exploration:     Hemostasis achieved with:  Direct pressure    Wound exploration: wound explored through full range of motion and entire depth of wound probed and visualized      Wound extent: areolar tissue violated and foreign bodies/material      Foreign bodies/material:  Dog hair    Contaminated: yes (dog hair)    Treatment:     Area cleansed with:  Betadine    Amount of cleaning:  Extensive    Irrigation solution:  Sterile saline    Irrigation method:  Pressure wash and syringe    Visualized foreign bodies/material removed: yes    Skin repair:     Repair method:  Sutures    Suture size:  5-0    Suture material:  Nylon    Suture technique:  Simple interrupted    Number of sutures:  3  Approximation:     Approximation:  Loose  Post-procedure details:     Dressing:  Non-adherent dressing and bulky dressing    Patient tolerance of procedure:  Tolerated well, no immediate complications  Laceration Repair    Date/Time: 10/13/2021 6:58 PM  Performed by: Aicha Madison APRN  Authorized by: Ludin Billings DO     Consent:     Consent obtained:  Verbal    Consent given by:  Patient    Risks discussed:  Infection, need for additional repair, nerve damage, pain, poor cosmetic result, poor wound healing, retained foreign body, tendon damage and vascular damage    Alternatives discussed:  No treatment  Anesthesia (see MAR for exact dosages):     Anesthesia method:  Local infiltration    Local anesthetic:  Lidocaine 1% w/o epi  Laceration  details:     Location:  Shoulder/arm    Shoulder/arm location:  L lower arm    Length (cm):  0.5  Repair type:     Repair type:  Simple  Pre-procedure details:     Preparation:  Patient was prepped and draped in usual sterile fashion and imaging obtained to evaluate for foreign bodies  Exploration:     Hemostasis achieved with:  Direct pressure    Wound exploration: wound explored through full range of motion and entire depth of wound probed and visualized      Wound extent: areolar tissue violated      Contaminated: yes    Treatment:     Area cleansed with:  Betadine    Amount of cleaning:  Extensive    Irrigation solution:  Sterile saline    Irrigation method:  Pressure wash and syringe    Visualized foreign bodies/material removed: no    Skin repair:     Repair method:  Sutures    Suture size:  5-0    Suture material:  Nylon    Suture technique:  Simple interrupted    Number of sutures:  1  Approximation:     Approximation:  Loose  Post-procedure details:     Dressing:  Adhesive bandage    Patient tolerance of procedure:  Tolerated well, no immediate complications  Laceration Repair    Date/Time: 10/13/2021 6:59 PM  Performed by: Aicha Madison APRN  Authorized by: Ludin Billings DO     Consent:     Consent obtained:  Verbal    Consent given by:  Patient    Risks discussed:  Infection, need for additional repair, nerve damage, poor wound healing, poor cosmetic result, pain, retained foreign body, tendon damage and vascular damage    Alternatives discussed:  No treatment  Anesthesia (see MAR for exact dosages):     Anesthesia method:  Local infiltration    Local anesthetic:  Lidocaine 1% w/o epi  Laceration details:     Location:  Shoulder/arm    Shoulder/arm location:  R lower arm    Length (cm):  2  Repair type:     Repair type:  Simple  Pre-procedure details:     Preparation:  Patient was prepped and draped in usual sterile fashion and imaging obtained to evaluate for foreign bodies  Exploration:      Hemostasis achieved with:  Direct pressure    Wound exploration: wound explored through full range of motion and entire depth of wound probed and visualized      Wound extent: areolar tissue violated      Contaminated: yes    Treatment:     Area cleansed with:  Betadine    Amount of cleaning:  Extensive    Irrigation solution:  Sterile saline    Irrigation method:  Pressure wash and syringe    Visualized foreign bodies/material removed: no    Skin repair:     Repair method:  Sutures    Suture size:  5-0    Suture material:  Nylon    Suture technique:  Simple interrupted    Number of sutures:  3  Approximation:     Approximation:  Loose  Post-procedure details:     Dressing:  Adhesive bandage    Patient tolerance of procedure:  Tolerated well, no immediate complications  Laceration Repair    Date/Time: 10/13/2021 7:09 PM  Performed by: Aicha Madison APRN  Authorized by: Ludin Billings DO     Consent:     Consent obtained:  Verbal    Consent given by:  Patient    Risks discussed:  Infection, pain, retained foreign body, tendon damage, vascular damage, poor wound healing, poor cosmetic result, need for additional repair and nerve damage    Alternatives discussed:  No treatment  Anesthesia (see MAR for exact dosages):     Anesthesia method:  Local infiltration    Local anesthetic:  Lidocaine 1% w/o epi  Laceration details:     Location:  Hand    Hand location:  R palm    Length (cm):  1  Repair type:     Repair type:  Intermediate  Pre-procedure details:     Preparation:  Patient was prepped and draped in usual sterile fashion and imaging obtained to evaluate for foreign bodies  Exploration:     Hemostasis achieved with:  Direct pressure    Wound exploration: wound explored through full range of motion and entire depth of wound probed and visualized      Wound extent: areolar tissue violated      Contaminated: yes    Treatment:     Area cleansed with:  Betadine    Amount of cleaning:  Extensive    Irrigation  solution:  Sterile saline    Irrigation method:  Pressure wash and syringe    Visualized foreign bodies/material removed: no    Skin repair:     Repair method:  Sutures    Suture size:  5-0    Suture material:  Nylon    Suture technique:  Simple interrupted    Number of sutures:  2  Approximation:     Approximation:  Loose  Post-procedure details:     Dressing:  Adhesive bandage    Patient tolerance of procedure:  Tolerated well, no immediate complications               ED Course                                           MDM  Number of Diagnoses or Management Options  Dog bite, initial encounter: new and requires workup  Multiple lacerations: new and requires workup  Partial traumatic amputation of left index finger through phalanx, initial encounter: new and requires workup  Puncture wound of arm, multiple sites, right, initial encounter: new and requires workup  Puncture wound of multiple sites of left upper extremity, initial encounter: new and requires workup  Diagnosis management comments: Patient did not want to go [facility] by private vehicle with her  and wants to wait for EMS transfer.  She was informed there would be a significant delay due to EMS availability and that there may be concerns with insurance coverage due to medical necessity requirements.       Amount and/or Complexity of Data Reviewed  Tests in the radiology section of CPT®: reviewed  Discuss the patient with other providers: yes  Independent visualization of images, tracings, or specimens: yes    Patient Progress  Patient progress: stable      Final diagnoses:   Partial traumatic amputation of left index finger through phalanx, initial encounter   Dog bite, initial encounter   Puncture wound of multiple sites of left upper extremity, initial encounter   Puncture wound of arm, multiple sites, right, initial encounter   Multiple lacerations       ED Disposition  ED Disposition     ED Disposition Condition Comment     Transfer to Another Facility   [facility]          No follow-up provider specified.       Medication List      No changes were made to your prescriptions during this visit.          Aicha Madison APRN  10/13/21 2000       Ludin Billings DO  10/15/21 105

## 2021-10-21 ENCOUNTER — OFFICE VISIT (OUTPATIENT)
Dept: FAMILY MEDICINE CLINIC | Facility: CLINIC | Age: 51
End: 2021-10-21

## 2021-10-21 VITALS
HEART RATE: 64 BPM | SYSTOLIC BLOOD PRESSURE: 107 MMHG | HEIGHT: 68 IN | DIASTOLIC BLOOD PRESSURE: 62 MMHG | WEIGHT: 153 LBS | OXYGEN SATURATION: 94 % | BODY MASS INDEX: 23.19 KG/M2

## 2021-10-21 DIAGNOSIS — M54.9 BACK PAIN, UNSPECIFIED BACK LOCATION, UNSPECIFIED BACK PAIN LATERALITY, UNSPECIFIED CHRONICITY: ICD-10-CM

## 2021-10-21 DIAGNOSIS — R42 VERTIGO: ICD-10-CM
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
## 2021-10-21 PROCEDURE — 99213 OFFICE O/P EST LOW 20 MIN: CPT | Performed by: NURSE PRACTITIONER

## 2021-10-21 NOTE — PROGRESS NOTES
"Chief Complaint  Suture / Staple Removal    Subjective          Mercy Dalton presents to [facility]  Pt states that last Monday October 11th, she was bitten while trying to break her dogs from fighting. Pt needs the suture removed from her right wrist, right palm, left arm ,and left middle finger today. Pt has been cleaning her wounds with soap and water.She did get a tetanus shot in the ER    She started PT for the vertigo-states she was only able to do one session due to the dog bite-reports the vertigo has gotten a little better.    She is liset for back surgery on 11/10/21. Stable multilevel degenerative disc disease and degenerative facet change as detailed above.    There is persistent moderate canal stenosis at the L3/4 and L4/5 levels which appears similar to   the prior study.    Suture / Staple Removal  The sutures were placed 7 to 10 days ago. She tried regular soap and water washings since the wound repair. Her temperature was unmeasured prior to arrival. There has been no drainage from the wound. There is no redness present. The swelling has improved (left middle finger). There is no pain present. There is difficulty moving the extremity or digit due to weakness (d/t swelling).       She  has a past medical history of Allergic rhinitis, Anemia, Exercise-induced asthma, HGSIL (high grade squamous intraepithelial lesion) on Pap smear of cervix, Hypothyroid, Leg cramps, Muscle cramp, RLS (restless legs syndrome) (07/12/2018), and Vertigo. She also has no past medical history of Hard to intubate, Malignant hyperthermia due to anesthesia, PONV (postoperative nausea and vomiting), or Spinal headache.     Objective   Vital Signs:   /62 (BP Location: Right arm, Patient Position: Sitting, Cuff Size: Adult)   Pulse 64   Ht 172.7 cm (68\")   Wt 69.4 kg (153 lb)   SpO2 94%   BMI 23.26 kg/m²     Physical Exam   Result Review :   The following data was reviewed by: Adela" JOSEPH Nichole on 10/21/2021:    Data reviewed: ER reports   Suture Removal    Date/Time: 10/21/2021 9:01 AM  Performed by: Adela Perkins APRN  Authorized by: Adela Perkins APRN   Body area: upper extremity  Location details: right wrist  Wound Appearance: clean  Sutures Removed: 10  Post-removal: dressing applied and antibiotic ointment applied  Patient tolerance: patient tolerated the procedure well with no immediate complications  Comments: Removed 10 sutures from right hand, right wrist, left forearm and left middle finger. One steri strip applied to right wrist.               Current Outpatient Medications:   •  amoxicillin-clavulanate (AUGMENTIN) 500-125 MG per tablet, Take 1 tablet by mouth Every 8 (Eight) Hours., Disp: 30 tablet, Rfl: 0  •  calcium carbonate (OS-KALPANA) 1250 (500 Ca) MG chewable tablet, Calcium 500 500 mg calcium (1,250 mg) oral tablet,chewable chew 1 tablet by oral route daily   Active, Disp: , Rfl:   •  gabapentin (NEURONTIN) 600 MG tablet, Take 600 mg by mouth Daily., Disp: , Rfl:   •  ibuprofen (ADVIL,MOTRIN) 600 MG tablet, Take 1 tablet by mouth Every 8 (Eight) Hours., Disp: 21 tablet, Rfl: 0  •  levothyroxine (SYNTHROID, LEVOTHROID) 25 MCG tablet, Take 25 mcg by mouth Daily., Disp: , Rfl:   •  Niacin (VITAMIN B-3 PO), Take  by mouth 1 (One) Time Per Week., Disp: , Rfl:   •  triamcinolone (KENALOG) 0.1 % cream, Apply  topically to the appropriate area as directed 2 (Two) Times a Day., Disp: , Rfl:   •  vitamin B-12 (CYANOCOBALAMIN) 1000 MCG tablet, Take 1,000 mcg by mouth Daily., Disp: , Rfl:   •  vitamin C (ASCORBIC ACID) 250 MG tablet, Take 250 mg by mouth Daily., Disp: , Rfl:   •  vitamin D3 125 MCG (5000 UT) capsule capsule, Take 5,000 Units by mouth Daily., Disp: , Rfl:    Assessment and Plan    Diagnoses and all orders for this visit:    1. Dog bite, initial encounter (Primary)  Comments:  suffered dog bite on 10/11/21 when breaking her dogs up from a fight  -she suffered multiple injuries. She is seeing klutz and kleinert for the hand injury  Orders:  -     Suture Removal    2. Vertigo  Comments:  reports she had one session of PT then she had the dog bite-she is scheduled to restart next wk.    3. Back pain, unspecified back location, unspecified back pain laterality, unspecified chronicity  Comments:  she is scheduled to have surgery by  on 11/10/21        Follow Up   No follow-ups on file.  Patient was given instructions and counseling regarding her condition or for health maintenance advice. Please see specific information pulled into the AVS if appropriate.     Mercy ADAM Sha  reports that she has never smoked. She has never used smokeless tobacco.              Adela Perkins, APRN

## 2021-10-26 ENCOUNTER — TELEPHONE (OUTPATIENT)
Dept: PHYSICAL THERAPY | Facility: CLINIC | Age: 51
End: 2021-10-26

## 2021-10-29 RX ORDER — MULTIVIT WITH MINERALS/LUTEIN
1000 TABLET ORAL DAILY
COMMUNITY
End: 2022-12-08

## 2021-10-29 RX ORDER — PHENOL 1.4 %
600 AEROSOL, SPRAY (ML) MUCOUS MEMBRANE DAILY
COMMUNITY

## 2021-10-29 RX ORDER — THIAMINE HCL 100 MG
2500 TABLET ORAL DAILY
COMMUNITY
End: 2022-09-08

## 2021-10-29 RX ORDER — MULTIVIT-MIN/IRON/FOLIC ACID/K 18-600-40
2000 CAPSULE ORAL DAILY
COMMUNITY
End: 2022-09-08

## 2021-11-04 ENCOUNTER — ANESTHESIA EVENT (OUTPATIENT)
Dept: PERIOP | Facility: HOSPITAL | Age: 51
End: 2021-11-04

## 2021-11-04 ENCOUNTER — TREATMENT (OUTPATIENT)
Dept: PHYSICAL THERAPY | Facility: CLINIC | Age: 51
End: 2021-11-04

## 2021-11-04 DIAGNOSIS — R42 DIZZINESS: Primary | ICD-10-CM

## 2021-11-04 DIAGNOSIS — R26.89 IMBALANCE: ICD-10-CM

## 2021-11-04 PROCEDURE — 97112 NEUROMUSCULAR REEDUCATION: CPT | Performed by: PHYSICAL THERAPIST

## 2021-11-04 NOTE — PROGRESS NOTES
Progress Assessment        Patient: Mercy Dalton   : 1970  Diagnosis/ICD-10 Code:  Dizziness [R42]  Referring practitioner: Adela Perkins, *  Date of Initial Visit: Type: THERAPY  Noted: 10/6/2021  Today's Date: 2021  Patient seen for 2 sessions      Subjective:   Mercy Dalton reports: she was doing the exercises at home and noticing some improvements in her symptoms and then she injured her finger.  She was taking pain medication and not having much dizziness.  She states she is now noticing her dizziness a little bit but not as much as when she started therapy.  She reports she had Fit testing the other day and had dizziness with those movements (bending over, etc.).  She reports she has added her exercises back in at home and they are easier than when she first started therapy.     Subjective Questionnaire: DHI: 12% limited  Clinical Progress: improved  Home Program Compliance: Yes  Treatment has included: therapeutic exercise and neuromuscular re-education    Subjective     Objective   Smooth pursuits horizontal: WNL  Smooth pursuits vertical: WNL  Saccades horizontal: slight dizziness  Saccades vertical: WNL  VOR horizontal: slight dizziness  VOR vertical: WNL    Pt has a slight balance deficit with her eyes closed but is able to maintain positions (she is just unsteady).    Goals  Plan Goals: 1. Mobility: Walking/Moving Around Functional Limitation                       LTG 1: 12 weeks:  The patient will demonstrate 6% limitation by achieving a score of 6 on the Dizziness Handicap Inventory.   STATUS:  progressing  STG 1a: 6 weeks:  The patient will demonstrate 12% limitation by achieving a score of 12 on the Dizziness Handicap Inventory.     STATUS:  met     2. The patient has dizziness.   LTG 2: 12 weeks: The patient will report a 75% improvement in her dizziness in order to improve tolerance to functional activities, such as walking.  STATUS: progressing  STG 2: 6 weeks: The patient  will report a 50% improvement in her dizziness in order to improve tolerance to functional activities, such as walking.  STATUS: progressing     Assessment/Plan  Pt has not had any treatment session since her initial evaluation due to other medical issues.  Pt shows improvements in her ability to perform all gaze stabilization exercises since her initial evaluation.  She continues to have a slight balance deficit with her eyes closed.  She has the most difficulty with VOR horizontal/vertical.  She had a 4 point improvement in DHI score.  She will benefit from continuing with therapy to address remaining deficits.    Progress toward previous goals: Partially Met    See Exercise, Manual, and Modality Logs for complete treatment.         Recommendations: Continue as planned  Timeframe: 3 months  Prognosis to achieve goals: good    PT Signature: Electronically signed by Latoya Rea PT  [place] License: [id number]      Based upon review of the patient's progress and continued therapy plan, it is my medical opinion that Mercy Dalton should continue physical therapy treatment at [facility]  [address]  [place] [address] [phone].      Timed:         Manual Therapy:         mins  25750;     Therapeutic Exercise:         mins  78842;     Neuromuscular Waldo:    30    mins  70810;    Therapeutic Activity:          mins  42607;     Gait Training:           mins  87828;     Ultrasound:          mins  49560;    Ionto                                   mins   47533  Self Care                            mins   38209  Aquatic                               mins 90775      Un-Timed:  Electrical Stimulation:         mins  92553 ( );  Dry Needling          mins self-pay  Traction          mins 77393  Low Eval          Mins  95909  Mod Eval          Mins  33109  High Eval                            Mins  10812  Re-Eval                               mins  52710      Timed  Treatment:   30   mins   Total Treatment:     30   mins      I certify that the therapy services are furnished while this patient is under my care.  The services outlined above are required by this patient, and will be reviewed every 90 days.

## 2021-11-09 ENCOUNTER — TREATMENT (OUTPATIENT)
Dept: PHYSICAL THERAPY | Facility: CLINIC | Age: 51
End: 2021-11-09

## 2021-11-09 PROCEDURE — 97112 NEUROMUSCULAR REEDUCATION: CPT | Performed by: PHYSICAL THERAPIST

## 2021-11-09 NOTE — PROGRESS NOTES
Physical Therapy Daily Progress Note    VISIT#: 3    Subjective   Mercy Dalton reports her dizziness is not any worse.  She states she has been performing her exercises at home mostly twice a day.  She states she has a small headache this morning.      Objective     See Exercise, Manual, and Modality Logs for complete treatment.     Patient Education: advance gaze stabilization exercises to a romberg position    Assessment/Plan  Habituation, balance, and gaze stabilization exercises were all advanced today with good tolerance.  Pt requires CGA during exercises due to balance loss.      Progress per Plan of Care            Timed:         Manual Therapy:         mins  02180;     Therapeutic Exercise:         mins  16130;     Neuromuscular Waldo:   28     mins  85135;    Therapeutic Activity:          mins  43968;     Gait Training:           mins  09133;     Ultrasound:          mins  94300;    Ionto                                   mins   47022  Self Care                            mins   22632  Canalith Repos                   mins  87503    Un-Timed:  Electrical Stimulation:         mins  00905 ( );  Dry Needling          mins self-pay  Traction          mins 15373  Low Eval          Mins  17928  Mod Eval          Mins  72958  High Eval                            Mins  64711  Re-Eval                               mins  22967    Timed Treatment:   28   mins   Total Treatment:     28   mins    Latoya Rea PT  Physical Therapist  [place] License: [id number]

## 2021-11-10 ENCOUNTER — HOSPITAL ENCOUNTER (OUTPATIENT)
Facility: HOSPITAL | Age: 51
Setting detail: HOSPITAL OUTPATIENT SURGERY
Discharge: HOME | End: 2021-11-10
Attending: NEUROLOGICAL SURGERY | Admitting: NEUROLOGICAL SURGERY

## 2021-11-10 ENCOUNTER — ANESTHESIA (OUTPATIENT)
Dept: PERIOP | Facility: HOSPITAL | Age: 51
End: 2021-11-10

## 2021-11-10 ENCOUNTER — APPOINTMENT (OUTPATIENT)
Dept: GENERAL RADIOLOGY | Facility: HOSPITAL | Age: 51
End: 2021-11-10

## 2021-11-10 VITALS
HEIGHT: 68 IN | TEMPERATURE: 97.5 F | DIASTOLIC BLOOD PRESSURE: 53 MMHG | OXYGEN SATURATION: 95 % | HEART RATE: 56 BPM | SYSTOLIC BLOOD PRESSURE: 90 MMHG | RESPIRATION RATE: 14 BRPM | BODY MASS INDEX: 23.36 KG/M2 | WEIGHT: 154.1 LBS

## 2021-11-10 DIAGNOSIS — M48.062 SPINAL STENOSIS, LUMBAR REGION, WITH NEUROGENIC CLAUDICATION: ICD-10-CM

## 2021-11-10 LAB — QT INTERVAL: 363 MS

## 2021-11-10 PROCEDURE — 25010000002 PROPOFOL 10 MG/ML EMULSION: Performed by: NURSE ANESTHETIST, CERTIFIED REGISTERED

## 2021-11-10 PROCEDURE — 63047 LAM FACETEC & FORAMOT LUMBAR: CPT | Performed by: SPECIALIST/TECHNOLOGIST, OTHER

## 2021-11-10 PROCEDURE — 63048 LAM FACETEC &FORAMOT EA ADDL: CPT | Performed by: SPECIALIST/TECHNOLOGIST, OTHER

## 2021-11-10 PROCEDURE — C1889 IMPLANT/INSERT DEVICE, NOC: HCPCS | Performed by: NEUROLOGICAL SURGERY

## 2021-11-10 PROCEDURE — 25010000002 MIDAZOLAM PER 1MG: Performed by: ANESTHESIOLOGY

## 2021-11-10 PROCEDURE — 93010 ELECTROCARDIOGRAM REPORT: CPT | Performed by: SPECIALIST

## 2021-11-10 PROCEDURE — 63047 LAM FACETEC & FORAMOT LUMBAR: CPT | Performed by: NEUROLOGICAL SURGERY

## 2021-11-10 PROCEDURE — 25010000002 HYDROMORPHONE 1 MG/ML SOLUTION: Performed by: NURSE ANESTHETIST, CERTIFIED REGISTERED

## 2021-11-10 PROCEDURE — 25010000002 FENTANYL CITRATE (PF) 50 MCG/ML SOLUTION: Performed by: NURSE ANESTHETIST, CERTIFIED REGISTERED

## 2021-11-10 PROCEDURE — 0 MEPERIDINE PER 100 MG: Performed by: NURSE ANESTHETIST, CERTIFIED REGISTERED

## 2021-11-10 PROCEDURE — 63048 LAM FACETEC &FORAMOT EA ADDL: CPT | Performed by: NEUROLOGICAL SURGERY

## 2021-11-10 PROCEDURE — 76000 FLUOROSCOPY <1 HR PHYS/QHP: CPT

## 2021-11-10 PROCEDURE — 0 CEFAZOLIN IN DEXTROSE 2-4 GM/100ML-% SOLUTION: Performed by: NEUROLOGICAL SURGERY

## 2021-11-10 PROCEDURE — 25010000002 ONDANSETRON PER 1 MG: Performed by: NURSE ANESTHETIST, CERTIFIED REGISTERED

## 2021-11-10 PROCEDURE — 93005 ELECTROCARDIOGRAM TRACING: CPT | Performed by: ANESTHESIOLOGY

## 2021-11-10 PROCEDURE — S0260 H&P FOR SURGERY: HCPCS | Performed by: NEUROLOGICAL SURGERY

## 2021-11-10 DEVICE — HEMOST ABS SURGIFOAM SZ12/7 2X6 7MM: Type: IMPLANTABLE DEVICE | Site: BACK | Status: FUNCTIONAL

## 2021-11-10 RX ORDER — SODIUM CHLORIDE, SODIUM LACTATE, POTASSIUM CHLORIDE, CALCIUM CHLORIDE 600; 310; 30; 20 MG/100ML; MG/100ML; MG/100ML; MG/100ML
9 INJECTION, SOLUTION INTRAVENOUS CONTINUOUS PRN
Status: DISCONTINUED | OUTPATIENT
Start: 2021-11-10 | End: 2021-11-10 | Stop reason: HOSPADM

## 2021-11-10 RX ORDER — MIDAZOLAM HYDROCHLORIDE 2 MG/2ML
2 INJECTION, SOLUTION INTRAMUSCULAR; INTRAVENOUS ONCE
Status: COMPLETED | OUTPATIENT
Start: 2021-11-10 | End: 2021-11-10

## 2021-11-10 RX ORDER — ONDANSETRON 2 MG/ML
4 INJECTION INTRAMUSCULAR; INTRAVENOUS ONCE AS NEEDED
Status: DISCONTINUED | OUTPATIENT
Start: 2021-11-10 | End: 2021-11-10 | Stop reason: HOSPADM

## 2021-11-10 RX ORDER — OXYCODONE HYDROCHLORIDE 5 MG/1
5 TABLET ORAL
Status: DISCONTINUED | OUTPATIENT
Start: 2021-11-10 | End: 2021-11-10 | Stop reason: HOSPADM

## 2021-11-10 RX ORDER — OXYCODONE HYDROCHLORIDE AND ACETAMINOPHEN 5; 325 MG/1; MG/1
1 TABLET ORAL EVERY 4 HOURS PRN
Qty: 25 TABLET | Refills: 0 | Status: SHIPPED | OUTPATIENT
Start: 2021-11-10 | End: 2022-03-23

## 2021-11-10 RX ORDER — PROMETHAZINE HYDROCHLORIDE 25 MG/1
25 SUPPOSITORY RECTAL ONCE AS NEEDED
Status: DISCONTINUED | OUTPATIENT
Start: 2021-11-10 | End: 2021-11-10 | Stop reason: HOSPADM

## 2021-11-10 RX ORDER — LIDOCAINE HYDROCHLORIDE 20 MG/ML
INJECTION, SOLUTION INFILTRATION; PERINEURAL AS NEEDED
Status: DISCONTINUED | OUTPATIENT
Start: 2021-11-10 | End: 2021-11-10 | Stop reason: SURG

## 2021-11-10 RX ORDER — ONDANSETRON 2 MG/ML
INJECTION INTRAMUSCULAR; INTRAVENOUS AS NEEDED
Status: DISCONTINUED | OUTPATIENT
Start: 2021-11-10 | End: 2021-11-10 | Stop reason: SURG

## 2021-11-10 RX ORDER — MEPERIDINE HYDROCHLORIDE 25 MG/ML
12.5 INJECTION INTRAMUSCULAR; INTRAVENOUS; SUBCUTANEOUS
Status: DISCONTINUED | OUTPATIENT
Start: 2021-11-10 | End: 2021-11-10 | Stop reason: HOSPADM

## 2021-11-10 RX ORDER — ROCURONIUM BROMIDE 10 MG/ML
INJECTION, SOLUTION INTRAVENOUS AS NEEDED
Status: DISCONTINUED | OUTPATIENT
Start: 2021-11-10 | End: 2021-11-10 | Stop reason: SURG

## 2021-11-10 RX ORDER — PROMETHAZINE HYDROCHLORIDE 12.5 MG/1
25 TABLET ORAL ONCE AS NEEDED
Status: DISCONTINUED | OUTPATIENT
Start: 2021-11-10 | End: 2021-11-10 | Stop reason: HOSPADM

## 2021-11-10 RX ORDER — PROPOFOL 10 MG/ML
VIAL (ML) INTRAVENOUS AS NEEDED
Status: DISCONTINUED | OUTPATIENT
Start: 2021-11-10 | End: 2021-11-10 | Stop reason: SURG

## 2021-11-10 RX ORDER — BUPIVACAINE HYDROCHLORIDE AND EPINEPHRINE 5; 5 MG/ML; UG/ML
INJECTION, SOLUTION EPIDURAL; INTRACAUDAL; PERINEURAL AS NEEDED
Status: DISCONTINUED | OUTPATIENT
Start: 2021-11-10 | End: 2021-11-10 | Stop reason: HOSPADM

## 2021-11-10 RX ORDER — FENTANYL CITRATE 50 UG/ML
INJECTION, SOLUTION INTRAMUSCULAR; INTRAVENOUS AS NEEDED
Status: DISCONTINUED | OUTPATIENT
Start: 2021-11-10 | End: 2021-11-10 | Stop reason: SURG

## 2021-11-10 RX ORDER — ACETAMINOPHEN 500 MG
1000 TABLET ORAL ONCE
Status: COMPLETED | OUTPATIENT
Start: 2021-11-10 | End: 2021-11-10

## 2021-11-10 RX ORDER — CEFAZOLIN SODIUM 2 G/100ML
2 INJECTION, SOLUTION INTRAVENOUS ONCE
Status: COMPLETED | OUTPATIENT
Start: 2021-11-10 | End: 2021-11-10

## 2021-11-10 RX ORDER — GLYCOPYRROLATE 0.2 MG/ML
0.2 INJECTION INTRAMUSCULAR; INTRAVENOUS
Status: COMPLETED | OUTPATIENT
Start: 2021-11-10 | End: 2021-11-10

## 2021-11-10 RX ADMIN — ROCURONIUM BROMIDE 20 MG: 10 INJECTION INTRAVENOUS at 09:07

## 2021-11-10 RX ADMIN — GLYCOPYRROLATE 0.2 MG: 0.2 INJECTION INTRAMUSCULAR; INTRAVENOUS at 07:14

## 2021-11-10 RX ADMIN — FENTANYL CITRATE 100 MCG: 50 INJECTION, SOLUTION INTRAMUSCULAR; INTRAVENOUS at 07:29

## 2021-11-10 RX ADMIN — ACETAMINOPHEN 1000 MG: 500 TABLET ORAL at 06:56

## 2021-11-10 RX ADMIN — ROCURONIUM BROMIDE 10 MG: 10 INJECTION INTRAVENOUS at 08:39

## 2021-11-10 RX ADMIN — OXYCODONE HYDROCHLORIDE 5 MG: 5 TABLET ORAL at 10:13

## 2021-11-10 RX ADMIN — CEFAZOLIN SODIUM 2 G: 2 INJECTION, SOLUTION INTRAVENOUS at 07:27

## 2021-11-10 RX ADMIN — ROCURONIUM BROMIDE 20 MG: 10 INJECTION INTRAVENOUS at 08:07

## 2021-11-10 RX ADMIN — ROCURONIUM BROMIDE 50 MG: 10 INJECTION INTRAVENOUS at 07:29

## 2021-11-10 RX ADMIN — MEPERIDINE HYDROCHLORIDE 12.5 MG: 25 INJECTION INTRAMUSCULAR; INTRAVENOUS; SUBCUTANEOUS at 10:13

## 2021-11-10 RX ADMIN — PROPOFOL 200 MG: 10 INJECTION, EMULSION INTRAVENOUS at 07:29

## 2021-11-10 RX ADMIN — SODIUM CHLORIDE, POTASSIUM CHLORIDE, SODIUM LACTATE AND CALCIUM CHLORIDE 30 ML/HR: 600; 310; 30; 20 INJECTION, SOLUTION INTRAVENOUS at 06:58

## 2021-11-10 RX ADMIN — SODIUM CHLORIDE, POTASSIUM CHLORIDE, SODIUM LACTATE AND CALCIUM CHLORIDE: 600; 310; 30; 20 INJECTION, SOLUTION INTRAVENOUS at 09:14

## 2021-11-10 RX ADMIN — SUGAMMADEX 200 MG: 100 INJECTION, SOLUTION INTRAVENOUS at 09:33

## 2021-11-10 RX ADMIN — MIDAZOLAM HYDROCHLORIDE 2 MG: 1 INJECTION, SOLUTION INTRAMUSCULAR; INTRAVENOUS at 07:14

## 2021-11-10 RX ADMIN — HYDROMORPHONE HYDROCHLORIDE 0.5 MG: 1 INJECTION, SOLUTION INTRAMUSCULAR; INTRAVENOUS; SUBCUTANEOUS at 09:50

## 2021-11-10 RX ADMIN — ONDANSETRON 4 MG: 2 INJECTION INTRAMUSCULAR; INTRAVENOUS at 07:40

## 2021-11-10 RX ADMIN — LIDOCAINE HYDROCHLORIDE 50 MG: 20 INJECTION, SOLUTION INFILTRATION; PERINEURAL at 07:29

## 2021-11-10 NOTE — H&P
[facility]   HISTORY AND PHYSICAL    Patient Name: Mercy Dalton  : 1970  MRN: [id number]  Primary Care Physician:  Adela Perkins APRN  Date of admission: 11/10/2021    Subjective   Subjective     Chief Complaint: Lumbar spinal stenosis with bilateral leg pain    51-year-old female with lumbar 3-lumbar 4 and lumbar 4-lumbar 5 spinal stenosis.  She had failed conservative interventions and opted for decompression.      Review of Systems   Musculoskeletal: Positive for back pain (bilateral leg pain).        Personal History     Past Medical History:   Diagnosis Date   • Allergic rhinitis    • Anemia     TAKES SUPPLEMENTS. DENIES ANY CURRENT ISSUES   • Exercise-induced asthma    • HGSIL (high grade squamous intraepithelial lesion) on Pap smear of cervix    • Hypothyroid    • Lumbar stenosis    • Neurogenic claudication (HCC)    • RLS (restless legs syndrome) 2018    REPORTS THAT ON GABAPENTIN AND THAT HAS HELPED   • Vertigo        Past Surgical History:   Procedure Laterality Date   • COLONOSCOPY  2020    POLYPS, INTERNAL HEMORRHOIDS   • DIAGNOSTIC LAPAROSCOPY     • ECTOPIC PREGNANCY  ,    REMOVAL    • ENDOMETRIAL ABLATION W/ NOVASURE     • LASER ABLATION      ABNORMAL PERIODS/ DR. PRAJAPATI   • LEEP N/A 4/3/2017    Procedure: LOOP ELECTROCAUTERY EXCISION PROCEDURE;  Surgeon: Oly Rivera MD;  [place] [facility] [place] [facility] [place]:    • OTHER SURGICAL HISTORY Left     PARTIAL AMPUTATION OF LEFT INDEX FINGER       Family History: family history includes Diabetes in her father; Liver disease in her mother; Skin cancer in her maternal grandmother and another family member; Stroke in an other family member. Otherwise pertinent FHx was reviewed and not pertinent to current issue.    Social History:  reports that she has never smoked. She has never used smokeless tobacco. She reports current alcohol use. She reports that she does not use drugs.    Home Medications:  Vitamin D,  ascorbic acid, calcium carbonate, gabapentin, levothyroxine, and vitamin B-12    Allergies:  Allergies   Allergen Reactions   • Bacitracin Rash   • Codeine Rash   • Corticosteroids Rash   • Flu Virus Vaccine Rash   • Formaldehyde Rash   • Gold-Containing Drug Products Rash   • Latex Rash   • Neomycin Rash   • Polymyxin B Rash   • Polymyxin B-Trimethoprim Rash   • Prednisone Rash   • Septra [Sulfamethoxazole-Trimethoprim] Rash       Objective    Objective     Vitals:   Temp:  [99.3 °F (37.4 °C)] 99.3 °F (37.4 °C)  Heart Rate:  [86] 86  Resp:  [16] 16  BP: (122)/(77) 122/77    Physical Exam  Constitutional:       Appearance: She is normal weight.   Cardiovascular:      Comments: No edema  Pulmonary:      Effort: Pulmonary effort is normal.   Neurological:      Mental Status: She is alert.   Psychiatric:         Mood and Affect: Mood normal.         Assessment/Plan   Assessment / Plan     Brief Patient Summary:  Mercy Dalton is a 51 y.o. female who has lumbar 3-lumbar 4 and lumbar 4-lumbar 5 stenosis.    Active Hospital Problems:  Active Hospital Problems    Diagnosis    • **Spinal stenosis, lumbar region, with neurogenic claudication      Added automatically from request for surgery [id number]       Plan:   OR today for left approach, lumbar 3-lumbar 4 and lumbar 4-lumbar 5 minimally invasive laminectomy.  Risk and benefits discussed with patient and she would like to proceed.    DVT prophylaxis:  Mechanical DVT prophylaxis orders are present.    CODE STATUS:       Admission Status:  I believe this patient meets outpatient status.    Electronically signed by David Duran MD, 11/10/21, 7:16 AM EST.

## 2021-11-10 NOTE — ANESTHESIA PREPROCEDURE EVALUATION
Anesthesia Evaluation     Patient summary reviewed and Nursing notes reviewed                Airway   Mallampati: I  TM distance: >3 FB  Neck ROM: full  No difficulty expected  Dental      Pulmonary - normal exam    breath sounds clear to auscultation  (+) asthma,  Cardiovascular - negative cardio ROS and normal exam    Rhythm: regular        Neuro/Psych  (+) dizziness/light headedness,     GI/Hepatic/Renal/Endo - negative ROS     Musculoskeletal (-) negative ROS    Abdominal    Substance History - negative use     OB/GYN negative ob/gyn ROS         Other                        Anesthesia Plan    ASA 2     general     intravenous induction     Anesthetic plan, all risks, benefits, and alternatives have been provided, discussed and informed consent has been obtained with: patient.

## 2021-11-10 NOTE — OP NOTE
LUMBAR LAMINECTOMY DISCECTOMY  Procedure Report    Patient Name:  Mercy Dalton  YOB: 1970    Date of Surgery:  11/10/2021     Indications: Lumbar spinal stenosis with neurogenic claudication    Pre-op Diagnosis:   Spinal stenosis, lumbar region, with neurogenic claudication [M48.062]       Post-Op Diagnosis Codes:     * Spinal stenosis, lumbar region, with neurogenic claudication [M48.062]    Procedure/CPT® Codes:      Procedure(s):  MINIMALLY INVASIVE LUMBAR LAMINECTOMY, LEFT APPROACH, LUMBAR 3-LUMBAR 4, LUMBAR 4-LUMBAR 5    Staff:  Surgeon(s):  David Duran MD    Assistant: Eladia Serrano RN CSA    Anesthesia: General    Estimated Blood Loss: 30 mL      Specimen:          Lamina and ligament (none to pathology)        Findings: Lateral recess stenosis at L3-4 and L4-5    Complications: No intraoperative complications    Description of Procedure: After informed consent was obtained, the patient was brought to the operating room. After the induction of adequate general endotracheal anesthesia, they were place in the prone position on the Darrel frame. All pressure points were padded. The back was prepped and draped in the typical fashion. A timeout was performed. The midline was marked and a line roughly 2.5 cms off the midline to the left was drawn. The C-arm and a spinal needle were used to localize the L3-4 and L4-5 level. A 2 cm incision was then made and the Boss tubular retractor system was used to dilate the tissue docking at L3-4 and L4-5. The levels were confirmed with fluoroscopy. The microscope was brought in and used for the remainder of the case for improved magnification and illumination.  At L3-4 the lamina was cleared of soft tissue and removed to above the insertion of the ligamentum flavum. The ligament was resected inferiorly and laterally to the edge of the nerve root.  There was significant hypertrophy of the medial facet as well as the ligament which was undercut  decompressing to the lateral edge of the root.  After decompressing on the left the ligament was removed from its insertion at L4 in the tubular retractor was then tilted medially allowing for exposure of the patient's right lateral recess.  The dissector was used to dissect the ligament from the underlying dura and a combination of 2 and 3 mm Kerrison punches were used to undercut the medial facet and ligament on the right until a ball probe passed freely along the right L4 nerve root.  Hemostasis was assured using Gelfoam with thrombin, bone wax, and the bipolar electrocautery.  Once hemostasis was assured attention was then turned to the L4-5 level.  Again the lamina was cleared of soft tissue and a combination of 2 and 3 mm Kerrison punches were used to remove the lamina by the insertion of the ligament the ligament was resected inferiorly and medially over the spinal sac and laterally over the L5 nerve root.  After decompressing to the lateral edge of the L5 nerve root by undercutting the overgrown ligament and facet a ball probe was able to freely pass along its course.  The ligament was then removed from its insertion L5 and the tubular tractor was tilted medially allowing for exposure of the patient's right lateral recess the ligament was dissected from the underlying dura using the dissector and a combination of 2 and 3 mm Kerrison punch were then used to undercut the medial facet and ligament decompressing the right L5 nerve root until a ball probe passed freely along its course.  Hemostasis was then obtained using bone wax, Gelfoam and thrombin as well as the bipolar cautery.  The wound was irrigated with normal saline.  The tubular retractor was removed at each level and hemostasis assured in the soft tissue. The fascia was reapproximated using a 0 Vicryl and the the skin edges were reapproximated using a subcutaneous 2-0 Vicryl. The wound was dressed with mastisol, steri-strips, Telfa and Tegaderm. All  sponge and needle counts were correct. The patient received a dose of preoperative antibiotics.     Assistant: Eladia Serrano RN CSA  was responsible for performing the following activities: Retraction, Suction, Irrigation, Closing and Placing Dressing and their skilled assistance was necessary for the success of this case.    David Duran MD     Date: 11/10/2021  Time: 09:40 EST

## 2021-11-10 NOTE — ANESTHESIA POSTPROCEDURE EVALUATION
Patient: Mercy Dalton    Procedure Summary     Date: 11/10/21 Room / Location: [facility] OR 05 / [facility] MAIN OR    Anesthesia Start: 0725 Anesthesia Stop: 0941    Procedure: MINIMALLY INVASIVE LUMBAR LAMINECTOMY, LEFT APPROACH, LUMBAR 3-LUMBAR 4, LUMBAR 4-LUMBAR 5 (Left ) Diagnosis:       Spinal stenosis, lumbar region, with neurogenic claudication      (Spinal stenosis, lumbar region, with neurogenic claudication [M48.062])    Surgeons: David Duran MD Provider: Baldomero Candelario MD    Anesthesia Type: general ASA Status: 2          Anesthesia Type: general    Vitals  Vitals Value Taken Time   BP 95/57 11/10/21 1000   Temp 36.6 °C (97.8 °F) 11/10/21 0940   Pulse 66 11/10/21 1002   Resp 14 11/10/21 0955   SpO2 94 % 11/10/21 1002   Vitals shown include unvalidated device data.        Post Anesthesia Care and Evaluation    Patient location during evaluation: bedside  Patient participation: complete - patient participated  Level of consciousness: awake  Pain management: adequate  Airway patency: patent  Anesthetic complications: No anesthetic complications  PONV Status: none  Cardiovascular status: acceptable  Respiratory status: acceptable  Hydration status: acceptable    Comments: An Anesthesiologist personally participated in the most demanding procedures (including induction and emergence if applicable) in the anesthesia plan, monitored the course of anesthesia administration at frequent intervals and remained physically present and available for immediate diagnosis and treatment of emergencies.

## 2021-11-11 RX ORDER — ONDANSETRON 4 MG/1
4 TABLET, FILM COATED ORAL EVERY 8 HOURS PRN
Qty: 20 TABLET | Refills: 0 | Status: SHIPPED | OUTPATIENT
Start: 2021-11-11 | End: 2022-03-23

## 2021-11-11 RX ORDER — ONDANSETRON 4 MG/1
4 TABLET, FILM COATED ORAL EVERY 8 HOURS PRN
Qty: 20 TABLET | Refills: 0 | Status: SHIPPED | OUTPATIENT
Start: 2021-11-11 | End: 2021-11-11 | Stop reason: SDUPTHER

## 2021-11-16 ENCOUNTER — TREATMENT (OUTPATIENT)
Dept: PHYSICAL THERAPY | Facility: CLINIC | Age: 51
End: 2021-11-16

## 2021-11-16 DIAGNOSIS — R42 DIZZINESS: ICD-10-CM

## 2021-11-16 DIAGNOSIS — R26.89 IMBALANCE: Primary | ICD-10-CM

## 2021-11-16 PROCEDURE — 97112 NEUROMUSCULAR REEDUCATION: CPT | Performed by: PHYSICAL THERAPIST

## 2021-11-16 NOTE — PROGRESS NOTES
Physical Therapy Daily Progress Note    VISIT#: 4    Subjective   Mercy Dalton reports her back surgery went well but she took a couple of days to stop vomiting from anesthesia.  She wonders if she took longer to recover because of the vertigo.      Objective     See Exercise, Manual, and Modality Logs for complete treatment.       Assessment/Plan  Pt has more symptom exacerbation with head/eye movements together than she does with VOR horizontal/vertical.  Exercises were limited today due to restrictions/pain from back surgery.  Continue with plan of care.  Pt was instructed to add head turns into her HEP.    Progress per Plan of Care            Timed:         Manual Therapy:         mins  80123;     Therapeutic Exercise:         mins  73775;     Neuromuscular Waldo:    24    mins  58388;    Therapeutic Activity:          mins  25053;     Gait Training:           mins  07552;     Ultrasound:          mins  88135;    Ionto                                   mins   01391  Self Care                            mins   26423  Canalith Repos                   mins  70909    Un-Timed:  Electrical Stimulation:         mins  07340 ( );  Dry Needling          mins self-pay  Traction          mins 71942  Low Eval          Mins  62728  Mod Eval          Mins  44806  High Eval                            Mins  20392  Re-Eval                               mins  06094    Timed Treatment:   24   mins   Total Treatment:     24   mins    Latoya Rea PT  Physical Therapist  [place] License: [id number]

## 2021-11-17 ENCOUNTER — TRANSCRIBE ORDERS (OUTPATIENT)
Dept: PHYSICAL THERAPY | Facility: CLINIC | Age: 51
End: 2021-11-17

## 2021-11-17 DIAGNOSIS — S68.611D: Primary | ICD-10-CM

## 2021-11-18 ENCOUNTER — TREATMENT (OUTPATIENT)
Dept: PHYSICAL THERAPY | Facility: CLINIC | Age: 51
End: 2021-11-18

## 2021-11-18 DIAGNOSIS — R26.89 IMBALANCE: ICD-10-CM

## 2021-11-18 DIAGNOSIS — R42 DIZZINESS: Primary | ICD-10-CM

## 2021-11-18 PROCEDURE — 97112 NEUROMUSCULAR REEDUCATION: CPT | Performed by: PHYSICAL THERAPIST

## 2021-11-18 NOTE — PROGRESS NOTES
"Physical Therapy Daily Progress Note    VISIT#: 5    Subjective   Mercy Dalton reports she hasn't had any dizziness with normal daily activities.  She reports some dizziness with head turning exercise at home.    Objective     See Exercise, Manual, and Modality Logs for complete treatment.       Assessment/Plan  Pt continues to experience \"pressure\" and imbalance with exercises although it has improved since previous session.  Continuing to use caution when performing exercises due to recent low back surgery.  Continue with plan of care.     Progress per Plan of Care            Timed:         Manual Therapy:         mins  01010;     Therapeutic Exercise:         mins  79155;     Neuromuscular Waldo:    27    mins  15008;    Therapeutic Activity:          mins  05022;     Gait Training:           mins  21583;     Ultrasound:          mins  22652;    Ionto                                   mins   73689  Self Care                            mins   39126  Canalith Repos                   mins  25696    Un-Timed:  Electrical Stimulation:         mins  50870 ( );  Dry Needling          mins self-pay  Traction          mins 71507  Low Eval          Mins  90049  Mod Eval          Mins  68579  High Eval                            Mins  90771  Re-Eval                               mins  37995    Timed Treatment:   27   mins   Total Treatment:     27   mins    Latoya Rea PT  Physical Therapist  [place] License: [id number]  "

## 2021-11-23 ENCOUNTER — TREATMENT (OUTPATIENT)
Dept: PHYSICAL THERAPY | Facility: CLINIC | Age: 51
End: 2021-11-23

## 2021-11-23 DIAGNOSIS — R42 DIZZINESS: ICD-10-CM

## 2021-11-23 DIAGNOSIS — R26.89 IMBALANCE: Primary | ICD-10-CM

## 2021-11-23 PROCEDURE — PTNOCHG PR CUSTOM PT NO CHARGE VISIT: Performed by: PHYSICAL THERAPIST

## 2021-11-23 NOTE — PROGRESS NOTES
Discharge Summary  Discharge Summary from Physical Therapy Report    Patient Information  Mercy Dalton  1970    Pt reports she has not had any dizziness while performing her ADLs in the past week.  She states the only time she has had any symptoms is after performing her exercises.  She states she feels ready for discharge from PT and will continue with her HEP. She reports a 90% improvement in her overall symptoms.    Goals: Partially Met    Visit Diagnoses:    ICD-10-CM ICD-9-CM   1. Imbalance  R26.89 781.2   2. Dizziness  R42 780.4       Goals  Plan Goals: 1. Mobility: Walking/Moving Around Functional Limitation                       LTG 1: 12 weeks:  The patient will demonstrate 6% limitation by achieving a score of 6 on the Dizziness Handicap Inventory.   STATUS:  progressing  STG 1a: 6 weeks:  The patient will demonstrate 12% limitation by achieving a score of 12 on the Dizziness Handicap Inventory.     STATUS:  met     2. The patient has dizziness.   LTG 2: 12 weeks: The patient will report a 75% improvement in her dizziness in order to improve tolerance to functional activities, such as walking.  STATUS: progressing  STG 2: 6 weeks: The patient will report a 50% improvement in her dizziness in order to improve tolerance to functional activities, such as walking.  STATUS: progressing    Pt has had a good improvement in her score on the DHI since starting therapy.  Her dizziness and head pressure have improved with ADLs.  She has no issues with balance.  Pt will be discharged from PT.    Discharge Plan: Continue with current home exercise program as instructed    Date of Discharge 11/23/21        Latoya Rea PT  Physical Therapist  [place] License: [id number]

## 2021-11-29 ENCOUNTER — TREATMENT (OUTPATIENT)
Dept: PHYSICAL THERAPY | Facility: CLINIC | Age: 51
End: 2021-11-29

## 2021-11-29 DIAGNOSIS — S68.111A AMPUTATION OF LEFT INDEX FINGER: Primary | ICD-10-CM

## 2021-11-29 DIAGNOSIS — M79.645 PAIN OF FINGER OF LEFT HAND: ICD-10-CM

## 2021-11-29 DIAGNOSIS — M25.642 STIFFNESS OF FINGER JOINT OF LEFT HAND: ICD-10-CM

## 2021-11-29 DIAGNOSIS — S68.611D: ICD-10-CM

## 2021-11-29 DIAGNOSIS — S61.452A DOG BITE OF LEFT HAND, INITIAL ENCOUNTER: ICD-10-CM
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
## 2021-11-29 PROCEDURE — 97140 MANUAL THERAPY 1/> REGIONS: CPT | Performed by: OCCUPATIONAL THERAPIST

## 2021-11-29 PROCEDURE — 97112 NEUROMUSCULAR REEDUCATION: CPT | Performed by: OCCUPATIONAL THERAPIST

## 2021-11-29 PROCEDURE — 97166 OT EVAL MOD COMPLEX 45 MIN: CPT | Performed by: OCCUPATIONAL THERAPIST

## 2021-11-29 PROCEDURE — 97110 THERAPEUTIC EXERCISES: CPT | Performed by: OCCUPATIONAL THERAPIST

## 2021-11-29 NOTE — PROGRESS NOTES
Outpatient Occupational Therapy Ortho Initial Evaluation    Patient: Mercy Dalton   : 1970  Diagnosis/ICD-10 Code:  Amputation of left index finger [S68.111A]  Referring practitioner: Leny Jerry MD  Date of Initial Visit: 2021  Today's Date: 2021  Patient seen for 1 sessions               Subjective Questionnaire: QuickDASH: 30      Subjective Evaluation    History of Present Illness  Date of onset: 10/11/2021  Mechanism of injury: 10/11/21 Dog Bite, stitched minor cuts in ER and referred to [facility].  Amputation of DIP of L IF. Pt is R hand dominant.  Having stiffness and pain in L IF and L LF.  Having PIP extension lag, numbness and tingling,     Quality of life: excellent    Pain  Current pain ratin  At worst pain ratin  Location: IF and LF  Aggravating factors: keyboarding (pouring pans, sensitivty, putting in earrings.)  Progression: improved    Social Support  Lives in: multiple-level home  Lives with: spouse    Hand dominance: right    Patient Goals  Patient goals for therapy: decreased pain, independence with ADLs/IADLs, return to work, increased motion and decreased edema  Patient goal: typing; use the finger like I used to.          Past Medical hx: no significant medical hx    Objective          Neurological Testing     Sensation     Wrist/Hand     Right   Diminished: light touch    Comments   Right light touch: tip of IF 3.61; IF and LF 2.83    Active Range of Motion     Additional Active Range of Motion Details  0-50 0-15 amputated  0-70 20-95 0-55    Strength/Myotome Testing     Left Wrist/Hand      (2nd hand position)   lbs: 10    Right Wrist/Hand      (2nd hand position)   lbs: 40    Swelling     Left Wrist/Hand   Index     Middle: 5.8 cm          Assessment & Plan     Assessment  Impairments: abnormal coordination, abnormal muscle firing, abnormal or restricted ROM, activity intolerance, impaired physical strength, lacks appropriate home  exercise program, pain with function, safety issue and weight-bearing intolerance  Functional Limitations: carrying objects, lifting, pulling, pushing, reaching behind back, reaching overhead and unable to perform repetitive tasksPrognosis: good    Goals  Plan Goals: 1. The patient complains of pain in the L hand                  LTG 1: 12 weeks:  The patient will report a pain rating of 0/10 or better in order to improve sleep quality and tolerance to performance of activities of daily living.                                  STATUS:  New                  STG 1a: 4weeks:  The patient will report a pain rating of 1/10 or better.                                   STATUS:  New  2. The patient has limited ROM of the L IF and LF                  LTG 2: 12 weeks:  The patient will demonstrate 200 degrees of L IF and 240 LF to allow the patient to .                                  STATUS:  New                   STG 2a: 4 weeks:  The patient will demonstrate 160 degrees of RIOS of IF and LF.                                  STATUS:  New                             3. The patient has limited strength of the L UE.                  LTG 3: 12 weeks:  The patient will demonstrate 40# in order to return to PLOF.                                  STATUS:  New                  STG 3a: 4 weeks:  The patient will demonstrate tolerance to light strengthening without adverse reaction.                                  STATUS:  New  4. Carrying, Moving, and Handling Objects Functional Limitation                                   LTG 4: 12 weeks:  The patient will demonstrate 0% limitation by achieving a score of 11 on the Quick DASH.                                  STATUS:  New                  STG 4a: 4 weeks:  The patient will demonstrate 20-39% limitation by achieving a score of 25 on the Quick DASH.                                    STATUS:  New                    TREATMENT: Orthotic fabrication/fitting/management and training,  Manual therapy, therapeutic exercise, home exercise instruction, and modalities as needed to include: electrical stimulation, ultrasound, moist heat, paraffin, fluidotherapy and ice.      Plan  Planned modality interventions: TENS, thermotherapy (hydrocollator packs), thermotherapy (paraffin bath), ultrasound and electrical stimulation/Russian stimulation  Other planned modality interventions: fluidotherapy  Planned therapy interventions: manual therapy, ADL retraining, motor coordination training, neuromuscular re-education, soft tissue mobilization, fine motor coordination training, body mechanics training, balance/weight-bearing training, functional ROM exercises, flexibility, spinal/joint mobilization, strengthening, stretching, therapeutic activities, IADL retraining, joint mobilization and home exercise program  Frequency: 2x week  Duration in weeks: 12  Treatment plan discussed with: patient        Patient is indicated for skilled occupational therapy services.    History # of Personal Factors and/or Comorbidities: MODERATE (1-2)  Examination of Body System(s): # of elements: MODERATE (3)  Clinical Presentation: STABLE   Clinical Decision Making: MODERATE     Evaluation:  Low Complexity:    0     mins  30526;  Mod Complexity:    30     mins  48502;  High Complexity:    0     mins  78457;    Timed:  Manual Therapy:    10     mins  06737;  Therapeutic Exercise:    10     mins  54270;  Therapeutic Activity:    0     mins  02242;     Neuromuscular Waldo:   10    mins  42581;    Ultrasound:     0     mins  65475;    Electrical Stimulation:    0     mins  57171;    Untimed:  Electrical Stimulation:    0     mins  00166 ( );  Fluidotherapy:        0    mins  46760  Paraffin:                          0    mins  24796    Timed Treatment:   30   mins   Total Treatment:     60   mins      OT SIGNATURE: RONNIE Foster, OTR/L, CHT     Electronically signed    [place] LICENSE: [id number]   DATE TREATMENT INITIATED:  11/29/2021    Initial Certification  Certification Period: 11/29/2021 thru 2/26/2022  I certify that the therapy services are furnished while this patient is under my care.  The services outlined above are required by this patient, and will be reviewed every 90 days.     PHYSICIAN: Leny Jerry MD      DATE:     Please sign and return via fax to [phone]   Thank you, [facility]ational Therapy.

## 2021-12-01 ENCOUNTER — TREATMENT (OUTPATIENT)
Dept: PHYSICAL THERAPY | Facility: CLINIC | Age: 51
End: 2021-12-01

## 2021-12-01 DIAGNOSIS — M79.645 PAIN OF FINGER OF LEFT HAND: ICD-10-CM

## 2021-12-01 DIAGNOSIS — S68.111A AMPUTATION OF LEFT INDEX FINGER: Primary | ICD-10-CM

## 2021-12-01 DIAGNOSIS — M25.642 STIFFNESS OF FINGER JOINT OF LEFT HAND: ICD-10-CM

## 2021-12-01 DIAGNOSIS — S61.452A DOG BITE OF LEFT HAND, INITIAL ENCOUNTER: ICD-10-CM
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
## 2021-12-01 PROCEDURE — 97530 THERAPEUTIC ACTIVITIES: CPT | Performed by: OCCUPATIONAL THERAPIST

## 2021-12-01 PROCEDURE — 97110 THERAPEUTIC EXERCISES: CPT | Performed by: OCCUPATIONAL THERAPIST

## 2021-12-01 PROCEDURE — 97014 ELECTRIC STIMULATION THERAPY: CPT | Performed by: OCCUPATIONAL THERAPIST

## 2021-12-01 PROCEDURE — 97140 MANUAL THERAPY 1/> REGIONS: CPT | Performed by: OCCUPATIONAL THERAPIST

## 2021-12-01 NOTE — PROGRESS NOTES
Occupational Therapy Daily Treatment Note      Patient: Mercy Dalton   : 1970  Referring practitioner: No ref. provider found  Date of Initial Visit: Type: THERAPY  Noted: 2021  Today's Date: 2021  Patient seen for 2 sessions    ICD-10-CM ICD-9-CM   1. Amputation of left index finger  S68.111A 886.0   2. Dog bite of left hand, initial encounter  S61.452A 882.0    W54.0XXA E906.0   3. Pain of finger of left hand  M79.645 729.5   4. Stiffness of finger joint of left hand  M25.642 719.54          Mercy Dalton reports wearing the silicone caps at night and getting more tolerant of touch on both IF and LF.      Objective   See Exercise, Manual, and Modality Logs for complete treatment.   LF is able to touch palm today and PIP is 5 degrees from fuill    Assessment/Plan    Pt continues to have extensor habitus in L IF, but can pull it down more when encouraged to move that finger.     Cont per POC           Timed:  Manual Therapy:    10     mins  31519;  Therapeutic Exercise:    10     mins  02422;  Therapeutic Activity:    10     mins  90953;     Neuromuscular Waldo:    0    mins  92955;    Ultrasound:     0     mins  86845;    Electrical Stimulation:    0     mins  90922;    Untimed:  Electrical Stimulation:    10     mins  76308 ( );  Fluidotherapy:        0    mins  39141  Paraffin:                          0    mins  02937    Timed Treatment:   40   mins   Total Treatment:     40   mins    OT SIGNATURE: RONNIE Foster, OTR/L, CHT     Electronically signed    [place] LICENSE: [id number]

## 2021-12-02 ENCOUNTER — OFFICE VISIT (OUTPATIENT)
Dept: NEUROSURGERY | Facility: CLINIC | Age: 51
End: 2021-12-02

## 2021-12-02 VITALS
DIASTOLIC BLOOD PRESSURE: 62 MMHG | SYSTOLIC BLOOD PRESSURE: 96 MMHG | WEIGHT: 154.8 LBS | HEIGHT: 68 IN | BODY MASS INDEX: 23.46 KG/M2

## 2021-12-02 DIAGNOSIS — M48.062 SPINAL STENOSIS, LUMBAR REGION, WITH NEUROGENIC CLAUDICATION: Primary | ICD-10-CM

## 2021-12-02 PROCEDURE — 99024 POSTOP FOLLOW-UP VISIT: CPT | Performed by: NEUROLOGICAL SURGERY

## 2021-12-02 NOTE — PROGRESS NOTES
Mercy Dalton is a 51 y.o. female that presents with Post-op       She is overall improved, including her cramps. If sitting for a period of time she gets some pain with movement.         hospitals       Assessment and Plan {CC Problem List  Visit Diagnosis  ROS  Review (Popup)  [facility] Maintenance  Quality  BestPractice  Medications  SmartSets  SnapShot Encounters  Media :23}   Problem List Items Addressed This Visit     None      Visit Diagnoses     Spinal stenosis, lumbar region, with neurogenic claudication    -  Primary        She will slowly increase activity as tolerated.     She will f/u with any worsened pain.   Follow Up {Instructions Charge Capture  Follow-up Communications :23}   No follow-ups on file.

## 2021-12-07 ENCOUNTER — TREATMENT (OUTPATIENT)
Dept: PHYSICAL THERAPY | Facility: CLINIC | Age: 51
End: 2021-12-07

## 2021-12-07 DIAGNOSIS — S61.452A DOG BITE OF LEFT HAND, INITIAL ENCOUNTER: ICD-10-CM
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
## 2021-12-07 DIAGNOSIS — M79.645 PAIN OF FINGER OF LEFT HAND: ICD-10-CM

## 2021-12-07 DIAGNOSIS — S68.111A AMPUTATION OF LEFT INDEX FINGER: Primary | ICD-10-CM

## 2021-12-07 DIAGNOSIS — M25.642 STIFFNESS OF FINGER JOINT OF LEFT HAND: ICD-10-CM

## 2021-12-07 PROCEDURE — 97110 THERAPEUTIC EXERCISES: CPT | Performed by: OCCUPATIONAL THERAPIST

## 2021-12-07 PROCEDURE — 97140 MANUAL THERAPY 1/> REGIONS: CPT | Performed by: OCCUPATIONAL THERAPIST

## 2021-12-07 PROCEDURE — 97112 NEUROMUSCULAR REEDUCATION: CPT | Performed by: OCCUPATIONAL THERAPIST

## 2021-12-07 NOTE — PROGRESS NOTES
Occupational Therapy Daily Treatment Note      Patient: Mercy Dalton   : 1970  Referring practitioner: Leny Jerry MD  Date of Initial Visit: Type: THERAPY  Noted: 2021  Today's Date: 2021  Patient seen for 3 sessions    ICD-10-CM ICD-9-CM   1. Amputation of left index finger  S68.111A 886.0   2. Dog bite of left hand, initial encounter  S61.452A 882.0    W54.0XXA E906.0   3. Pain of finger of left hand  M79.645 729.5   4. Stiffness of finger joint of left hand  M25.642 719.54          Mercy Dalton reports typing more with LF.  Noticing the IF is turning purple on the tip intermittently.        Objective   See Exercise, Manual, and Modality Logs for complete treatment.   Able to tolerate touching towel better this date with IF.  Pt is also able to tolerate deep touch to tip of IF without increased pain.    Assessment/Plan  Pt progressing with full fist with LF, MP has increased flexion with IF, continues to have PIP stiffness.       Cont per POC           Timed:  Manual Therapy:    10     mins  41314;  Therapeutic Exercise:    10     mins  65271;  Therapeutic Activity:    0     mins  79957;     Neuromuscular Waldo:    10    mins  52034;    Ultrasound:     0     mins  47563;    Electrical Stimulation:    0     mins  01129;    Untimed:  Electrical Stimulation:    0     mins  37795 ( );  Fluidotherapy:        0    mins  01603  Paraffin:                          0    mins  60765    Timed Treatment:   40   mins   Total Treatment:     40   mins    OT SIGNATURE: RONNIE Foster, OTR/L, CHT     Electronically signed    [place] LICENSE: [id number]

## 2021-12-09 ENCOUNTER — TREATMENT (OUTPATIENT)
Dept: PHYSICAL THERAPY | Facility: CLINIC | Age: 51
End: 2021-12-09

## 2021-12-09 DIAGNOSIS — S61.452A DOG BITE OF LEFT HAND, INITIAL ENCOUNTER: ICD-10-CM

## 2021-12-09 DIAGNOSIS — M79.645 PAIN OF FINGER OF LEFT HAND: ICD-10-CM

## 2021-12-09 DIAGNOSIS — S68.111A AMPUTATION OF LEFT INDEX FINGER: Primary | ICD-10-CM
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
## 2021-12-09 DIAGNOSIS — M25.642 STIFFNESS OF FINGER JOINT OF LEFT HAND: ICD-10-CM

## 2021-12-09 PROCEDURE — 97140 MANUAL THERAPY 1/> REGIONS: CPT | Performed by: OCCUPATIONAL THERAPIST

## 2021-12-09 PROCEDURE — 97110 THERAPEUTIC EXERCISES: CPT | Performed by: OCCUPATIONAL THERAPIST

## 2021-12-09 PROCEDURE — 97112 NEUROMUSCULAR REEDUCATION: CPT | Performed by: OCCUPATIONAL THERAPIST

## 2021-12-09 PROCEDURE — 97530 THERAPEUTIC ACTIVITIES: CPT | Performed by: OCCUPATIONAL THERAPIST

## 2021-12-09 NOTE — PROGRESS NOTES
Occupational Therapy Daily Treatment Note      Patient: Mercy Dalton   : 1970  Referring practitioner: Leny Jerry MD  Date of Initial Visit: Type: THERAPY  Noted: 2021  Today's Date: 2021  Patient seen for 4 sessions    ICD-10-CM ICD-9-CM   1. Amputation of left index finger  S68.111A 886.0   2. Dog bite of left hand, initial encounter  S61.452A 882.0    W54.0XXA E906.0   3. Pain of finger of left hand  M79.645 729.5   4. Stiffness of finger joint of left hand  M25.642 719.54          Mercy Dalton reports I have noticed the tip turns purple in both warmth and cold. But the sensitivity is improving.      Objective   See Exercise, Manual, and Modality Logs for complete treatment.   IF 0-85   0-45  Amp  LF 0-95  0-105 0-65    Assessment/Plan  Pt is tolerating increased touch at tip.      Cont per POC           Timed:  Manual Therapy:    10     mins  10218;  Therapeutic Exercise:    10     mins  62955;  Therapeutic Activity:    10     mins  54212;     Neuromuscular Waldo:    10    mins  91249;    Ultrasound:     0     mins  36708;    Electrical Stimulation:    0     mins  50361;    Untimed:  Electrical Stimulation:    0     mins  52851 ( );  Fluidotherapy:        0    mins  86745  Paraffin:                          0    mins  36616    Timed Treatment:   40   mins   Total Treatment:     40   mins    OT SIGNATURE: RONNIE Foster, OTR/L, CHT     Electronically signed    [place] LICENSE: [id number]

## 2021-12-14 ENCOUNTER — TREATMENT (OUTPATIENT)
Dept: PHYSICAL THERAPY | Facility: CLINIC | Age: 51
End: 2021-12-14

## 2021-12-14 DIAGNOSIS — M79.645 PAIN OF FINGER OF LEFT HAND: ICD-10-CM

## 2021-12-14 DIAGNOSIS — S61.452A DOG BITE OF LEFT HAND, INITIAL ENCOUNTER: ICD-10-CM

## 2021-12-14 DIAGNOSIS — S68.111A AMPUTATION OF LEFT INDEX FINGER: Primary | ICD-10-CM

## 2021-12-14 DIAGNOSIS — M25.642 STIFFNESS OF FINGER JOINT OF LEFT HAND: ICD-10-CM
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
## 2021-12-14 PROCEDURE — 97022 WHIRLPOOL THERAPY: CPT | Performed by: OCCUPATIONAL THERAPIST

## 2021-12-14 PROCEDURE — 97140 MANUAL THERAPY 1/> REGIONS: CPT | Performed by: OCCUPATIONAL THERAPIST

## 2021-12-14 PROCEDURE — 97110 THERAPEUTIC EXERCISES: CPT | Performed by: OCCUPATIONAL THERAPIST

## 2021-12-14 PROCEDURE — 97112 NEUROMUSCULAR REEDUCATION: CPT | Performed by: OCCUPATIONAL THERAPIST

## 2021-12-14 NOTE — PROGRESS NOTES
"Occupational Therapy Daily Treatment Note      Patient: Mercy Dalton   : 1970  Referring practitioner: Leny Jerry MD  Date of Initial Visit: Type: THERAPY  Noted: 2021  Today's Date: 2021  Patient seen for 5 sessions    ICD-10-CM ICD-9-CM   1. Amputation of left index finger  S68.111A 886.0   2. Dog bite of left hand, initial encounter  S61.452A 882.0    W54.0XXA E906.0   3. Pain of finger of left hand  M79.645 729.5   4. Stiffness of finger joint of left hand  M25.642 719.54          Mercy Dalton reports the doctor was pleased with my progress.  Wants me to continue with therapy.       Objective   See Exercise, Manual, and Modality Logs for complete treatment.   Long finger has increased to full functional use, still has sensitivity and numbness and tingling in the tip.     Assessment/Plan  Pt progressing with ability to be out of coban and covering on IF at home.  Continues to have \"skin crawling\" feeling on tip of finger with any pressure.     Cont per POC           Timed:  Manual Therapy:    10     mins  45263;  Therapeutic Exercise:    10     mins  71038;  Therapeutic Activity:    0     mins  84988;     Neuromuscular Waldo:    10    mins  51562;    Ultrasound:     0     mins  67195;    Electrical Stimulation:    0     mins  38431;    Untimed:  Electrical Stimulation:    0     mins  44914 ( );  Fluidotherapy:        10    mins  12292  Paraffin:                          0    mins  54999    Timed Treatment:   30   mins   Total Treatment:     40   mins    OT SIGNATURE: RONNIE Foster, OTR/L, CHT     Electronically signed    [place] LICENSE: [id number]     "

## 2021-12-16 ENCOUNTER — TREATMENT (OUTPATIENT)
Dept: PHYSICAL THERAPY | Facility: CLINIC | Age: 51
End: 2021-12-16

## 2021-12-16 DIAGNOSIS — S61.452A DOG BITE OF LEFT HAND, INITIAL ENCOUNTER: ICD-10-CM
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
## 2021-12-16 DIAGNOSIS — M79.645 PAIN OF FINGER OF LEFT HAND: ICD-10-CM

## 2021-12-16 DIAGNOSIS — S68.111A AMPUTATION OF LEFT INDEX FINGER: Primary | ICD-10-CM

## 2021-12-16 DIAGNOSIS — M25.642 STIFFNESS OF FINGER JOINT OF LEFT HAND: ICD-10-CM

## 2021-12-16 PROCEDURE — 97110 THERAPEUTIC EXERCISES: CPT | Performed by: OCCUPATIONAL THERAPIST

## 2021-12-16 PROCEDURE — 97022 WHIRLPOOL THERAPY: CPT | Performed by: OCCUPATIONAL THERAPIST

## 2021-12-16 PROCEDURE — 97140 MANUAL THERAPY 1/> REGIONS: CPT | Performed by: OCCUPATIONAL THERAPIST

## 2021-12-16 PROCEDURE — 97112 NEUROMUSCULAR REEDUCATION: CPT | Performed by: OCCUPATIONAL THERAPIST

## 2021-12-16 NOTE — PROGRESS NOTES
Occupational Therapy Daily Treatment Note      Patient: Mercy Dalton   : 1970  Referring practitioner: Leny Jerry MD  Date of Initial Visit: Type: THERAPY  Noted: 2021  Today's Date: 2021  Patient seen for 6 sessions    ICD-10-CM ICD-9-CM   1. Amputation of left index finger  S68.111A 886.0   2. Dog bite of left hand, initial encounter  S61.452A 882.0    W54.0XXA E906.0   3. Pain of finger of left hand  M79.645 729.5   4. Stiffness of finger joint of left hand  M25.642 719.54          Mercy Dalton reports I can actually touch the tip of my finger           Objective   See Exercise, Manual, and Modality Logs for complete treatment.   IF   0-75     0-40     amputated    MF  0-70     20-95   0-55    Assessment/Plan  Pt is progressing with desensitization and tip.  Has gained 50 degrees since eval.      Cont per POC           Timed:  Manual Therapy:    10     mins  45275;  Therapeutic Exercise:    10     mins  03056;  Therapeutic Activity:    0     mins  64670;     Neuromuscular Waldo:    10    mins  05826;    Ultrasound:     0     mins  06976;    Electrical Stimulation:    0     mins  87572;    Untimed:  Electrical Stimulation:    0     mins  65587 ( );  Fluidotherapy:        10    mins  81893  Paraffin:                          0    mins  04377    Timed Treatment:   30   mins   Total Treatment:     40   mins    OT SIGNATURE: RONNIE Foster, OTR/L, CHT     Electronically signed    [place] LICENSE: [id number]

## 2021-12-20 ENCOUNTER — TREATMENT (OUTPATIENT)
Dept: PHYSICAL THERAPY | Facility: CLINIC | Age: 51
End: 2021-12-20

## 2021-12-20 DIAGNOSIS — M79.645 PAIN OF FINGER OF LEFT HAND: ICD-10-CM

## 2021-12-20 DIAGNOSIS — S68.111A AMPUTATION OF LEFT INDEX FINGER: Primary | ICD-10-CM
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
## 2021-12-20 DIAGNOSIS — M25.642 STIFFNESS OF FINGER JOINT OF LEFT HAND: ICD-10-CM

## 2021-12-20 DIAGNOSIS — S61.452A DOG BITE OF LEFT HAND, INITIAL ENCOUNTER: ICD-10-CM

## 2021-12-20 PROCEDURE — 97530 THERAPEUTIC ACTIVITIES: CPT | Performed by: OCCUPATIONAL THERAPIST

## 2021-12-20 PROCEDURE — 97112 NEUROMUSCULAR REEDUCATION: CPT | Performed by: OCCUPATIONAL THERAPIST

## 2021-12-20 PROCEDURE — 97110 THERAPEUTIC EXERCISES: CPT | Performed by: OCCUPATIONAL THERAPIST

## 2021-12-20 PROCEDURE — 97022 WHIRLPOOL THERAPY: CPT | Performed by: OCCUPATIONAL THERAPIST

## 2021-12-20 NOTE — PROGRESS NOTES
Occupational Therapy Daily Treatment Note      Patient: Mercy Dalton   : 1970  Referring practitioner: Leny Jerry MD  Date of Initial Visit: Type: THERAPY  Noted: 2021  Today's Date: 2021  Patient seen for 7 sessions    ICD-10-CM ICD-9-CM   1. Amputation of left index finger  S68.111A 886.0   2. Dog bite of left hand, initial encounter  S61.452A 882.0    W54.0XXA E906.0   3. Pain of finger of left hand  M79.645 729.5   4. Stiffness of finger joint of left hand  M25.642 719.54          Mercy Dalton reports I am moving it more, but I am still having difficulty touching things during daily tasks. Pt reports she was putting sheets on the bed and hit her finger.       Objective   See Exercise, Manual, and Modality Logs for complete treatment.   Pt progressing with sensory tolerance to pushing, and pressure on tip of IF.    Assessment/Plan  Progressing with touch tip of P2 without skin crawling sensation.       Cont per POC           Timed:  Manual Therapy:    5     mins  70637;  Therapeutic Exercise:    9     mins  64887;  Therapeutic Activity:    8    mins  36453;     Neuromuscular Waldo:    8    mins  86633;    Ultrasound:     0     mins  12123;    Electrical Stimulation:    0     mins  53264;    Untimed:  Electrical Stimulation:    0     mins  76053 ( );  Fluidotherapy:        10    mins  66062  Paraffin:                          0    mins  85745    Timed Treatment:   30   mins   Total Treatment:     40   mins    OT SIGNATURE: RONNIE Foster, OTR/L, CHT     Electronically signed    [place] LICENSE: [id number]

## 2021-12-22 ENCOUNTER — TREATMENT (OUTPATIENT)
Dept: PHYSICAL THERAPY | Facility: CLINIC | Age: 51
End: 2021-12-22

## 2021-12-22 DIAGNOSIS — M25.642 STIFFNESS OF FINGER JOINT OF LEFT HAND: ICD-10-CM
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
## 2021-12-22 DIAGNOSIS — S61.452A DOG BITE OF LEFT HAND, INITIAL ENCOUNTER: ICD-10-CM

## 2021-12-22 DIAGNOSIS — M79.645 PAIN OF FINGER OF LEFT HAND: ICD-10-CM

## 2021-12-22 DIAGNOSIS — S68.111A AMPUTATION OF LEFT INDEX FINGER: Primary | ICD-10-CM

## 2021-12-22 PROCEDURE — 97022 WHIRLPOOL THERAPY: CPT | Performed by: OCCUPATIONAL THERAPIST

## 2021-12-22 PROCEDURE — 97110 THERAPEUTIC EXERCISES: CPT | Performed by: OCCUPATIONAL THERAPIST

## 2021-12-22 PROCEDURE — 97140 MANUAL THERAPY 1/> REGIONS: CPT | Performed by: OCCUPATIONAL THERAPIST

## 2021-12-22 PROCEDURE — 97112 NEUROMUSCULAR REEDUCATION: CPT | Performed by: OCCUPATIONAL THERAPIST

## 2021-12-22 NOTE — PROGRESS NOTES
Occupational Therapy Daily Treatment Note      Patient: Mercy Dalton   : 1970  Referring practitioner: Leny Jerry MD  Date of Initial Visit: Type: THERAPY  Noted: 2021  Today's Date: 2021  Patient seen for 8 sessions    ICD-10-CM ICD-9-CM   1. Amputation of left index finger  S68.111A 886.0   2. Dog bite of left hand, initial encounter  S61.452A 882.0    W54.0XXA E906.0   3. Pain of finger of left hand  M79.645 729.5   4. Stiffness of finger joint of left hand  M25.642 719.54          Mercy Dalton reports I have noticed I am tolerating pressure on the tip better.       Objective   See Exercise, Manual, and Modality Logs for complete treatment.   Having sensitivity this morning, after increased sensory stimulation last night.     Assessment/Plan  Pt progressing with strength and functional gripping, sensory tolerance. Pt tolerating increased different sensation including vibration today.  Discussed low load prolonged flexion strapping for HEP.      Cont per POC           Timed:  Manual Therapy:    10     mins  66426;  Therapeutic Exercise:    10     mins  86319;  Therapeutic Activity:    0     mins  43620;     Neuromuscular Waldo:    10    mins  62100;    Ultrasound:     0     mins  87747;    Electrical Stimulation:    0     mins  71146;    Untimed:  Electrical Stimulation:    0     mins  73395 ( );  Fluidotherapy:        10    mins  79245  Paraffin:                          0    mins  94249    Timed Treatment:   30   mins   Total Treatment:     40   mins    OT SIGNATURE: RONNIE Foster, OTR/L, CHT     Electronically signed    [place] LICENSE: [id number]

## 2022-01-03 ENCOUNTER — HOSPITAL ENCOUNTER (OUTPATIENT)
Dept: MAMMOGRAPHY | Facility: HOSPITAL | Age: 52
Discharge: HOME | End: 2022-01-03
Admitting: NURSE PRACTITIONER

## 2022-01-03 ENCOUNTER — TREATMENT (OUTPATIENT)
Dept: PHYSICAL THERAPY | Facility: CLINIC | Age: 52
End: 2022-01-03
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
## 2022-01-03 DIAGNOSIS — M25.642 STIFFNESS OF FINGER JOINT OF LEFT HAND: ICD-10-CM

## 2022-01-03 DIAGNOSIS — Z12.31 ENCOUNTER FOR SCREENING MAMMOGRAM FOR MALIGNANT NEOPLASM OF BREAST: ICD-10-CM

## 2022-01-03 DIAGNOSIS — S61.452A DOG BITE OF LEFT HAND, INITIAL ENCOUNTER: ICD-10-CM

## 2022-01-03 DIAGNOSIS — S68.111A AMPUTATION OF LEFT INDEX FINGER: Primary | ICD-10-CM

## 2022-01-03 DIAGNOSIS — M79.645 PAIN OF FINGER OF LEFT HAND: ICD-10-CM

## 2022-01-03 PROCEDURE — 97022 WHIRLPOOL THERAPY: CPT | Performed by: OCCUPATIONAL THERAPIST

## 2022-01-03 PROCEDURE — 77063 BREAST TOMOSYNTHESIS BI: CPT

## 2022-01-03 PROCEDURE — 97530 THERAPEUTIC ACTIVITIES: CPT | Performed by: OCCUPATIONAL THERAPIST

## 2022-01-03 PROCEDURE — 97110 THERAPEUTIC EXERCISES: CPT | Performed by: OCCUPATIONAL THERAPIST

## 2022-01-03 PROCEDURE — 97112 NEUROMUSCULAR REEDUCATION: CPT | Performed by: OCCUPATIONAL THERAPIST

## 2022-01-03 PROCEDURE — 77067 SCR MAMMO BI INCL CAD: CPT

## 2022-01-03 PROCEDURE — 97140 MANUAL THERAPY 1/> REGIONS: CPT | Performed by: OCCUPATIONAL THERAPIST

## 2022-01-03 NOTE — PROGRESS NOTES
Re-Assessment / Re-Certification      Patient: Mercy Dalton   : 1970  Diagnosis/ICD-10 Code:  Amputation of left index finger [S68.111A]  Referring practitioner: Leny Jerry MD  Date of Initial Visit: Type: THERAPY  Noted: 2021  Today's Date: 1/3/2022  Patient seen for 9 sessions      Subjective:   Mercy Dalton reports: I got the finger cots and those are helping. I still haven't tried to type with it. No real pain, just stiffness now.  Subjective Questionnaire: QuickDASH: 17  Clinical Progress: improved  Home Program Compliance: Yes  Treatment has included: therapeutic exercise, neuromuscular re-education, manual therapy, therapeutic activity and electrical stimulation    Subjective   Objective          Passive Range of Motion     Additional Passive Range of Motion Details  IF   0-80  0-40  Amp    LF  0-90  0-100  0-60    Strength/Myotome Testing     Left Wrist/Hand      (2nd hand position)   lbs: 25      Assessment/Plan    Visit Diagnoses:    ICD-10-CM ICD-9-CM   1. Amputation of left index finger  S68.111A 886.0   2. Dog bite of left hand, initial encounter  S61.452A 882.0    W54.0XXA E906.0   3. Pain of finger of left hand  M79.64 729.5   4. Stiffness of finger joint of left hand  M25.642 719.54       Progress toward previous goals: Partially Met    Plan Goals: 1. The patient complains of pain in the L hand                  LTG 1: 12 weeks:  The patient will report a pain rating of 0/10 or better in order to improve sleep quality and tolerance to performance of activities of daily living.                                  STATUS:  MET                  STG 1a: 4weeks:  The patient will report a pain rating of 1/10 or better.                                   STATUS:  MET  2. The patient has limited ROM of the L IF and LF                  LTG 2: 12 weeks:  The patient will demonstrate 200 degrees of L IF and 240 LF to allow the patient to  and be able to  type.                                  STATUS:  NOT MET                   STG 2a: 4 weeks:  The patient will demonstrate 160 degrees of RIOS of IF and LF.                                  STATUS:  NOT MET                          3. The patient has limited strength of the L UE.                  LTG 3: 12 weeks:  The patient will demonstrate 40# in order to return to PLOF.                                  STATUS:  NOT MET                  STG 3a: 4 weeks:  The patient will demonstrate tolerance to light strengthening without adverse reaction.                                  STATUS:  MET  4. Carrying, Moving, and Handling Objects Functional Limitation                                   LTG 4: 12 weeks:  The patient will demonstrate 0% limitation by achieving a score of 11 on the Quick DASH.                                  STATUS:  NOT MET                  STG 4a: 4 weeks:  The patient will demonstrate 20-39% limitation by achieving a score of 25 on the Quick DASH.                                    STATUS:  MET    Recommendations: Continue as planned  Timeframe: 2 months  Prognosis to achieve goals: good      OT SIGNATURE: RONNIE Foster, OTR/L, CHT     Electronically signed    [place] LICENSE: [id number]     Timed:  Manual Therapy:    10     mins  17618;  Therapeutic Exercise:    10     mins  05213;  Therapeutic Activity:    10     mins  17956;     Neuromuscular Waldo:    10    mins  68850;    Ultrasound:     0     mins  03230;    Electrical Stimulation:    0     mins  03053;    Untimed:  Electrical Stimulation:    0     mins  15881 ( );  Fluidotherapy:        10    mins  81637  Paraffin:                          0    mins  86877    Timed Treatment:   40   mins   Total Treatment:     50   mins

## 2022-01-04 ENCOUNTER — TELEPHONE (OUTPATIENT)
Dept: FAMILY MEDICINE CLINIC | Facility: CLINIC | Age: 52
End: 2022-01-04

## 2022-01-04 DIAGNOSIS — Z12.31 ENCOUNTER FOR SCREENING MAMMOGRAM FOR MALIGNANT NEOPLASM OF BREAST: Primary | ICD-10-CM

## 2022-01-04 NOTE — TELEPHONE ENCOUNTER
----- Message from JOSEPH Chiu sent at 1/3/2022  8:58 PM EST -----  Normal mammogram-repeat in 1 yr.

## 2022-01-05 ENCOUNTER — TREATMENT (OUTPATIENT)
Dept: PHYSICAL THERAPY | Facility: CLINIC | Age: 52
End: 2022-01-05

## 2022-01-05 DIAGNOSIS — M25.642 STIFFNESS OF FINGER JOINT OF LEFT HAND: ICD-10-CM

## 2022-01-05 DIAGNOSIS — S61.452A DOG BITE OF LEFT HAND, INITIAL ENCOUNTER: ICD-10-CM

## 2022-01-05 DIAGNOSIS — S68.111A AMPUTATION OF LEFT INDEX FINGER: Primary | ICD-10-CM

## 2022-01-05 DIAGNOSIS — M79.645 PAIN OF FINGER OF LEFT HAND: ICD-10-CM
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
## 2022-01-05 PROCEDURE — 97110 THERAPEUTIC EXERCISES: CPT | Performed by: OCCUPATIONAL THERAPIST

## 2022-01-05 PROCEDURE — 97022 WHIRLPOOL THERAPY: CPT | Performed by: OCCUPATIONAL THERAPIST

## 2022-01-05 PROCEDURE — 97140 MANUAL THERAPY 1/> REGIONS: CPT | Performed by: OCCUPATIONAL THERAPIST

## 2022-01-05 PROCEDURE — 97530 THERAPEUTIC ACTIVITIES: CPT | Performed by: OCCUPATIONAL THERAPIST

## 2022-01-05 NOTE — PROGRESS NOTES
Occupational Therapy Daily Treatment Note      Patient: Mercy Dalton   : 1970  Referring practitioner: Leny Jerry MD  Date of Initial Visit: Type: THERAPY  Noted: 2021  Today's Date: 2022  Patient seen for 10 sessions    ICD-10-CM ICD-9-CM   1. Amputation of left index finger  S68.111A 886.0   2. Dog bite of left hand, initial encounter  S61.452A 882.0    W54.0XXA E906.0   3. Pain of finger of left hand  M79.645 729.5   4. Stiffness of finger joint of left hand  M25.642 719.54          Mercy Dalton reports I have really cranked on it pulling it down and did the putty.  It all seems to be helping.       Objective   See Exercise, Manual, and Modality Logs for complete treatment.   0-80  0-50  amp    Assessment/Plan  Pt gained 10 degrees in PIP joint.  Attempted to press keys during typing.  Cont per POC           Timed:  Manual Therapy:    10     mins  67197;  Therapeutic Exercise:    10     mins  80002;  Therapeutic Activity:    10     mins  16748;     Neuromuscular Waldo:    0    mins  82252;    Ultrasound:     0     mins  13122;    Electrical Stimulation:    0     mins  17218;    Untimed:  Electrical Stimulation:    0     mins  66284 ( );  Fluidotherapy:        10    mins  34296  Paraffin:                          0    mins  15688    Timed Treatment:   30   mins   Total Treatment:     40   mins    OT SIGNATURE: RONNIE Foster, OTR/L, CHT     Electronically signed    [place] LICENSE: [id number]

## 2022-01-10 ENCOUNTER — TREATMENT (OUTPATIENT)
Dept: PHYSICAL THERAPY | Facility: CLINIC | Age: 52
End: 2022-01-10

## 2022-01-10 DIAGNOSIS — S68.111A AMPUTATION OF LEFT INDEX FINGER: Primary | ICD-10-CM

## 2022-01-10 PROCEDURE — 97530 THERAPEUTIC ACTIVITIES: CPT | Performed by: PHYSICAL THERAPIST

## 2022-01-10 PROCEDURE — 97110 THERAPEUTIC EXERCISES: CPT | Performed by: PHYSICAL THERAPIST

## 2022-01-10 PROCEDURE — 97022 WHIRLPOOL THERAPY: CPT | Performed by: PHYSICAL THERAPIST

## 2022-01-10 NOTE — PROGRESS NOTES
Occupational Therapy Daily Treatment Note      Patient: Mercy Dalton   : 1970  Referring practitioner: No ref. provider found  Date of Initial Visit: Type: THERAPY  Noted: 2021  Today's Date: 1/10/2022  Patient seen for 11 sessions         Mercy Dalton reports: no pain, it is just stiff.        Subjective     Objective   See Exercise, Manual, and Modality Logs for complete treatment.       Assessment/Plan    Visit Diagnoses:    ICD-10-CM ICD-9-CM   1. Amputation of left index finger  S68.111A 886.0     Pt tolerated treatment well. Continue per POC         Timed:  Manual Therapy:    5     mins  21260;  Therapeutic Exercise:    10     mins  08297;     Therapeutic Activity:    15     mins  40391;    Untimed:  Electrical Stimulation:    0     mins  95922 ( );  Fluidotherapy        10    mins  62641  Paraffin:                          0    mins  31230;    Timed Treatment:   30   mins   Total Treatment:     40   min    SALLY Meyer/L  Occupational Therapist    Electronically signed   License number [id number]

## 2022-01-13 ENCOUNTER — TREATMENT (OUTPATIENT)
Dept: PHYSICAL THERAPY | Facility: CLINIC | Age: 52
End: 2022-01-13

## 2022-01-13 DIAGNOSIS — M79.645 PAIN OF FINGER OF LEFT HAND: ICD-10-CM
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
## 2022-01-13 DIAGNOSIS — M25.642 STIFFNESS OF FINGER JOINT OF LEFT HAND: ICD-10-CM

## 2022-01-13 DIAGNOSIS — S61.452A DOG BITE OF LEFT HAND, INITIAL ENCOUNTER: ICD-10-CM

## 2022-01-13 DIAGNOSIS — S68.111A AMPUTATION OF LEFT INDEX FINGER: Primary | ICD-10-CM

## 2022-01-13 PROCEDURE — 97022 WHIRLPOOL THERAPY: CPT | Performed by: PHYSICAL THERAPIST

## 2022-01-13 PROCEDURE — 97530 THERAPEUTIC ACTIVITIES: CPT | Performed by: PHYSICAL THERAPIST

## 2022-01-13 PROCEDURE — 97110 THERAPEUTIC EXERCISES: CPT | Performed by: PHYSICAL THERAPIST

## 2022-01-13 NOTE — PROGRESS NOTES
Occupational Therapy Daily Treatment Note      Patient: Mercy Dalton   : 1970  Referring practitioner: No ref. provider found  Date of Initial Visit: Type: THERAPY  Noted: 2021  Today's Date: 2022  Patient seen for 12 sessions         Mercy Dalton reports: it is not as cold and purple today, but still stiff.        Subjective     Objective   See Exercise, Manual, and Modality Logs for complete treatment.       Assessment/Plan    Visit Diagnoses:    ICD-10-CM ICD-9-CM   1. Amputation of left index finger  S68.111A 886.0   2. Dog bite of left hand, initial encounter  S61.452A 882.0    W54.0XXA E906.0   3. Pain of finger of left hand  M63.645 729.5   4. Stiffness of finger joint of left hand  M26.642 719.54     Pt tolerated treatment well and less sensitivity. Continue per POC         Timed:  Manual Therapy:    5     mins  33825;  Therapeutic Exercise:    10     mins  48611;     Therapeutic Activity:    15     mins  90844;    Untimed:  Electrical Stimulation:    0     mins  97234 ( );  Fluidotherapy        10    mins  23888  Paraffin:                          0    mins  66520;    Timed Treatment:   30   mins   Total Treatment:     40   min    SALLY Meyer/L  Occupational Therapist    Electronically signed   License number [id number]

## 2022-01-18 ENCOUNTER — PATIENT MESSAGE (OUTPATIENT)
Dept: FAMILY MEDICINE CLINIC | Facility: CLINIC | Age: 52
End: 2022-01-18

## 2022-01-19 ENCOUNTER — TREATMENT (OUTPATIENT)
Dept: PHYSICAL THERAPY | Facility: CLINIC | Age: 52
End: 2022-01-19

## 2022-01-19 DIAGNOSIS — M25.642 STIFFNESS OF FINGER JOINT OF LEFT HAND: ICD-10-CM

## 2022-01-19 DIAGNOSIS — M79.645 PAIN OF FINGER OF LEFT HAND: ICD-10-CM

## 2022-01-19 DIAGNOSIS — S61.452A DOG BITE OF LEFT HAND, INITIAL ENCOUNTER: ICD-10-CM
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
## 2022-01-19 DIAGNOSIS — S68.111A AMPUTATION OF LEFT INDEX FINGER: Primary | ICD-10-CM

## 2022-01-19 PROCEDURE — 97112 NEUROMUSCULAR REEDUCATION: CPT | Performed by: OCCUPATIONAL THERAPIST

## 2022-01-19 PROCEDURE — 97140 MANUAL THERAPY 1/> REGIONS: CPT | Performed by: OCCUPATIONAL THERAPIST

## 2022-01-19 PROCEDURE — 97022 WHIRLPOOL THERAPY: CPT | Performed by: OCCUPATIONAL THERAPIST

## 2022-01-19 PROCEDURE — 97110 THERAPEUTIC EXERCISES: CPT | Performed by: OCCUPATIONAL THERAPIST

## 2022-01-19 NOTE — PROGRESS NOTES
Occupational Therapy Daily Treatment Note      Patient: Mercy Dalton   : 1970  Referring practitioner: No ref. provider found  Date of Initial Visit: Type: THERAPY  Noted: 2021  Today's Date: 2022  Patient seen for 13 sessions    ICD-10-CM ICD-9-CM   1. Amputation of left index finger  S68.111A 886.0   2. Dog bite of left hand, initial encounter  S61.452A 882.0    W54.0XXA E906.0   3. Pain of finger of left hand  M79.645 729.5   4. Stiffness of finger joint of left hand  M25.642 719.54          Mercy Dalton reports I have been cranking on it.      Objective   See Exercise, Manual, and Modality Logs for complete treatment.   IF 0-90      0-55       Amp      Assessment/Plan   15 degree gain in flexion of PIP joint.       Cont per POC           Timed:  Manual Therapy:    10     mins  97375;  Therapeutic Exercise:    10     mins  11400;  Therapeutic Activity:    0     mins  28329;     Neuromuscular Waldo:    10    mins  48495;    Ultrasound:     0     mins  98157;    Electrical Stimulation:    0     mins  46993;    Untimed:  Electrical Stimulation:    0     mins  56799 ( );  Fluidotherapy:        10    mins  55951  Paraffin:                          0    mins  79924    Timed Treatment:   30   mins   Total Treatment:     40   mins    OT SIGNATURE: RONNIE Foster, OTR/L, CHT     Electronically signed    [place] LICENSE: [id number]

## 2022-01-21 ENCOUNTER — TREATMENT (OUTPATIENT)
Dept: PHYSICAL THERAPY | Facility: CLINIC | Age: 52
End: 2022-01-21

## 2022-01-21 DIAGNOSIS — M25.642 STIFFNESS OF FINGER JOINT OF LEFT HAND: ICD-10-CM

## 2022-01-21 DIAGNOSIS — S61.452A DOG BITE OF LEFT HAND, INITIAL ENCOUNTER: ICD-10-CM

## 2022-01-21 DIAGNOSIS — M79.645 PAIN OF FINGER OF LEFT HAND: ICD-10-CM
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
## 2022-01-21 DIAGNOSIS — S68.111A AMPUTATION OF LEFT INDEX FINGER: Primary | ICD-10-CM

## 2022-01-21 PROCEDURE — 97140 MANUAL THERAPY 1/> REGIONS: CPT | Performed by: OCCUPATIONAL THERAPIST

## 2022-01-21 PROCEDURE — 97022 WHIRLPOOL THERAPY: CPT | Performed by: OCCUPATIONAL THERAPIST

## 2022-01-21 PROCEDURE — 97110 THERAPEUTIC EXERCISES: CPT | Performed by: OCCUPATIONAL THERAPIST

## 2022-01-21 PROCEDURE — 97530 THERAPEUTIC ACTIVITIES: CPT | Performed by: OCCUPATIONAL THERAPIST

## 2022-01-21 NOTE — PROGRESS NOTES
Occupational Therapy Daily Treatment Note      Patient: Mercy Dalton   : 1970  Referring practitioner: Leny Jerry MD  Date of Initial Visit: Type: THERAPY  Noted: 2021  Today's Date: 2022  Patient seen for 14 sessions    ICD-10-CM ICD-9-CM   1. Amputation of left index finger  S68.111A 886.0   2. Dog bite of left hand, initial encounter  S61.452A 882.0    W54.0XXA E906.0   3. Pain of finger of left hand  M79.645 729.5   4. Stiffness of finger joint of left hand  M25.642 719.54          Mercy Dalton reports my hand is just stiff today.  I was able to make a light fist yesterday.      Objective   See Exercise, Manual, and Modality Logs for complete treatment.   Custom tip to be used with silicone sleeve made this date and used to improve typing efficiency.     Assessment/Plan  Added blue foam to stretch intrinsics with flexion wrapping and gripping.       Cont per POC           Timed:  Manual Therapy:    10     mins  82464;  Therapeutic Exercise:    10     mins  73420;  Therapeutic Activity:    10     mins  81074;     Neuromuscular Waldo:    0    mins  63300;    Ultrasound:     0     mins  83990;    Electrical Stimulation:    0     mins  09543;    Untimed:  Electrical Stimulation:      0   mins  54298 ( );  Fluidotherapy:        10    mins  45332  Paraffin:                          0    mins  13948    Timed Treatment:   30   mins   Total Treatment:     40   mins    OT SIGNATURE: RONNIE Foster, OTR/L, CHT     Electronically signed    [place] LICENSE: [id number]

## 2022-01-24 ENCOUNTER — TREATMENT (OUTPATIENT)
Dept: PHYSICAL THERAPY | Facility: CLINIC | Age: 52
End: 2022-01-24
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
## 2022-01-24 DIAGNOSIS — M25.642 STIFFNESS OF FINGER JOINT OF LEFT HAND: ICD-10-CM

## 2022-01-24 DIAGNOSIS — S61.452A DOG BITE OF LEFT HAND, INITIAL ENCOUNTER: ICD-10-CM

## 2022-01-24 DIAGNOSIS — S68.111A AMPUTATION OF LEFT INDEX FINGER: Primary | ICD-10-CM

## 2022-01-24 DIAGNOSIS — M79.645 PAIN OF FINGER OF LEFT HAND: ICD-10-CM

## 2022-01-24 PROCEDURE — 97112 NEUROMUSCULAR REEDUCATION: CPT | Performed by: OCCUPATIONAL THERAPIST

## 2022-01-24 PROCEDURE — 97140 MANUAL THERAPY 1/> REGIONS: CPT | Performed by: OCCUPATIONAL THERAPIST

## 2022-01-24 PROCEDURE — 97022 WHIRLPOOL THERAPY: CPT | Performed by: OCCUPATIONAL THERAPIST

## 2022-01-24 PROCEDURE — 97110 THERAPEUTIC EXERCISES: CPT | Performed by: OCCUPATIONAL THERAPIST

## 2022-01-24 NOTE — PROGRESS NOTES
Occupational Therapy Daily Treatment Note      Patient: Mercy Dalton   : 1970  Referring practitioner: Leny Jerry MD  Date of Initial Visit: Type: THERAPY  Noted: 2021  Today's Date: 2022  Patient seen for 15 sessions    ICD-10-CM ICD-9-CM   1. Amputation of left index finger  S68.111A 886.0   2. Dog bite of left hand, initial encounter  S61.452A 882.0    W54.0XXA E906.0   3. Pain of finger of left hand  M79.645 729.5   4. Stiffness of finger joint of left hand  M25.642 719.54          Mercy Dalton reports trialed the added piece for typing has been helpful. Worked up to 30 minutes of typing.     Objective   See Exercise, Manual, and Modality Logs for complete treatment.   Pt progressing with ability to tolerate functional typing and pinching with L IF this date.      Assessment/Plan     Pt progressing with strengthening and functional re-ed.  Pt able to identify 5/5 with stereognosis this date.       Cont per POC           Timed:  Manual Therapy:    10     mins  65596;  Therapeutic Exercise:    10     mins  46712;  Therapeutic Activity:    0     mins  75954;     Neuromuscular Waldo:    10    mins  79486;    Ultrasound:     0     mins  92934;    Electrical Stimulation:    0     mins  60458;    Untimed:  Electrical Stimulation:    0     mins  39928 ( );  Fluidotherapy:        10    mins  60824  Paraffin:                          0    mins  85982    Timed Treatment:   30   mins   Total Treatment:     40   mins    OT SIGNATURE: RONNIE Foster, OTR/L, CHT     Electronically signed    [place] LICENSE: [id number]

## 2022-01-26 ENCOUNTER — TREATMENT (OUTPATIENT)
Dept: PHYSICAL THERAPY | Facility: CLINIC | Age: 52
End: 2022-01-26
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
## 2022-01-26 DIAGNOSIS — S68.111A AMPUTATION OF LEFT INDEX FINGER: Primary | ICD-10-CM

## 2022-01-26 DIAGNOSIS — S61.452A DOG BITE OF LEFT HAND, INITIAL ENCOUNTER: ICD-10-CM

## 2022-01-26 DIAGNOSIS — M25.642 STIFFNESS OF FINGER JOINT OF LEFT HAND: ICD-10-CM

## 2022-01-26 DIAGNOSIS — M79.645 PAIN OF FINGER OF LEFT HAND: ICD-10-CM

## 2022-01-26 PROCEDURE — 97110 THERAPEUTIC EXERCISES: CPT | Performed by: OCCUPATIONAL THERAPIST

## 2022-01-26 PROCEDURE — 97140 MANUAL THERAPY 1/> REGIONS: CPT | Performed by: OCCUPATIONAL THERAPIST

## 2022-01-26 PROCEDURE — 97022 WHIRLPOOL THERAPY: CPT | Performed by: OCCUPATIONAL THERAPIST

## 2022-01-26 PROCEDURE — 97112 NEUROMUSCULAR REEDUCATION: CPT | Performed by: OCCUPATIONAL THERAPIST

## 2022-01-26 NOTE — PROGRESS NOTES
Occupational Therapy Daily Treatment Note      Patient: Mercy Dalton   : 1970  Referring practitioner: Leny Jerry MD  Date of Initial Visit: Type: THERAPY  Noted: 2021  Today's Date: 2022  Patient seen for 16 sessions  No diagnosis found.       Mercy Dalton reports I tried typing and the cap is helpful but could be angled slightly to increase function.        Objective   See Exercise, Manual, and Modality Logs for complete treatment.   Key pinch continues to be difficult to perform.  Discussed using putty for this task at home.    Assessment/Plan  Pt is progressing with pinch and       Cont per POC           Timed:  Manual Therapy:    10     mins  50892;  Therapeutic Exercise:    10     mins  85430;  Therapeutic Activity:    0     mins  82228;     Neuromuscular Waldo:   10    mins  44122;    Ultrasound:     0     mins  01383;    Electrical Stimulation:    0     mins  43279;    Untimed:  Electrical Stimulation:    0     mins  14892 ( );  Fluidotherapy:        10    mins  72166  Paraffin:                          0    mins  97202    Timed Treatment:   30   mins   Total Treatment:     40   mins    OT SIGNATURE: RONNIE Foster, OTR/L, CHT     Electronically signed    [place] LICENSE: [id number]

## 2022-02-02 DIAGNOSIS — E03.9 HYPOTHYROIDISM, UNSPECIFIED TYPE: Primary | ICD-10-CM

## 2022-02-02 RX ORDER — LEVOTHYROXINE SODIUM 0.03 MG/1
25 TABLET ORAL DAILY
Qty: 30 TABLET | Refills: 0 | Status: SHIPPED | OUTPATIENT
Start: 2022-02-02 | End: 2022-04-04 | Stop reason: SDUPTHER

## 2022-02-03 ENCOUNTER — TREATMENT (OUTPATIENT)
Dept: PHYSICAL THERAPY | Facility: CLINIC | Age: 52
End: 2022-02-03

## 2022-02-03 DIAGNOSIS — S61.452A DOG BITE OF LEFT HAND, INITIAL ENCOUNTER: ICD-10-CM
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
## 2022-02-03 DIAGNOSIS — M25.642 STIFFNESS OF FINGER JOINT OF LEFT HAND: ICD-10-CM

## 2022-02-03 DIAGNOSIS — M79.645 PAIN OF FINGER OF LEFT HAND: ICD-10-CM

## 2022-02-03 DIAGNOSIS — S68.111A AMPUTATION OF LEFT INDEX FINGER: Primary | ICD-10-CM

## 2022-02-03 PROCEDURE — 97112 NEUROMUSCULAR REEDUCATION: CPT | Performed by: OCCUPATIONAL THERAPIST

## 2022-02-03 PROCEDURE — 97110 THERAPEUTIC EXERCISES: CPT | Performed by: OCCUPATIONAL THERAPIST

## 2022-02-03 PROCEDURE — 97140 MANUAL THERAPY 1/> REGIONS: CPT | Performed by: OCCUPATIONAL THERAPIST

## 2022-02-03 PROCEDURE — 97022 WHIRLPOOL THERAPY: CPT | Performed by: OCCUPATIONAL THERAPIST

## 2022-02-03 NOTE — PROGRESS NOTES
Re-Assessment / Re-Certification      Patient: Mercy Dalton   : 1970  Diagnosis/ICD-10 Code:  Amputation of left index finger [S68.111A]  Referring practitioner: Leny Jerry MD  Date of Initial Visit: Type: THERAPY  Noted: 2021  Today's Date: 2/3/2022  Patient seen for 17 sessions      Subjective:   Mercy Dalton reports: It is getting better, I use the extension at the keyboard for an extended period of time.   Subjective Questionnaire: QuickDASH: 16  Clinical Progress: improved  Home Program Compliance: Yes  Treatment has included: therapeutic exercise, neuromuscular re-education, manual therapy and therapeutic activity    Subjective   Objective          Active Range of Motion     Additional Active Range of Motion Details  IF  0-90  0-70  amp    Strength/Myotome Testing     Left Wrist/Hand      (2nd hand position)   Left  strength (2nd hand position) 35 lbs      Assessment/Plan    Visit Diagnoses:    ICD-10-CM ICD-9-CM   1. Amputation of left index finger  S68.111A 886.0   2. Dog bite of left hand, initial encounter  S61.452A 882.0    W54.0XXA E906.0   3. Pain of finger of left hand  M79.645 729.5   4. Stiffness of finger joint of left hand  M25.642 719.54       Progress toward previous goals: Partially Met    Plan Goals: 1. The patient complains of pain in the L hand                  LTG 1: 12 weeks:  The patient will report a pain rating of 0/10 or better in order to improve sleep quality and tolerance to performance of activities of daily living.                                  STATUS:  MET                  STG 1a: 4weeks:  The patient will report a pain rating of 1/10 or better.                                   STATUS:  MET  2. The patient has limited ROM of the L IF and LF                  LTG 2: 12 weeks:  The patient will demonstrate 200 degrees of L IF and 240 LF to allow the patient to  and be able to type.                                  STATUS:  NOT MET                    STG 2a: 4 weeks:  The patient will demonstrate 160 degrees of RIOS of IF and LF.                                  STATUS:  MET                          3. The patient has limited strength of the L UE.                  LTG 3: 12 weeks:  The patient will demonstrate 40# in order to return to PLOF.                                  STATUS:  NOT MET                  STG 3a: 4 weeks:  The patient will demonstrate tolerance to light strengthening without adverse reaction.                                  STATUS:  MET  4. Carrying, Moving, and Handling Objects Functional Limitation                                   LTG 4: 12 weeks:  The patient will demonstrate 0% limitation by achieving a score of 11 on the Quick DASH.                                  STATUS:  NOT MET                  STG 4a: 4 weeks:  The patient will demonstrate 20-39% limitation by achieving a score of 25 on the Quick DASH.                                    STATUS:  MET     Recommendations: Continue as planned  Timeframe: 1 month  Prognosis to achieve goals: good      OT SIGNATURE: RONNIE Foster, OTR/L, CHT     Electronically signed    [place] LICENSE: [id number]         Timed:  Manual Therapy:    10     mins  29415;  Therapeutic Exercise:    10     mins  78005;  Therapeutic Activity:    0     mins  82864;     Neuromuscular Waldo:    10    mins  91556;    Ultrasound:     0     mins  43996;    Electrical Stimulation:    0     mins  62229;    Untimed:  Electrical Stimulation:    0     mins  98144 ( );  Fluidotherapy:        10    mins  58491  Paraffin:                          0    mins  35057    Timed Treatment:   30   mins   Total Treatment:     40   mins

## 2022-02-07 ENCOUNTER — TREATMENT (OUTPATIENT)
Dept: PHYSICAL THERAPY | Facility: CLINIC | Age: 52
End: 2022-02-07

## 2022-02-07 DIAGNOSIS — M79.645 PAIN OF FINGER OF LEFT HAND: ICD-10-CM

## 2022-02-07 DIAGNOSIS — S68.111A AMPUTATION OF LEFT INDEX FINGER: Primary | ICD-10-CM

## 2022-02-07 DIAGNOSIS — S61.452A DOG BITE OF LEFT HAND, INITIAL ENCOUNTER: ICD-10-CM

## 2022-02-07 DIAGNOSIS — M25.642 STIFFNESS OF FINGER JOINT OF LEFT HAND: ICD-10-CM
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
## 2022-02-07 PROCEDURE — 97110 THERAPEUTIC EXERCISES: CPT | Performed by: OCCUPATIONAL THERAPIST

## 2022-02-07 PROCEDURE — 97140 MANUAL THERAPY 1/> REGIONS: CPT | Performed by: OCCUPATIONAL THERAPIST

## 2022-02-07 PROCEDURE — 97022 WHIRLPOOL THERAPY: CPT | Performed by: OCCUPATIONAL THERAPIST

## 2022-02-07 PROCEDURE — 97530 THERAPEUTIC ACTIVITIES: CPT | Performed by: OCCUPATIONAL THERAPIST

## 2022-02-07 NOTE — PROGRESS NOTES
Occupational Therapy Daily Treatment Note      Patient: Mercy Dalton   : 1970  Referring practitioner: Leny Jerry MD  Date of Initial Visit: Type: THERAPY  Noted: 2021  Today's Date: 2022  Patient seen for 18 sessions    ICD-10-CM ICD-9-CM   1. Amputation of left index finger  S68.111A 886.0   2. Dog bite of left hand, initial encounter  S61.452A 882.0    W54.0XXA E906.0   3. Pain of finger of left hand  M79.645 729.5   4. Stiffness of finger joint of left hand  M25.642 719.54          Mercy Dalton reports I am stiff today, but doing well.     Objective   See Exercise, Manual, and Modality Logs for complete treatment.   Pt progressing with strengthening and tolerance for functional tasks.    Assessment/Plan  Deep pressure through IF continues to cause discomfort when pushed through tip.      Cont per POC           Timed:  Manual Therapy:    10     mins  96554;  Therapeutic Exercise:    10     mins  65443;  Therapeutic Activity:    10     mins  28545;     Neuromuscular Waldo:    0    mins  51611;    Ultrasound:     0     mins  57050;    Electrical Stimulation:    0     mins  38798;    Untimed:  Electrical Stimulation:    0     mins  91642 ( );  Fluidotherapy:        10    mins  18877  Paraffin:                          0    mins  94594    Timed Treatment:   30   mins   Total Treatment:     40   mins    OT SIGNATURE: RONNIE Foster, OTR/L, CHT     Electronically signed    [place] LICENSE: [id number]

## 2022-02-09 ENCOUNTER — TREATMENT (OUTPATIENT)
Dept: PHYSICAL THERAPY | Facility: CLINIC | Age: 52
End: 2022-02-09

## 2022-02-09 DIAGNOSIS — S68.111A AMPUTATION OF LEFT INDEX FINGER: Primary | ICD-10-CM

## 2022-02-09 DIAGNOSIS — S68.611D: ICD-10-CM

## 2022-02-09 DIAGNOSIS — M25.642 STIFFNESS OF FINGER JOINT OF LEFT HAND: ICD-10-CM
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
## 2022-02-09 DIAGNOSIS — S61.452A DOG BITE OF LEFT HAND, INITIAL ENCOUNTER: ICD-10-CM

## 2022-02-09 DIAGNOSIS — M79.645 PAIN OF FINGER OF LEFT HAND: ICD-10-CM

## 2022-02-09 PROCEDURE — 97110 THERAPEUTIC EXERCISES: CPT | Performed by: OCCUPATIONAL THERAPIST

## 2022-02-09 PROCEDURE — 97140 MANUAL THERAPY 1/> REGIONS: CPT | Performed by: OCCUPATIONAL THERAPIST

## 2022-02-09 PROCEDURE — 97530 THERAPEUTIC ACTIVITIES: CPT | Performed by: OCCUPATIONAL THERAPIST

## 2022-02-14 ENCOUNTER — TREATMENT (OUTPATIENT)
Dept: PHYSICAL THERAPY | Facility: CLINIC | Age: 52
End: 2022-02-14
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
## 2022-02-14 DIAGNOSIS — M25.642 STIFFNESS OF FINGER JOINT OF LEFT HAND: ICD-10-CM

## 2022-02-14 DIAGNOSIS — S68.111A AMPUTATION OF LEFT INDEX FINGER: Primary | ICD-10-CM

## 2022-02-14 DIAGNOSIS — S61.452A DOG BITE OF LEFT HAND, INITIAL ENCOUNTER: ICD-10-CM

## 2022-02-14 DIAGNOSIS — M79.645 PAIN OF FINGER OF LEFT HAND: ICD-10-CM

## 2022-02-14 PROCEDURE — 97110 THERAPEUTIC EXERCISES: CPT | Performed by: OCCUPATIONAL THERAPIST

## 2022-02-14 PROCEDURE — 97112 NEUROMUSCULAR REEDUCATION: CPT | Performed by: OCCUPATIONAL THERAPIST

## 2022-02-14 PROCEDURE — 97530 THERAPEUTIC ACTIVITIES: CPT | Performed by: OCCUPATIONAL THERAPIST

## 2022-02-14 NOTE — PROGRESS NOTES
Occupational Therapy Daily Treatment Note      Patient: Mercy Dalton   : 1970  Referring practitioner: Leny Jerry MD  Date of Initial Visit: Type: THERAPY  Noted: 2021  Today's Date: 2022  Patient seen for 20 sessions    ICD-10-CM ICD-9-CM   1. Amputation of left index finger  S68.111A 886.0   2. Dog bite of left hand, initial encounter  S61.452A 882.0    W54.0XXA E906.0   3. Pain of finger of left hand  M79.645 729.5   4. Stiffness of finger joint of left hand  M25.642 719.54          Mercy Dalton reports I am tired today, but my hand is feeling some better.       Objective   See Exercise, Manual, and Modality Logs for complete treatment.   Pt progressing with tolerance for functional strengthening and gripping.     Assessment/Plan  Pt progressing with fisting and desensitization and tolerance for sensory re-e.      Cont per POC           Timed:  Manual Therapy:    0     mins  12898;  Therapeutic Exercise:    10     mins  49963;  Therapeutic Activity:    10     mins  36090;     Neuromuscular Waldo:    10    mins  84000;    Ultrasound:     0     mins  43414;    Electrical Stimulation:    0     mins  32120;    Untimed:  Electrical Stimulation:    0     mins  57060 ( );  Fluidotherapy:        0    mins  18292  Paraffin:                          0    mins  08503    Timed Treatment:   30   mins   Total Treatment:     30   mins    OT SIGNATURE: RONNIE Foster, OTR/L, CHT     Electronically signed    [place] LICENSE: [id number]

## 2022-02-16 ENCOUNTER — TREATMENT (OUTPATIENT)
Dept: PHYSICAL THERAPY | Facility: CLINIC | Age: 52
End: 2022-02-16

## 2022-02-16 DIAGNOSIS — M79.645 PAIN OF FINGER OF LEFT HAND: ICD-10-CM

## 2022-02-16 DIAGNOSIS — S68.111A AMPUTATION OF LEFT INDEX FINGER: Primary | ICD-10-CM

## 2022-02-16 DIAGNOSIS — S61.452A DOG BITE OF LEFT HAND, INITIAL ENCOUNTER: ICD-10-CM
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
## 2022-02-16 DIAGNOSIS — M25.642 STIFFNESS OF FINGER JOINT OF LEFT HAND: ICD-10-CM

## 2022-02-16 PROCEDURE — 97530 THERAPEUTIC ACTIVITIES: CPT | Performed by: OCCUPATIONAL THERAPIST

## 2022-02-16 PROCEDURE — 97110 THERAPEUTIC EXERCISES: CPT | Performed by: OCCUPATIONAL THERAPIST

## 2022-02-16 PROCEDURE — 97112 NEUROMUSCULAR REEDUCATION: CPT | Performed by: OCCUPATIONAL THERAPIST

## 2022-02-16 PROCEDURE — 97022 WHIRLPOOL THERAPY: CPT | Performed by: OCCUPATIONAL THERAPIST

## 2022-02-16 NOTE — PROGRESS NOTES
Occupational Therapy Daily Treatment Note      Patient: Mercy Dalton   : 1970  Referring practitioner: Leny Jerry MD  Date of Initial Visit: Type: THERAPY  Noted: 2021  Today's Date: 2022  Patient seen for 21 sessions    ICD-10-CM ICD-9-CM   1. Amputation of left index finger  S68.111A 886.0   2. Dog bite of left hand, initial encounter  S61.452A 882.0    W54.0XXA E906.0   3. Pain of finger of left hand  M79.645 729.5   4. Stiffness of finger joint of left hand  M25.642 719.54          Mercy Dalton reports I am not having pain in the finger, I am improving functionally.       Objective   See Exercise, Manual, and Modality Logs for complete treatment.       Assessment/Plan  Typing is improving, pt is continuing develop her rhythm.  Sensitivity is improving.    Cont per POC           Timed:  Manual Therapy:    0     mins  28488;  Therapeutic Exercise:    10     mins  20795;  Therapeutic Activity:    10     mins  13441;     Neuromuscular Waldo:    10    mins  23779;    Ultrasound:     0     mins  05842;    Electrical Stimulation:    0     mins  48640;    Untimed:  Electrical Stimulation:    0     mins  40284 ( );  Fluidotherapy:        10    mins  41935  Paraffin:                          0    mins  54292    Timed Treatment:   30   mins   Total Treatment:     40   mins    OT SIGNATURE: RONNIE Foster, OTR/L, CHT     Electronically signed    [place] LICENSE: [id number]

## 2022-02-21 ENCOUNTER — TREATMENT (OUTPATIENT)
Dept: PHYSICAL THERAPY | Facility: CLINIC | Age: 52
End: 2022-02-21

## 2022-02-21 DIAGNOSIS — S68.111A AMPUTATION OF LEFT INDEX FINGER: Primary | ICD-10-CM

## 2022-02-21 DIAGNOSIS — M79.645 PAIN OF FINGER OF LEFT HAND: ICD-10-CM

## 2022-02-21 DIAGNOSIS — S61.452A DOG BITE OF LEFT HAND, INITIAL ENCOUNTER: ICD-10-CM

## 2022-02-21 DIAGNOSIS — S68.611D: ICD-10-CM

## 2022-02-21 DIAGNOSIS — M25.642 STIFFNESS OF FINGER JOINT OF LEFT HAND: ICD-10-CM
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
## 2022-02-21 PROCEDURE — 97140 MANUAL THERAPY 1/> REGIONS: CPT | Performed by: OCCUPATIONAL THERAPIST

## 2022-02-21 PROCEDURE — 97112 NEUROMUSCULAR REEDUCATION: CPT | Performed by: OCCUPATIONAL THERAPIST

## 2022-02-21 PROCEDURE — 97110 THERAPEUTIC EXERCISES: CPT | Performed by: OCCUPATIONAL THERAPIST

## 2022-02-21 NOTE — PROGRESS NOTES
Occupational Therapy Daily Treatment Note      Patient: Mercy Dalton   : 1970  Referring practitioner: Leny Jerry MD  Date of Initial Visit: Type: THERAPY  Noted: 2021  Today's Date: 2022  Patient seen for 22 sessions    ICD-10-CM ICD-9-CM   1. Amputation of left index finger  S68.111A 886.0   2. Dog bite of left hand, initial encounter  S61.452A 882.0    W54.0XXA E906.0   3. Pain of finger of left hand  M79.645 729.5   4. Stiffness of finger joint of left hand  M25.642 719.54   5. 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     Objective   See Exercise, Manual, and Modality Logs for complete treatment.       Assessment/Plan  Pt progressing with strengthening, continues to have tightness  And hypersensitivity.       Cont per POC           Timed:  Manual Therapy:    10     mins  62441;  Therapeutic Exercise:    10     mins  21659;  Therapeutic Activity:    0     mins  67738;     Neuromuscular Waldo:    10    mins  37616;    Ultrasound:     0     mins  26957;    Electrical Stimulation:    0     mins  51529;    Untimed:  Electrical Stimulation:    0     mins  20797 ( );  Fluidotherapy:        0    mins  52621  Paraffin:                          0    mins  68060    Timed Treatment:   30   mins   Total Treatment:     30   mins    OT SIGNATURE: RONNIE Foster, OTR/L, CHT     Electronically signed    [place] LICENSE: [id number]

## 2022-02-23 ENCOUNTER — TREATMENT (OUTPATIENT)
Dept: PHYSICAL THERAPY | Facility: CLINIC | Age: 52
End: 2022-02-23

## 2022-02-23 DIAGNOSIS — S61.452A DOG BITE OF LEFT HAND, INITIAL ENCOUNTER: ICD-10-CM

## 2022-02-23 DIAGNOSIS — M79.645 PAIN OF FINGER OF LEFT HAND: ICD-10-CM

## 2022-02-23 DIAGNOSIS — M25.642 STIFFNESS OF FINGER JOINT OF LEFT HAND: ICD-10-CM
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
## 2022-02-23 DIAGNOSIS — S68.111A AMPUTATION OF LEFT INDEX FINGER: Primary | ICD-10-CM

## 2022-02-23 PROCEDURE — 97110 THERAPEUTIC EXERCISES: CPT | Performed by: OCCUPATIONAL THERAPIST

## 2022-02-23 PROCEDURE — 97140 MANUAL THERAPY 1/> REGIONS: CPT | Performed by: OCCUPATIONAL THERAPIST

## 2022-02-23 PROCEDURE — 97112 NEUROMUSCULAR REEDUCATION: CPT | Performed by: OCCUPATIONAL THERAPIST

## 2022-02-23 PROCEDURE — 97022 WHIRLPOOL THERAPY: CPT | Performed by: OCCUPATIONAL THERAPIST

## 2022-02-23 NOTE — PROGRESS NOTES
Occupational Therapy Daily Treatment Note      Patient: Mercy Dalton   : 1970  Referring practitioner: Leny Jerry MD  Date of Initial Visit: Type: THERAPY  Noted: 2021  Today's Date: 2022  Patient seen for 23 sessions    ICD-10-CM ICD-9-CM   1. Amputation of left index finger  S68.111A 886.0   2. Pain of finger of left hand  M79.645 729.5   3. Dog bite of left hand, initial encounter  S61.452A 882.0    W54.0XXA E906.0   4. Stiffness of finger joint of left hand  M25.642 719.54          Mercy Dalton reports L LF scar is becoming annoying.  It is super sensitive.       Objective   See Exercise, Manual, and Modality Logs for complete treatment.   Having sensitivity at tip of IF with washing hands and putting on lotion.     Assessment/Plan  Recommend paper tape over scar to lay it flat and reduce sensitivity during functional tasks.       Cont per POC           Timed:  Manual Therapy:    10     mins  55101;  Therapeutic Exercise:    10     mins  30937;  Therapeutic Activity:    0     mins  66943;     Neuromuscular Waldo:    10    mins  55045;    Ultrasound:     0     mins  43397;    Electrical Stimulation:    0     mins  37593;    Untimed:  Electrical Stimulation:    0     mins  41500 ( );  Fluidotherapy:        10    mins  42305  Paraffin:                          0    mins  96958    Timed Treatment:   30   mins   Total Treatment:     40   mins    OT SIGNATURE: RONNIE Foster, OTR/L, CHT     Electronically signed    [place] LICENSE: [id number]

## 2022-02-28 ENCOUNTER — TREATMENT (OUTPATIENT)
Dept: PHYSICAL THERAPY | Facility: CLINIC | Age: 52
End: 2022-02-28

## 2022-02-28 DIAGNOSIS — S68.611D: ICD-10-CM
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
## 2022-02-28 DIAGNOSIS — S61.452A DOG BITE OF LEFT HAND, INITIAL ENCOUNTER: ICD-10-CM

## 2022-02-28 DIAGNOSIS — S68.111A AMPUTATION OF LEFT INDEX FINGER: Primary | ICD-10-CM

## 2022-02-28 DIAGNOSIS — M25.642 STIFFNESS OF FINGER JOINT OF LEFT HAND: ICD-10-CM

## 2022-02-28 DIAGNOSIS — M79.645 PAIN OF FINGER OF LEFT HAND: ICD-10-CM

## 2022-02-28 PROCEDURE — 97112 NEUROMUSCULAR REEDUCATION: CPT | Performed by: OCCUPATIONAL THERAPIST

## 2022-02-28 PROCEDURE — 97110 THERAPEUTIC EXERCISES: CPT | Performed by: OCCUPATIONAL THERAPIST

## 2022-02-28 PROCEDURE — 97140 MANUAL THERAPY 1/> REGIONS: CPT | Performed by: OCCUPATIONAL THERAPIST

## 2022-02-28 NOTE — PROGRESS NOTES
Re-Assessment / Re-Certification      Patient: Mercy Dalton   : 1970  Diagnosis/ICD-10 Code:  Amputation of left index finger [S68.111A]  Referring practitioner: Leny Jerry MD  Date of Initial Visit: Type: THERAPY  Noted: 2021  Today's Date: 2022  Patient seen for 24 sessions      Subjective:   Mercy Dalton reports: I am still having intermittent tingling in both the areas of scar tissue.   Subjective Questionnaire: QuickDASH: 14  Clinical Progress: improved  Home Program Compliance: Yes  Treatment has included: therapeutic exercise, neuromuscular re-education, manual therapy and fluidotherapy    Subjective   Objective          Neurological Testing     Sensation     Wrist/Hand   Left   Hypersensation: light touch     Active Range of Motion     Additional Active Range of Motion Details  0-90 0-75 amp  0-90 0-100 0-65  0-90 0-100 0-75  0-80 0-100 0-75    Strength/Myotome Testing     Left Wrist/Hand      (2nd hand position)   Left  strength (2nd hand position) 38 lbs      Assessment/Plan    Visit Diagnoses:    ICD-10-CM ICD-9-CM   1. Amputation of left index finger  S68.111A 886.0   2. Pain of finger of left hand  M79.645 729.5   3. Dog bite of left hand, initial encounter  S61.452A 882.0    W54.0XXA E906.0   4. Stiffness of finger joint of left hand  M25.642 719.54   5. 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 The patient complains of pain in the L hand                  LTG 1: 12 weeks:  The patient will report a pain rating of 0/10 or better in order to improve sleep quality and tolerance to performance of activities of daily living.                                  STATUS:  MET                  STG 1a: 4weeks:  The patient will report a pain rating of 1/10 or better.                                   STATUS:  MET  2. The patient has  limited ROM of the L IF and LF                  LTG 2: 12 weeks:  The patient will demonstrate 200 degrees of L IF and 240 LF to allow the patient to  and be able to type.                                  STATUS:  MET                   STG 2a: 4 weeks:  The patient will demonstrate 160 degrees of RIOS of IF and LF.                                  STATUS:  MET                          3. The patient has limited strength of the L UE.                  LTG 3: 12 weeks:  The patient will demonstrate 40# in order to return to PLOF.                                  STATUS:  NOT MET                  STG 3a: 4 weeks:  The patient will demonstrate tolerance to light strengthening without adverse reaction.                                  STATUS:  MET  4. Carrying, Moving, and Handling Objects Functional Limitation                                   LTG 4: 12 weeks:  The patient will demonstrate 0% limitation by achieving a score of 11 on the Quick DASH.                                  STATUS:  NOT MET                  STG 4a: 4 weeks:  The patient will demonstrate 20-39% limitation by achieving a score of 25 on the Quick DASH.                                    STATUS:  MET      Recommendations: Continue with recommendations 1x/wk x 4 wks  Timeframe: 1 month  Prognosis to achieve goals: good      OT SIGNATURE: RONNIE Foster, OTR/L, CHT     Electronically signed    [place] LICENSE: [id number]   DATE TREATMENT INITIATED: 2/28/2022      Initial Certification  Certification Period: 2/28/2022 thru 5/28/2022  I certify that the therapy services are furnished while this patient is under my care.  The services outlined above are required by this patient, and will be reviewed every 90 days.      Based upon review of the patient's progress and continued therapy plan, it is my medical opinion that Mercy Dalton should continue occupational therapy treatment at [facility]  [address]  [place]  [place] [address]  [phone].    Signature: __________________________________  PHYSICIAN: Leny Jerry MD  NPI: [id number]                                      DATE:      Timed:  Manual Therapy:    10     mins  88982;  Therapeutic Exercise:    10     mins  76577;  Therapeutic Activity:    0     mins  35903;     Neuromuscular Waldo:    10    mins  22157;    Ultrasound:     0     mins  03156;    Electrical Stimulation:    0     mins  66497;    Untimed:  Electrical Stimulation:    0     mins  18020 ( );  Fluidotherapy:        0    mins  38565  Paraffin:                          0    mins  72634    Timed Treatment:   30   mins   Total Treatment:     30   mins

## 2022-03-09 ENCOUNTER — TREATMENT (OUTPATIENT)
Dept: PHYSICAL THERAPY | Facility: CLINIC | Age: 52
End: 2022-03-09

## 2022-03-09 DIAGNOSIS — M25.642 STIFFNESS OF FINGER JOINT OF LEFT HAND: ICD-10-CM
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
## 2022-03-09 DIAGNOSIS — M79.645 PAIN OF FINGER OF LEFT HAND: ICD-10-CM

## 2022-03-09 DIAGNOSIS — S61.452A DOG BITE OF LEFT HAND, INITIAL ENCOUNTER: ICD-10-CM

## 2022-03-09 DIAGNOSIS — S68.111A AMPUTATION OF LEFT INDEX FINGER: Primary | ICD-10-CM

## 2022-03-09 PROCEDURE — 97110 THERAPEUTIC EXERCISES: CPT | Performed by: OCCUPATIONAL THERAPIST

## 2022-03-09 PROCEDURE — 97530 THERAPEUTIC ACTIVITIES: CPT | Performed by: OCCUPATIONAL THERAPIST

## 2022-03-09 PROCEDURE — 97112 NEUROMUSCULAR REEDUCATION: CPT | Performed by: OCCUPATIONAL THERAPIST

## 2022-03-09 NOTE — PROGRESS NOTES
Occupational Therapy Daily Treatment Note      Patient: Mercy Dalton   : 1970  Referring practitioner: Leny Jerry MD  Date of Initial Visit: Type: THERAPY  Noted: 2021  Today's Date: 3/9/2022  Patient seen for 25 sessions    ICD-10-CM ICD-9-CM   1. Amputation of left index finger  S68.111A 886.0   2. Pain of finger of left hand  M79.645 729.5   3. Dog bite of left hand, initial encounter  S61.452A 882.0    W54.0XXA E906.0   4. Stiffness of finger joint of left hand  M25.642 719.54          Mercy Dalton reports using it a little more in day to day tasks.       Objective   See Exercise, Manual, and Modality Logs for complete treatment.   Pt progressing with tolerance to deep pressure.      Assessment/Plan  pushed rubix ball x 3 for the first time with minimal discomfort.      Cont per POC           Timed:  Manual Therapy:    0     mins  27501;  Therapeutic Exercise:    10     mins  44945;  Therapeutic Activity:    10     mins  23435;     Neuromuscular Waldo:    10    mins  95413;    Ultrasound:     0     mins  53543;    Electrical Stimulation:    0     mins  34283;    Untimed:  Electrical Stimulation:    00     mins  69250 ( );  Fluidotherapy:        0    mins  15443  Paraffin:                          0    mins  88755    Timed Treatment:   30   mins   Total Treatment:     30   mins    OT SIGNATURE: RONNIE Foster, OTR/L, CHT     Electronically signed    [place] LICENSE: [id number]

## 2022-03-21 ENCOUNTER — TREATMENT (OUTPATIENT)
Dept: PHYSICAL THERAPY | Facility: CLINIC | Age: 52
End: 2022-03-21
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
## 2022-03-21 DIAGNOSIS — S61.452A DOG BITE OF LEFT HAND, INITIAL ENCOUNTER: ICD-10-CM

## 2022-03-21 DIAGNOSIS — M79.645 PAIN OF FINGER OF LEFT HAND: ICD-10-CM

## 2022-03-21 DIAGNOSIS — S68.111A AMPUTATION OF LEFT INDEX FINGER: Primary | ICD-10-CM

## 2022-03-21 DIAGNOSIS — M25.642 STIFFNESS OF FINGER JOINT OF LEFT HAND: ICD-10-CM

## 2022-03-21 DIAGNOSIS — S68.611D: ICD-10-CM

## 2022-03-21 PROCEDURE — 97140 MANUAL THERAPY 1/> REGIONS: CPT | Performed by: OCCUPATIONAL THERAPIST

## 2022-03-21 PROCEDURE — 97112 NEUROMUSCULAR REEDUCATION: CPT | Performed by: OCCUPATIONAL THERAPIST

## 2022-03-21 PROCEDURE — 97110 THERAPEUTIC EXERCISES: CPT | Performed by: OCCUPATIONAL THERAPIST

## 2022-03-21 PROCEDURE — 97022 WHIRLPOOL THERAPY: CPT | Performed by: OCCUPATIONAL THERAPIST

## 2022-03-21 NOTE — PROGRESS NOTES
Occupational Therapy Daily Treatment Note      Patient: Mercy Dalton   : 1970  Referring practitioner: Leny Jerry MD  Date of Initial Visit: Type: THERAPY  Noted: 2021  Today's Date: 3/21/2022  Patient seen for 26 sessions    ICD-10-CM ICD-9-CM   1. Amputation of left index finger  S68.111A 886.0   2. Pain of finger of left hand  M79.645 729.5   3. Dog bite of left hand, initial encounter  S61.452A 882.0    W54.0XXA E906.0   4. Stiffness of finger joint of left hand  M25.642 719.54   5. 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      Objective   See Exercise, Manual, and Modality Logs for complete treatment.   IF  0-95  0-85  Amp       Assessment/Plan  Pt having increased sensitivity in IF, which improved with desensitization techniques.       Cont per POC           Timed:  Manual Therapy:    10     mins  78410;  Therapeutic Exercise:    10     mins  31490;  Therapeutic Activity:    0     mins  85196;     Neuromuscular Waldo:    10    mins  29750;    Ultrasound:     0     mins  30387;    Electrical Stimulation:    0     mins  99168;    Untimed:  Electrical Stimulation:    0     mins  63004 ( );  Fluidotherapy:        10    mins  84581  Paraffin:                          0    mins  45482    Timed Treatment:   30   mins   Total Treatment:     40   mins    OT SIGNATURE: RONINE Foster, OTR/L, CHT     Electronically signed    [place] LICENSE: [id number]

## 2022-03-23 ENCOUNTER — OFFICE VISIT (OUTPATIENT)
Dept: FAMILY MEDICINE CLINIC | Facility: CLINIC | Age: 52
End: 2022-03-23

## 2022-03-23 VITALS
HEART RATE: 84 BPM | DIASTOLIC BLOOD PRESSURE: 53 MMHG | BODY MASS INDEX: 23.79 KG/M2 | HEIGHT: 68 IN | WEIGHT: 157 LBS | SYSTOLIC BLOOD PRESSURE: 113 MMHG | OXYGEN SATURATION: 97 %

## 2022-03-23 DIAGNOSIS — Z13.220 SCREENING FOR LIPID DISORDERS: ICD-10-CM

## 2022-03-23 DIAGNOSIS — M25.50 ARTHRALGIA, UNSPECIFIED JOINT: ICD-10-CM

## 2022-03-23 DIAGNOSIS — G47.00 INSOMNIA, UNSPECIFIED TYPE: ICD-10-CM

## 2022-03-23 DIAGNOSIS — E53.8 VITAMIN B12 DEFICIENCY: ICD-10-CM

## 2022-03-23 DIAGNOSIS — R53.83 OTHER FATIGUE: ICD-10-CM

## 2022-03-23 DIAGNOSIS — E55.9 VITAMIN D DEFICIENCY: ICD-10-CM

## 2022-03-23 DIAGNOSIS — E03.9 HYPOTHYROIDISM, UNSPECIFIED TYPE: Primary | ICD-10-CM

## 2022-03-23 DIAGNOSIS — Z01.89 ROUTINE LAB DRAW: ICD-10-CM

## 2022-03-23 PROCEDURE — 99214 OFFICE O/P EST MOD 30 MIN: CPT | Performed by: NURSE PRACTITIONER

## 2022-03-23 NOTE — PROGRESS NOTES
"Chief Complaint  Fatigue (Since November. ), Insomnia, Dizziness, and Pain all over (Tenderness on left side of head, tenderness to right side of neck, memory lapse)    Subjective          Mercy Dalton presents to [facility]  History of Present Illness     lack of energy-gets tired more easily x few months around Nov    Insomnia-tough sometimes to get to sleep and hard to stay asleep throughout the night x 6 wks, just started melatonin with moderate success    Vertigo-after PT ended no further occurrences then moderately bad dizziness episodes return 2-3 wks ago, plan to  home exercises.    General pain/stiffness all over body -increased pain/discomfort across both shoulder, left neck to shoulder muscle, right hip/buttock area which which she feels mostly when turning over In bed and right wrist. No obvious trauma for any of these. Started 1m ago.    Tenderness left side crown of head sometimes shifting to the back-center of head-noticed first 1m or so ago. Thought it was from wearing a hair clip but tenderness in a different spot.     Tenderness right side of neck, slight sensitivity when swallowing, noticed Monday    Memory lapses happening more frequently since the last few months father suffered from dementia.     She  has a past medical history of Allergic rhinitis, Anemia, Exercise-induced asthma, HGSIL (high grade squamous intraepithelial lesion) on Pap smear of cervix, Hypothyroid, Lumbar stenosis, Neurogenic claudication (HCC), RLS (restless legs syndrome) (07/12/2018), and Vertigo.    She has no past medical history of Hard to intubate, Malignant hyperthermia due to anesthesia, PONV (postoperative nausea and vomiting), or Spinal headache.     Objective   Vital Signs:   /53   Pulse 84   Ht 172.7 cm (68\")   Wt 71.2 kg (157 lb)   SpO2 97%   BMI 23.87 kg/m²     Physical Exam  Constitutional:       Appearance: Normal appearance.   HENT:      Right Ear: " Hearing, tympanic membrane, ear canal and external ear normal.      Left Ear: Hearing, tympanic membrane, ear canal and external ear normal.   Neck:      Thyroid: No thyroid mass or thyromegaly.      Vascular: No carotid bruit.   Cardiovascular:      Rate and Rhythm: Normal rate and regular rhythm.      Pulses: Normal pulses.      Heart sounds: Normal heart sounds.   Pulmonary:      Effort: Pulmonary effort is normal.      Breath sounds: Normal breath sounds.   Musculoskeletal:      Right lower leg: No edema.      Left lower leg: No edema.   Skin:     General: Skin is warm and dry.      Comments: TTP over midscalp and crown   Neurological:      General: No focal deficit present.      Mental Status: She is alert and oriented to person, place, and time.      GCS: GCS eye subscore is 4. GCS verbal subscore is 5. GCS motor subscore is 6.   Psychiatric:         Mood and Affect: Mood normal.         Behavior: Behavior normal.        Result Review :   The following data was reviewed by: JOSEPH Chiu on 03/23/2022:  CMP    CMP 4/7/21   Glucose 96   BUN 15   Creatinine 0.82   Sodium 139   Potassium 4.8   Chloride 105   Calcium 9.4   Albumin 4.1   Total Bilirubin 0.42   Alkaline Phosphatase 39 (A)   AST (SGOT) 37   ALT (SGPT) 13   (A) Abnormal value            CBC    CBC 4/7/21 4/19/21   WBC 6.20 6.65   RBC 4.20 4.05 (A)   Hemoglobin 13.4 12.9   Hematocrit 40.8 39.5   MCV 97.1 97.5   MCH 31.9 (A) 31.9 (A)   MCHC 32.8 (A) 32.7 (A)   RDW 12.4 12.4   Platelets 283 279   (A) Abnormal value            CBC w/diff    CBC w/Diff 4/7/21 4/19/21   WBC 6.20 6.65   RBC 4.20 4.05 (A)   Hemoglobin 13.4 12.9   Hematocrit 40.8 39.5   MCV 97.1 97.5   MCH 31.9 (A) 31.9 (A)   MCHC 32.8 (A) 32.7 (A)   RDW 12.4 12.4   Platelets 283 279   Neutrophil Rel % 51.6 41.3   Lymphocyte Rel % 37.1 49.0 (A)   Monocyte Rel % 7.6 7.5   Eosinophil Rel % 1.9 0.8   Basophil Rel % 1.5 1.2   (A) Abnormal value            Lipid Panel    Lipid Panel  4/7/21   Total Cholesterol 181   Triglycerides 49   HDL Cholesterol 69 (A)   VLDL Cholesterol 10   LDL Cholesterol  102 (A)   (A) Abnormal value       Comments are available for some flowsheets but are not being displayed.           TSH    TSH 4/7/21   TSH 2.290               Data reviewed: Radiologic studies MRI brain          Past Surgical History:   Procedure Laterality Date   • COLONOSCOPY  02/21/2020    POLYPS, INTERNAL HEMORRHOIDS   • DIAGNOSTIC LAPAROSCOPY     • ECTOPIC PREGNANCY  1997,1999    REMOVAL    • ENDOMETRIAL ABLATION W/ NOVASURE     • LASER ABLATION  2012    ABNORMAL PERIODS/ DR. PRAJAPATI   • LEEP N/A 4/3/2017    Procedure: LOOP ELECTROCAUTERY EXCISION PROCEDURE;  Surgeon: Oly Rivera MD;  Location: [facility] OR [facility] [place];  Service:    • LUMBAR LAMINECTOMY Left 11/10/2021    Procedure: MINIMALLY INVASIVE LUMBAR LAMINECTOMY, LEFT APPROACH, LUMBAR 3-LUMBAR 4, LUMBAR 4-LUMBAR 5;  Surgeon: David Duran MD;  Location: [facility];  Service: Neurosurgery;  Laterality: Left;   • OTHER SURGICAL HISTORY Left     PARTIAL AMPUTATION OF LEFT INDEX FINGER      Family History   Problem Relation Age of Onset   • Liver disease Mother    • Diabetes Father         MELLITUS   • Skin cancer Maternal Grandmother         UKNOWN SKIN CANCER   • Skin cancer Other         BASAL CELL    • Stroke Other         Current Outpatient Medications:   •  ascorbic acid (VITAMIN C) 1000 MG tablet, Take 1,000 mg by mouth Daily., Disp: , Rfl:   •  calcium carbonate (OS-KALPANA) 600 MG tablet, Take 600 mg by mouth Daily., Disp: , Rfl:   •  Cholecalciferol (Vitamin D) 50 MCG (2000 UT) capsule, Take 2,000 Units by mouth Daily., Disp: , Rfl:   •  levothyroxine (SYNTHROID, LEVOTHROID) 25 MCG tablet, Take 1 tablet by mouth Daily., Disp: 30 tablet, Rfl: 0  •  vitamin B-12 (CYANOCOBALAMIN) 2500 MCG sublingual tablet tablet, Place 2,500 mcg under the tongue Daily., Disp: , Rfl:    Assessment and Plan    Diagnoses and all orders for this  visit:    1. Hypothyroidism, unspecified type (Primary)  -     TSH; Future    2. Screening for lipid disorders  -     Lipid Panel; Future    3. Vitamin B12 deficiency  -     Vitamin B12; Future    4. Vitamin D deficiency  -     Vitamin D 25 Hydroxy; Future    5. Routine lab draw  -     CBC & Differential; Future  -     Comprehensive Metabolic Panel; Future    6. Other fatigue    7. Insomnia, unspecified type  -     Estrogens, Total; Future  -     FSH & LH  -     Estradiol; Future  -     Progesterone; Future    8. Arthralgia, unspecified joint  -     Uric Acid; Future  -     Cyclic Citrul Peptide Antibody, IgG / IgA; Future  -     Rheumatoid Factor; Future  -     AURELIANO; Future  -     C-reactive Protein; Future  -     Sedimentation Rate; Future        Follow Up   Return if symptoms worsen or fail to improve.  Patient was given instructions and counseling regarding her condition or for health maintenance advice. Please see specific information pulled into the AVS if appropriate.     Mercy ADAM Sha  reports that she has never smoked. She has never used smokeless tobacco..            JOSEPH Cihu    The patient is advised to continue current medications.

## 2022-03-24 ENCOUNTER — LAB (OUTPATIENT)
Dept: LAB | Facility: HOSPITAL | Age: 52
End: 2022-03-24

## 2022-03-24 DIAGNOSIS — G47.00 INSOMNIA, UNSPECIFIED TYPE: ICD-10-CM

## 2022-03-24 DIAGNOSIS — Z01.89 ROUTINE LAB DRAW: ICD-10-CM

## 2022-03-24 DIAGNOSIS — E03.9 HYPOTHYROIDISM, UNSPECIFIED TYPE: ICD-10-CM

## 2022-03-24 DIAGNOSIS — E55.9 VITAMIN D DEFICIENCY: ICD-10-CM

## 2022-03-24 DIAGNOSIS — Z13.220 SCREENING FOR LIPID DISORDERS: ICD-10-CM

## 2022-03-24 DIAGNOSIS — E53.8 VITAMIN B12 DEFICIENCY: ICD-10-CM

## 2022-03-24 DIAGNOSIS — M25.50 ARTHRALGIA, UNSPECIFIED JOINT: ICD-10-CM

## 2022-03-24 LAB
25(OH)D3 SERPL-MCNC: 50.7 NG/ML (ref 30–100)
ALBUMIN SERPL-MCNC: 4.4 G/DL (ref 3.5–5.2)
ALBUMIN/GLOB SERPL: 1.3 G/DL
ALP SERPL-CCNC: 45 U/L (ref 39–117)
ALT SERPL W P-5'-P-CCNC: 20 U/L (ref 1–33)
ANION GAP SERPL CALCULATED.3IONS-SCNC: 11.9 MMOL/L (ref 5–15)
AST SERPL-CCNC: 38 U/L (ref 1–32)
BASOPHILS # BLD AUTO: 0.08 10*3/MM3 (ref 0–0.2)
BASOPHILS NFR BLD AUTO: 1.6 % (ref 0–1.5)
BILIRUB SERPL-MCNC: 0.4 MG/DL (ref 0–1.2)
BUN SERPL-MCNC: 14 MG/DL (ref 6–20)
BUN/CREAT SERPL: 17.5 (ref 7–25)
CALCIUM SPEC-SCNC: 10 MG/DL (ref 8.6–10.5)
CHLORIDE SERPL-SCNC: 102 MMOL/L (ref 98–107)
CHOLEST SERPL-MCNC: 228 MG/DL (ref 0–200)
CHROMATIN AB SERPL-ACNC: 15 IU/ML (ref 0–14)
CO2 SERPL-SCNC: 27.1 MMOL/L (ref 22–29)
CREAT SERPL-MCNC: 0.8 MG/DL (ref 0.57–1)
CRP SERPL-MCNC: <0.3 MG/DL (ref 0–0.5)
DEPRECATED RDW RBC AUTO: 39.6 FL (ref 37–54)
EGFRCR SERPLBLD CKD-EPI 2021: 87.7 ML/MIN/1.73
EOSINOPHIL # BLD AUTO: 0.1 10*3/MM3 (ref 0–0.4)
EOSINOPHIL NFR BLD AUTO: 2 % (ref 0.3–6.2)
ERYTHROCYTE [DISTWIDTH] IN BLOOD BY AUTOMATED COUNT: 11.7 % (ref 12.3–15.4)
ERYTHROCYTE [SEDIMENTATION RATE] IN BLOOD: 13 MM/HR (ref 0–30)
ESTRADIOL SERPL HS-MCNC: 6.5 PG/ML
FSH SERPL-ACNC: 115 MIU/ML
GLOBULIN UR ELPH-MCNC: 3.4 GM/DL
GLUCOSE SERPL-MCNC: 98 MG/DL (ref 65–99)
HCT VFR BLD AUTO: 41.2 % (ref 34–46.6)
HDLC SERPL-MCNC: 64 MG/DL (ref 40–60)
HGB BLD-MCNC: 13.7 G/DL (ref 12–15.9)
IMM GRANULOCYTES # BLD AUTO: 0.02 10*3/MM3 (ref 0–0.05)
IMM GRANULOCYTES NFR BLD AUTO: 0.4 % (ref 0–0.5)
LDLC SERPL CALC-MCNC: 152 MG/DL (ref 0–100)
LDLC/HDLC SERPL: 2.35 {RATIO}
LH SERPL-ACNC: 56 MIU/ML
LYMPHOCYTES # BLD AUTO: 1.88 10*3/MM3 (ref 0.7–3.1)
LYMPHOCYTES NFR BLD AUTO: 37 % (ref 19.6–45.3)
MCH RBC QN AUTO: 30.8 PG (ref 26.6–33)
MCHC RBC AUTO-ENTMCNC: 33.3 G/DL (ref 31.5–35.7)
MCV RBC AUTO: 92.6 FL (ref 79–97)
MONOCYTES # BLD AUTO: 0.45 10*3/MM3 (ref 0.1–0.9)
MONOCYTES NFR BLD AUTO: 8.9 % (ref 5–12)
NEUTROPHILS NFR BLD AUTO: 2.55 10*3/MM3 (ref 1.7–7)
NEUTROPHILS NFR BLD AUTO: 50.1 % (ref 42.7–76)
NRBC BLD AUTO-RTO: 0 /100 WBC (ref 0–0.2)
PLATELET # BLD AUTO: 309 10*3/MM3 (ref 140–450)
PMV BLD AUTO: 11 FL (ref 6–12)
POTASSIUM SERPL-SCNC: 4.4 MMOL/L (ref 3.5–5.2)
PROGEST SERPL-MCNC: 0.21 NG/ML
PROT SERPL-MCNC: 7.8 G/DL (ref 6–8.5)
RBC # BLD AUTO: 4.45 10*6/MM3 (ref 3.77–5.28)
SODIUM SERPL-SCNC: 141 MMOL/L (ref 136–145)
TRIGL SERPL-MCNC: 68 MG/DL (ref 0–150)
TSH SERPL DL<=0.05 MIU/L-ACNC: 2 UIU/ML (ref 0.27–4.2)
URATE SERPL-MCNC: 4.7 MG/DL (ref 2.4–5.7)
VIT B12 BLD-MCNC: 1227 PG/ML (ref 211–946)
VLDLC SERPL-MCNC: 12 MG/DL (ref 5–40)
WBC NRBC COR # BLD: 5.08 10*3/MM3 (ref 3.4–10.8)

## 2022-03-24 PROCEDURE — 86140 C-REACTIVE PROTEIN: CPT

## 2022-03-24 PROCEDURE — 86038 ANTINUCLEAR ANTIBODIES: CPT

## 2022-03-24 PROCEDURE — 85652 RBC SED RATE AUTOMATED: CPT

## 2022-03-24 PROCEDURE — 36415 COLL VENOUS BLD VENIPUNCTURE: CPT

## 2022-03-24 PROCEDURE — 86225 DNA ANTIBODY NATIVE: CPT

## 2022-03-24 PROCEDURE — 84144 ASSAY OF PROGESTERONE: CPT

## 2022-03-24 PROCEDURE — 86431 RHEUMATOID FACTOR QUANT: CPT

## 2022-03-24 PROCEDURE — 83002 ASSAY OF GONADOTROPIN (LH): CPT | Performed by: NURSE PRACTITIONER

## 2022-03-24 PROCEDURE — 86200 CCP ANTIBODY: CPT

## 2022-03-24 PROCEDURE — 82672 ASSAY OF ESTROGEN: CPT

## 2022-03-24 PROCEDURE — 82670 ASSAY OF TOTAL ESTRADIOL: CPT

## 2022-03-24 PROCEDURE — 80061 LIPID PANEL: CPT

## 2022-03-24 PROCEDURE — 83001 ASSAY OF GONADOTROPIN (FSH): CPT | Performed by: NURSE PRACTITIONER

## 2022-03-24 PROCEDURE — 82306 VITAMIN D 25 HYDROXY: CPT

## 2022-03-24 PROCEDURE — 80050 GENERAL HEALTH PANEL: CPT

## 2022-03-24 PROCEDURE — 82607 VITAMIN B-12: CPT

## 2022-03-24 PROCEDURE — 84550 ASSAY OF BLOOD/URIC ACID: CPT

## 2022-03-25 ENCOUNTER — TELEPHONE (OUTPATIENT)
Dept: FAMILY MEDICINE CLINIC | Facility: CLINIC | Age: 52
End: 2022-03-25

## 2022-03-25 NOTE — TELEPHONE ENCOUNTER
Let her know she may want to quarter them to take 200mg-I can send in a new script if we have an updated uds

## 2022-03-25 NOTE — TELEPHONE ENCOUNTER
Phoned patient told results, she states that her restless leg is coming back, she states it is mild at this time she has been on Requip and Gabapentin in the past. The patient states that she has Gabapentin 800 mg at home, she wants to know if she can cut these in 1/2 for 400 mg to take for her restless leg.

## 2022-03-26 LAB
CCP IGA+IGG SERPL IA-ACNC: 8 UNITS (ref 0–19)
DSDNA IGG SERPL IA-ACNC: NEGATIVE [IU]/ML
NUCLEAR IGG SER IA-RTO: NEGATIVE

## 2022-03-27 LAB — ESTROGEN SERPL-MCNC: 81 PG/ML

## 2022-03-28 ENCOUNTER — TREATMENT (OUTPATIENT)
Dept: PHYSICAL THERAPY | Facility: CLINIC | Age: 52
End: 2022-03-28

## 2022-03-28 DIAGNOSIS — M25.642 STIFFNESS OF FINGER JOINT OF LEFT HAND: ICD-10-CM
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
## 2022-03-28 DIAGNOSIS — S68.611D: ICD-10-CM

## 2022-03-28 DIAGNOSIS — M79.645 PAIN OF FINGER OF LEFT HAND: ICD-10-CM

## 2022-03-28 DIAGNOSIS — S68.111A AMPUTATION OF LEFT INDEX FINGER: Primary | ICD-10-CM

## 2022-03-28 DIAGNOSIS — S61.452A DOG BITE OF LEFT HAND, INITIAL ENCOUNTER: ICD-10-CM

## 2022-03-28 PROCEDURE — 97140 MANUAL THERAPY 1/> REGIONS: CPT | Performed by: OCCUPATIONAL THERAPIST

## 2022-03-28 PROCEDURE — 97112 NEUROMUSCULAR REEDUCATION: CPT | Performed by: OCCUPATIONAL THERAPIST

## 2022-03-28 PROCEDURE — 97110 THERAPEUTIC EXERCISES: CPT | Performed by: OCCUPATIONAL THERAPIST

## 2022-03-28 NOTE — PROGRESS NOTES
Re-Assessment / Re-Certification      Patient: Mercy Dalton   : 1970  Diagnosis/ICD-10 Code:  Amputation of left index finger [S68.111A]  Referring practitioner: Leny Jerry MD  Date of Initial Visit: Type: THERAPY  Noted: 2021  Today's Date: 3/28/2022  Patient seen for 27 sessions      Subjective:   Mercy Dalton reports: I think it is getting better.  I am not as stiff as it was.  Sensitivity is improving  Subjective Questionnaire: QuickDASH: 13  Clinical Progress: improved  Home Program Compliance: Yes  Treatment has included: therapeutic exercise, neuromuscular re-education, manual therapy and therapeutic activity      Objective   IF  0-90  0-90  Amp    MF  0-90  0-90  0-65    #35    Assessment/Plan    Visit Diagnoses:    ICD-10-CM ICD-9-CM   1. Amputation of left index finger  S68.111A 886.0   2. Dog bite of left hand, initial encounter  S61.452A 882.0    W54.0XXA E906.0   3. Stiffness of finger joint of left hand  M25.642 719.54   4. Pain of finger of left hand  M79.645 729.5   5. 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 The patient complains of pain in the L hand                  LTG 1: 12 weeks:  The patient will report a pain rating of 0/10 or better in order to improve sleep quality and tolerance to performance of activities of daily living.                                  STATUS:  MET                  STG 1a: 4weeks:  The patient will report a pain rating of 1/10 or better.                                   STATUS:  MET  2. The patient has limited ROM of the L IF and LF                  LTG 2: 12 weeks:  The patient will demonstrate 200 degrees of L IF and 240 LF to allow the patient to  and be able to type.                                  STATUS:  MET                   STG 2a: 4 weeks:  The patient will demonstrate 160 degrees of RIOS  of IF and LF.                                  STATUS:  MET                          3. The patient has limited strength of the L UE.                  LTG 3: 12 weeks:  The patient will demonstrate 40# in order to return to PLOF.                                  STATUS:  NOT MET                  STG 3a: 4 weeks:  The patient will demonstrate tolerance to light strengthening without adverse reaction.                                  STATUS:  MET  4. Carrying, Moving, and Handling Objects Functional Limitation                                   LTG 4: 12 weeks:  The patient will demonstrate 0% limitation by achieving a score of 11 on the Quick DASH.                                  STATUS:  NOT MET                  STG 4a: 4 weeks:  The patient will demonstrate 20-39% limitation by achieving a score of 25 on the Quick DASH.                                    STATUS:  MET      Recommendations: Continue as planned  Timeframe: 1 month  Prognosis to achieve goals: good      OT SIGNATURE: Sabra Markham OTTODD, OTR/L, CHT     Electronically signed    [place] LICENSE: [id number]   DATE TREATMENT INITIATED: 3/28/2022      90 Day Recertification  Certification Period: 3/28/2022 thru 6/25/2022  I certify that the therapy services are furnished while this patient is under my care.  The services outlined above are required by this patient, and will be reviewed every 90 days.      Based upon review of the patient's progress and continued therapy plan, it is my medical opinion that Mercy Dalton should continue occupational therapy treatment at [facility]  [address]  [place] [address] [phone].    Signature: __________________________________  PHYSICIAN: Leny Jerry MD  NPI: [id number]                                      DATE:      Timed:  Manual Therapy:    10     mins  21454;  Therapeutic Exercise:    10     mins  42005;  Therapeutic Activity:    0     mins  38518;      Neuromuscular Waldo:    10    mins  35795;    Ultrasound:     0     mins  52726;    Electrical Stimulation:    0     mins  09578;    Untimed:  Electrical Stimulation:    0     mins  91594 ( );  Fluidotherapy:        0    mins  30380  Paraffin:                          0    mins  18378    Timed Treatment:   30   mins   Total Treatment:     30   mins

## 2022-03-28 NOTE — TELEPHONE ENCOUNTER
Patient was notified on Friday of results and instructions, she will try the medication to see if it helps and then she will call back if she needs a refill

## 2022-03-31 ENCOUNTER — TELEPHONE (OUTPATIENT)
Dept: FAMILY MEDICINE CLINIC | Facility: CLINIC | Age: 52
End: 2022-03-31

## 2022-03-31 ENCOUNTER — LAB (OUTPATIENT)
Dept: FAMILY MEDICINE CLINIC | Facility: CLINIC | Age: 52
End: 2022-03-31

## 2022-03-31 DIAGNOSIS — G62.9 NEUROPATHY: Primary | ICD-10-CM

## 2022-03-31 DIAGNOSIS — M05.80 POLYARTHRITIS WITH POSITIVE RHEUMATOID FACTOR: ICD-10-CM

## 2022-03-31 DIAGNOSIS — Z79.899 LONG TERM USE OF DRUG: Primary | ICD-10-CM

## 2022-03-31 DIAGNOSIS — R76.8 POSITIVE ANA (ANTINUCLEAR ANTIBODY): Primary | ICD-10-CM

## 2022-03-31 PROCEDURE — 80305 DRUG TEST PRSMV DIR OPT OBS: CPT | Performed by: NURSE PRACTITIONER

## 2022-03-31 RX ORDER — GABAPENTIN 100 MG/1
200 CAPSULE ORAL NIGHTLY
Qty: 180 CAPSULE | Refills: 0 | Status: SHIPPED | OUTPATIENT
Start: 2022-03-31 | End: 2022-06-30 | Stop reason: SDUPTHER

## 2022-03-31 NOTE — TELEPHONE ENCOUNTER
Told patient results and instructions. Order placed for Rheumatology referral.The patient states that the 1/4 Neurontin is helping her, she states she will need a prescription. I advised her that she will need to sign a narcotic consent form and needs a UDS. We will need to obtain a Trever for the patient at time of refill. She states she will come in today or tomorrow to get this done.

## 2022-03-31 NOTE — TELEPHONE ENCOUNTER
----- Message from JOSEPH Chiu sent at 3/30/2022  7:49 AM EDT -----  AURELIANO neg-no autoimmune disease. Creactive protein neg for inflammation. Chol panel shows elevated chol and ldl but her hdl is high which helps fight the bad chol. CMP normal with exception of sl elevated AST-monitor intake of tylenol and alcohol. Uric acid neg for gout. Her Rheumatoid Factor is minimally elevated but her CCP AB neg-schedule with rheumatology to r/o RA. Vit b12 elevated she can switch to taking vit b12 qod. Vit D, tsh WNL. Estradiol and Progesterone levels low but not completely postmenopausal level

## 2022-04-04 DIAGNOSIS — E03.9 HYPOTHYROIDISM, UNSPECIFIED TYPE: ICD-10-CM

## 2022-04-04 RX ORDER — LEVOTHYROXINE SODIUM 0.03 MG/1
25 TABLET ORAL DAILY
Qty: 90 TABLET | Refills: 1 | Status: SHIPPED | OUTPATIENT
Start: 2022-04-04 | End: 2022-12-08 | Stop reason: SDUPTHER

## 2022-04-05 ENCOUNTER — TREATMENT (OUTPATIENT)
Dept: PHYSICAL THERAPY | Facility: CLINIC | Age: 52
End: 2022-04-05
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
## 2022-04-05 DIAGNOSIS — S61.452A DOG BITE OF LEFT HAND, INITIAL ENCOUNTER: ICD-10-CM

## 2022-04-05 DIAGNOSIS — M25.642 STIFFNESS OF FINGER JOINT OF LEFT HAND: ICD-10-CM

## 2022-04-05 DIAGNOSIS — M79.645 PAIN OF FINGER OF LEFT HAND: ICD-10-CM

## 2022-04-05 DIAGNOSIS — S68.611D: ICD-10-CM

## 2022-04-05 DIAGNOSIS — S68.111A AMPUTATION OF LEFT INDEX FINGER: Primary | ICD-10-CM

## 2022-04-05 PROCEDURE — 97530 THERAPEUTIC ACTIVITIES: CPT | Performed by: OCCUPATIONAL THERAPIST

## 2022-04-05 PROCEDURE — 97112 NEUROMUSCULAR REEDUCATION: CPT | Performed by: OCCUPATIONAL THERAPIST

## 2022-04-05 PROCEDURE — 97110 THERAPEUTIC EXERCISES: CPT | Performed by: OCCUPATIONAL THERAPIST

## 2022-04-05 NOTE — PROGRESS NOTES
Occupational Therapy Daily Treatment Note      Patient: Mercy Dalton   : 1970  Referring practitioner: Leny Jerry MD  Date of Initial Visit: Type: THERAPY  Noted: 2021  Today's Date: 2022  Patient seen for 28 sessions    ICD-10-CM ICD-9-CM   1. Amputation of left index finger  S68.111A 886.0   2. Dog bite of left hand, initial encounter  S61.452A 882.0    W54.0XXA E906.0   3. Stiffness of finger joint of left hand  M25.642 719.54   4. Pain of finger of left hand  M79.645 729.5   5. 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   0-95  0-90  Amp    45#    Assessment/Plan  Plan Goals: 1. The patient complains of pain in the L hand                  LTG 1: 12 weeks:  The patient will report a pain rating of 0/10 or better in order to improve sleep quality and tolerance to performance of activities of daily living.                                  STATUS:  MET                  STG 1a: 4weeks:  The patient will report a pain rating of 1/10 or better.                                   STATUS:  MET  2. The patient has limited ROM of the L IF and LF                  LTG 2: 12 weeks:  The patient will demonstrate 200 degrees of L IF and 240 LF to allow the patient to  and be able to type.                                  STATUS:  MET                   STG 2a: 4 weeks:  The patient will demonstrate 160 degrees of RIOS of IF and LF.                                  STATUS:  MET                          3. The patient has limited strength of the L UE.                  LTG 3: 12 weeks:  The patient will demonstrate 40# in order to return to PLOF.                                  STATUS:  MET                  STG 3a: 4 weeks:  The patient will demonstrate tolerance to light strengthening  without adverse reaction.                                  STATUS:  MET  4. Carrying, Moving, and Handling Objects Functional Limitation                                   LTG 4: 12 weeks:  The patient will demonstrate 0% limitation by achieving a score of 11 on the Quick DASH.                                  STATUS:  NOT MET                  STG 4a: 4 weeks:  The patient will demonstrate 20-39% limitation by achieving a score of 25 on the Quick DASH.                                    STATUS:  MET      D/C to HEP           Timed:  Manual Therapy:    0     mins  85057;  Therapeutic Exercise:    10     mins  32226;  Therapeutic Activity:    10     mins  54959;     Neuromuscular Waldo:    10    mins  82562;    Ultrasound:     0     mins  42564;    Electrical Stimulation:    0     mins  14511;    Untimed:  Electrical Stimulation:    0     mins  28608 ( );  Fluidotherapy:        0    mins  78063  Paraffin:                          0    mins  95191    Timed Treatment:   30   mins   Total Treatment:     30   mins    OT SIGNATURE: RONNIE Foster, OTR/L, CHT     Electronically signed    [place] LICENSE: [id number]

## 2022-06-30 ENCOUNTER — TELEPHONE (OUTPATIENT)
Dept: FAMILY MEDICINE CLINIC | Facility: CLINIC | Age: 52
End: 2022-06-30

## 2022-06-30 DIAGNOSIS — G62.9 NEUROPATHY: ICD-10-CM

## 2022-06-30 RX ORDER — GABAPENTIN 100 MG/1
200 CAPSULE ORAL NIGHTLY
Qty: 180 CAPSULE | Refills: 0 | Status: SHIPPED | OUTPATIENT
Start: 2022-06-30 | End: 2022-09-08 | Stop reason: DRUGHIGH

## 2022-06-30 NOTE — TELEPHONE ENCOUNTER
Caller: Mercy Dalton    Relationship: Self    Best call back number: [phone]    Requested Prescriptions:   Requested Prescriptions     Pending Prescriptions Disp Refills   • gabapentin (Neurontin) 100 MG capsule 180 capsule 0     Sig: Take 2 capsules by mouth Every Night.        Pharmacy where request should be sent: [facility] [place]       Does the patient have less than a 3 day supply:  [] Yes  [x] No    Oneida Mcnally   06/30/22 08:22 EDT

## 2022-06-30 NOTE — TELEPHONE ENCOUNTER
RF request for Gabapentin 100 mg 2 tab QHS #180 0 RF to [facility]    LRX 3/31/22    UDS 3/31/22    OV 3/23/22    F/U None

## 2022-07-05 RX ORDER — TRIAMCINOLONE ACETONIDE 1 MG/G
1 CREAM TOPICAL 2 TIMES DAILY
COMMUNITY
End: 2022-07-05 | Stop reason: SDUPTHER

## 2022-07-05 RX ORDER — TRIAMCINOLONE ACETONIDE 1 MG/G
1 CREAM TOPICAL AS NEEDED
Qty: 45 G | Refills: 0 | Status: SHIPPED | OUTPATIENT
Start: 2022-07-05

## 2022-07-05 NOTE — TELEPHONE ENCOUNTER
----- Message from Mercy Dalton sent at 2022  7:39 AM EDT -----  Regarding: New Prescriptions Needed  GM!  I need prescriptions for 2 topical creams I use as needed:     -Triamcinolone Acet 0.1% Cream, used as needed for itching  -Mupirocin 2% Ointment, used as needed as an antibacterial cream  Both prescriptions I have  (I don't use them often since they both are used as needed.)      Please send new prescriptions for both to my pharmacy here at the hospital.  If any questions, please call me at [phone].  Thank you!

## 2022-08-17 ENCOUNTER — LAB (OUTPATIENT)
Dept: LAB | Facility: HOSPITAL | Age: 52
End: 2022-08-17

## 2022-08-17 ENCOUNTER — TRANSCRIBE ORDERS (OUTPATIENT)
Dept: ADMINISTRATIVE | Facility: HOSPITAL | Age: 52
End: 2022-08-17

## 2022-08-17 DIAGNOSIS — Z79.899 ENCOUNTER FOR LONG-TERM (CURRENT) USE OF OTHER MEDICATIONS: ICD-10-CM

## 2022-08-17 DIAGNOSIS — K76.89 COMPENSATORY LOBAR HYPERPLASIA OF LIVER: ICD-10-CM

## 2022-08-17 DIAGNOSIS — Z11.59 SCREENING EXAMINATION FOR POLIOMYELITIS: ICD-10-CM

## 2022-08-17 DIAGNOSIS — Z11.59 SCREENING EXAMINATION FOR POLIOMYELITIS: Primary | ICD-10-CM

## 2022-08-17 LAB
ERYTHROCYTE [SEDIMENTATION RATE] IN BLOOD: 1 MM/HR (ref 0–30)
FERRITIN SERPL-MCNC: 127 NG/ML (ref 13–150)
HAV IGM SERPL QL IA: NORMAL
HBV CORE IGM SERPL QL IA: NORMAL
HBV SURFACE AG SERPL QL IA: NORMAL
HCV AB SER DONR QL: NORMAL

## 2022-08-17 PROCEDURE — 82728 ASSAY OF FERRITIN: CPT

## 2022-08-17 PROCEDURE — 80074 ACUTE HEPATITIS PANEL: CPT

## 2022-08-17 PROCEDURE — 36415 COLL VENOUS BLD VENIPUNCTURE: CPT

## 2022-08-17 PROCEDURE — 85652 RBC SED RATE AUTOMATED: CPT

## 2022-09-08 ENCOUNTER — OFFICE VISIT (OUTPATIENT)
Dept: FAMILY MEDICINE CLINIC | Facility: CLINIC | Age: 52
End: 2022-09-08

## 2022-09-08 VITALS
BODY MASS INDEX: 23.31 KG/M2 | SYSTOLIC BLOOD PRESSURE: 93 MMHG | HEIGHT: 68 IN | OXYGEN SATURATION: 99 % | DIASTOLIC BLOOD PRESSURE: 54 MMHG | HEART RATE: 66 BPM | WEIGHT: 153.8 LBS

## 2022-09-08 DIAGNOSIS — G62.9 NEUROPATHY: ICD-10-CM

## 2022-09-08 DIAGNOSIS — Z00.00 HEALTHCARE MAINTENANCE: ICD-10-CM

## 2022-09-08 DIAGNOSIS — J30.2 SEASONAL ALLERGIES: ICD-10-CM

## 2022-09-08 DIAGNOSIS — Z79.899 LONG TERM USE OF DRUG: Primary | ICD-10-CM

## 2022-09-08 PROCEDURE — 80305 DRUG TEST PRSMV DIR OPT OBS: CPT | Performed by: NURSE PRACTITIONER

## 2022-09-08 PROCEDURE — 99213 OFFICE O/P EST LOW 20 MIN: CPT | Performed by: NURSE PRACTITIONER

## 2022-09-08 RX ORDER — GABAPENTIN 100 MG/1
300 CAPSULE ORAL NIGHTLY
Qty: 90 CAPSULE | Refills: 1 | Status: CANCELLED | OUTPATIENT
Start: 2022-09-08

## 2022-09-08 RX ORDER — CETIRIZINE HYDROCHLORIDE 10 MG/1
10 TABLET ORAL DAILY
Qty: 90 TABLET | Refills: 1 | Status: SHIPPED | OUTPATIENT
Start: 2022-09-08 | End: 2022-12-08

## 2022-09-08 RX ORDER — GABAPENTIN 300 MG/1
300 CAPSULE ORAL DAILY
Qty: 90 CAPSULE | Refills: 0 | Status: SHIPPED | OUTPATIENT
Start: 2022-09-08 | End: 2022-12-08 | Stop reason: SDUPTHER

## 2022-09-08 RX ORDER — MULTIPLE VITAMINS W/ MINERALS TAB 9MG-400MCG
1 TAB ORAL DAILY
COMMUNITY
Start: 2000-01-01

## 2022-09-08 NOTE — PROGRESS NOTES
Answers for HPI/ROS submitted by the patient on 9/6/2022  Please describe your symptoms.: Routine OV r/t Gabapentin Rx for restless leg syndrome.  Will need new Rx as current dosage is 300MG and not the prescribed 200MG.  Have you had these symptoms before?: Yes  How long have you been having these symptoms?: Greater than 2 weeks  Please list any medications you are currently taking for this condition.: Gabapentin  Please describe any probable cause for these symptoms. : RLS  What is the primary reason for your visit?: Other    Chief Complaint  Sore Throat    SUBJECTIVE  Mercy Dalton presents to [facility].     Mercy Dalton is a 51-year-old female who presents today for follow-up and medication refill.    Neuropathy-request rf of neurontin. Trever done today. UDS 9/8/22. NC 3/31/22.    Sore throat - She was seen 4 weeks ago for a sore throat. She tested herself for COVID which was negative. The sore throat eventually led to tonsillitis and laryngitis which resolved quickly. She then developed a tickle in the back of her throat which led to a dry cough for 2 to 3 days. The sore throat resolved on 09/06/2022. She has not tried any antihistamines. Her nose is clear and she sneezes on occasion. She denies any fever, chills, or strep throat.    Osteoporosis - She was seen by Dr. Ariza, rheumatologist, who suggested she take a multivitamin and calcium. She is active and eats a good diet.    Hypertension - Her blood pressure today is 93/54 mm Hg which is normal for her.    Hypothyroidism - She continues to take Synthroid 25 mcg daily.    Hip pain - She continues to have pain in her hip which is likely bursitis. She was given anti-inflammatory medications and home exercises to control the pain with Tylenol or ibuprofen. She has not started the exercises yet.    Health maintenance - She is scheduled for a mammogram on 01/04/2023. She is overdue for a Pap smear as she has a history of HPV 3  to 4 years ago.  History of Present Illness  Past Medical History:   Diagnosis Date   • Allergic May, 2010    Skin allergies (all noted in record)   • Allergic rhinitis    • Anemia     TAKES SUPPLEMENTS. DENIES ANY CURRENT ISSUES   • Exercise-induced asthma    • HGSIL (high grade squamous intraepithelial lesion) on Pap smear of cervix    • Hypothyroid    • Low back pain On/Off 20 years    PT has managed most symptoms   • Lumbar stenosis    • Neurogenic claudication (HCC)    • Pneumonia Double pneumonia 2018   • RLS (restless legs syndrome) 07/12/2018    REPORTS THAT ON GABAPENTIN AND THAT HAS HELPED   • Vertigo       Family History   Problem Relation Age of Onset   • Liver disease Mother    • Arthritis Mother         Passed 4/2017   • Thyroid disease Mother    • Diabetes Father         MELLITUS   • Anxiety disorder Father         Passed 7/2021   • Hyperlipidemia Father    • Skin cancer Maternal Grandmother         UKNOWN SKIN CANCER   • Skin cancer Other         BASAL CELL    • Stroke Other       Past Surgical History:   Procedure Laterality Date   • COLONOSCOPY  02/21/2020    POLYPS, INTERNAL HEMORRHOIDS   • DIAGNOSTIC LAPAROSCOPY     • ECTOPIC PREGNANCY  1997,1999    REMOVAL    • ENDOMETRIAL ABLATION W/ NOVASURE     • LASER ABLATION  2012    ABNORMAL PERIODS/ DR. PRAJAPATI   • LEEP N/A 04/03/2017    Procedure: LOOP ELECTROCAUTERY EXCISION PROCEDURE;  Surgeon: Oly Rivera MD;  Location: [facility];  Service:    • LUMBAR LAMINECTOMY Left 11/10/2021    Procedure: MINIMALLY INVASIVE LUMBAR LAMINECTOMY, LEFT APPROACH, LUMBAR 3-LUMBAR 4, LUMBAR 4-LUMBAR 5;  Surgeon: David Duran MD;  Location: [facility];  Service: Neurosurgery;  Laterality: Left;   • OTHER SURGICAL HISTORY Left     PARTIAL AMPUTATION OF LEFT INDEX FINGER   • TUBAL ABDOMINAL LIGATION  1999?    Left only        Current Outpatient Medications:   •  multivitamin with minerals (MULTIVITAMIN ADULTS PO), , Disp: , Rfl:   •  ascorbic acid  "(VITAMIN C) 1000 MG tablet, Take 1,000 mg by mouth Daily., Disp: , Rfl:   •  calcium carbonate (OS-KALPANA) 600 MG tablet, Take 600 mg by mouth Daily., Disp: , Rfl:   •  cetirizine (zyrTEC) 10 MG tablet, Take 1 tablet by mouth Daily., Disp: 90 tablet, Rfl: 1  •  gabapentin (Neurontin) 300 MG capsule, Take 1 capsule by mouth Daily., Disp: 90 capsule, Rfl: 0  •  levothyroxine (SYNTHROID, LEVOTHROID) 25 MCG tablet, Take 1 tablet by mouth Daily., Disp: 90 tablet, Rfl: 1  •  mupirocin (BACTROBAN) 2 % ointment, Apply 1 application topically to the appropriate area as directed 2 (Two) Times a Day., Disp: 30 g, Rfl: 0  •  triamcinolone (KENALOG) 0.1 % cream, Apply 1 application topically to the appropriate area as directed As Needed (Itching)., Disp: 45 g, Rfl: 0    OBJECTIVE  Vital Signs:   BP 93/54 (BP Location: Right arm, Patient Position: Sitting, Cuff Size: Adult)   Pulse 66   Ht 172.7 cm (68\")   Wt 69.8 kg (153 lb 12.8 oz)   LMP  (LMP Unknown)   SpO2 99%   Breastfeeding No   BMI 23.39 kg/m²    Estimated body mass index is 23.39 kg/m² as calculated from the following:    Height as of this encounter: 172.7 cm (68\").    Weight as of this encounter: 69.8 kg (153 lb 12.8 oz).     Wt Readings from Last 3 Encounters:   09/08/22 69.8 kg (153 lb 12.8 oz)   03/23/22 71.2 kg (157 lb)   12/02/21 70.2 kg (154 lb 12.8 oz)     BP Readings from Last 3 Encounters:   09/08/22 93/54   03/23/22 113/53   12/02/21 96/62         @PHQ-9 Total Score:       Physical Exam  Vitals reviewed.   Constitutional:       General: She is not in acute distress.     Appearance: Normal appearance. She is well-developed. She is not diaphoretic.   HENT:      Head: Normocephalic and atraumatic. Hair is normal.      Right Ear: Hearing, tympanic membrane, ear canal and external ear normal. No decreased hearing noted. No drainage.      Left Ear: Hearing, tympanic membrane, ear canal and external ear normal. No decreased hearing noted.      Nose: Nose normal. " No nasal deformity.      Mouth/Throat:      Lips: Pink.      Mouth: Mucous membranes are moist.      Comments: cobblestoning back of pharynx  Eyes:      General: Lids are normal.         Right eye: No discharge.         Left eye: No discharge.      Extraocular Movements: Extraocular movements intact.      Conjunctiva/sclera: Conjunctivae normal.      Pupils: Pupils are equal, round, and reactive to light.   Neck:      Thyroid: No thyromegaly.      Vascular: No JVD.   Cardiovascular:      Rate and Rhythm: Normal rate and regular rhythm.      Pulses: Normal pulses.      Heart sounds: Normal heart sounds. No murmur heard.    No friction rub. No gallop.   Pulmonary:      Effort: Pulmonary effort is normal. No respiratory distress.      Breath sounds: Normal breath sounds. No wheezing or rales.   Chest:      Chest wall: No tenderness.   Abdominal:      General: Bowel sounds are normal. There is no distension.      Palpations: Abdomen is soft. There is no mass.      Tenderness: There is no abdominal tenderness. There is no guarding or rebound.      Hernia: No hernia is present.   Musculoskeletal:         General: No tenderness or deformity. Normal range of motion.      Cervical back: Normal range of motion and neck supple.   Lymphadenopathy:      Cervical: No cervical adenopathy.   Skin:     General: Skin is warm and dry.      Findings: No erythema or rash.   Neurological:      Mental Status: She is alert and oriented to person, place, and time.      Cranial Nerves: No cranial nerve deficit.      Motor: No abnormal muscle tone.      Coordination: Coordination normal.      Deep Tendon Reflexes: Reflexes are normal and symmetric. Reflexes normal.   Psychiatric:         Mood and Affect: Mood normal.         Behavior: Behavior normal.         Thought Content: Thought content normal.         Judgment: Judgment normal.          Result Review    CMP    CMP 3/24/22   Glucose 98   BUN 14   Creatinine 0.80   Sodium 141   Potassium  4.4   Chloride 102   Calcium 10.0   Albumin 4.40   Total Bilirubin 0.4   Alkaline Phosphatase 45   AST (SGOT) 38 (A)   ALT (SGPT) 20   (A) Abnormal value            CBC    CBC 3/24/22   WBC 5.08   RBC 4.45   Hemoglobin 13.7   Hematocrit 41.2   MCV 92.6   MCH 30.8   MCHC 33.3   RDW 11.7 (A)   Platelets 309   (A) Abnormal value            CBC w/diff    CBC w/Diff 3/24/22   WBC 5.08   RBC 4.45   Hemoglobin 13.7   Hematocrit 41.2   MCV 92.6   MCH 30.8   MCHC 33.3   RDW 11.7 (A)   Platelets 309   Neutrophil Rel % 50.1   Immature Granulocyte Rel % 0.4   Lymphocyte Rel % 37.0   Monocyte Rel % 8.9   Eosinophil Rel % 2.0   Basophil Rel % 1.6 (A)   (A) Abnormal value            Lipid Panel    Lipid Panel 3/24/22   Total Cholesterol 228 (A)   Triglycerides 68   HDL Cholesterol 64 (A)   VLDL Cholesterol 12   LDL Cholesterol  152 (A)   LDL/HDL Ratio 2.35   (A) Abnormal value            TSH    TSH 3/24/22   TSH 2.000             No Images in the past 120 days found..     The above data has been reviewed by JOSEPH Chiu  09/08/2022 08:09 EDT.          Patient Care Team:  Adela Perkins APRN as PCP - General (Nurse Practitioner)    BMI is within normal parameters. No other follow-up for BMI required.       ASSESSMENT & PLAN    Diagnoses and all orders for this visit:    1. Long term use of drug (Primary)  -     POC Urine Drug Screen [facility]    2. Neuropathy  -     gabapentin (Neurontin) 300 MG capsule; Take 1 capsule by mouth Daily.  Dispense: 90 capsule; Refill: 0    3. Seasonal allergies  Comments:  - She will start Zyrtec 10 mg daily.  - She will start Flonase 2 sprays daily.  - She will start a Medrol Dosepak for 5 days.  Orders:  -     cetirizine (zyrTEC) 10 MG tablet; Take 1 tablet by mouth Daily.  Dispense: 90 tablet; Refill: 1    4. Healthcare maintenance  Comments:  - She will have a urine drug screen today.  - She will return to the office in 3 months.            Tobacco Use: Low Risk    •  Smoking Tobacco Use: Never Smoker   • Smokeless Tobacco Use: Never Used       Follow Up     Return in about 3 months (around 12/8/2022).      Patient was given instructions and counseling regarding her condition or for health maintenance advice. Please see specific information pulled into the AVS if appropriate.   I have reviewed information obtained and documented by others and I have confirmed the accuracy of this documented note.    JOSEPH Chiu       Transcribed from ambient dictation for JOSEPH Chiu by DINA CANNON.  09/08/22   10:43 EDT    Patient verbalized consent to the visit recording.

## 2022-10-19 NOTE — PROGRESS NOTES
"Consult (Right eyebrow lesion )            History of Present Illness  Mercy Dalton is a 52 y.o. female who presents to [facility] as a consult for Right Eye Lesion. Pt noticed a small mole/lesion of right eyebrow and has not caused any pain or tenderness. It is slightly raised and denies any drainage or fluid. No history of skin cancer but does have a family history.        Subjective       Bacitracin, Codeine, Corticosteroids, Formaldehyde, Gold-containing drug products, Influenza virus vaccine, Latex, Neomycin, Polymyxin b, Polymyxin b-trimethoprim, Prednisone, and Septra [sulfamethoxazole-trimethoprim]  Allergies Reconciled.    Review of Systems    All review of system has been reviewed and it  is negative except the ones note above.     Objective     /77   Pulse 68   Temp 98 °F (36.7 °C)   Ht 172.7 cm (68\")   Wt 69.4 kg (153 lb)   SpO2 98%   BMI 23.26 kg/m²     Body mass index is 23.26 kg/m².    Physical Exam   Cardiovascular: Normal rate.     Pulmonary/Chest  Effort normal.      Face  Right Eyebrow: 1cm raised flesh colored lesion    Result Review :       Procedures         Assessment and Plan      Diagnoses and all orders for this visit:    1. Nevus (Primary)        Plan: Looks benign but recommend watching and if it starts to change in color or size then will need to address. Get yearly skin checks. RTC as needed.         Follow Up     No follow-ups on file.    Patient was given instructions and counseling regarding her condition. Please see specific information pulled into the AVS if appropriate.     Erika Hightower, APRN  10/20/2022  "

## 2022-10-20 ENCOUNTER — OFFICE VISIT (OUTPATIENT)
Dept: PLASTIC SURGERY | Facility: CLINIC | Age: 52
End: 2022-10-20

## 2022-10-20 VITALS
DIASTOLIC BLOOD PRESSURE: 77 MMHG | TEMPERATURE: 98 F | BODY MASS INDEX: 23.19 KG/M2 | HEIGHT: 68 IN | HEART RATE: 68 BPM | OXYGEN SATURATION: 98 % | WEIGHT: 153 LBS | SYSTOLIC BLOOD PRESSURE: 116 MMHG

## 2022-10-20 DIAGNOSIS — D22.9 NEVUS: Primary | ICD-10-CM

## 2022-10-20 PROCEDURE — 99212 OFFICE O/P EST SF 10 MIN: CPT | Performed by: NURSE PRACTITIONER

## 2022-12-08 ENCOUNTER — TELEMEDICINE (OUTPATIENT)
Dept: FAMILY MEDICINE CLINIC | Facility: CLINIC | Age: 52
End: 2022-12-08

## 2022-12-08 DIAGNOSIS — E03.9 HYPOTHYROIDISM, UNSPECIFIED TYPE: ICD-10-CM

## 2022-12-08 DIAGNOSIS — G62.9 NEUROPATHY: ICD-10-CM

## 2022-12-08 PROCEDURE — 99213 OFFICE O/P EST LOW 20 MIN: CPT | Performed by: NURSE PRACTITIONER

## 2022-12-08 RX ORDER — LEVOTHYROXINE SODIUM 0.03 MG/1
25 TABLET ORAL DAILY
Qty: 90 TABLET | Refills: 1 | Status: SHIPPED | OUTPATIENT
Start: 2022-12-08

## 2022-12-08 RX ORDER — GABAPENTIN 300 MG/1
300 CAPSULE ORAL DAILY
Qty: 90 CAPSULE | Refills: 0 | Status: SHIPPED | OUTPATIENT
Start: 2022-12-08 | End: 2023-01-24 | Stop reason: SDUPTHER

## 2022-12-08 NOTE — PROGRESS NOTES
Chief Complaint  Peripheral Neuropathy, Hypothyroidism, and Med Refill    Subjective         Mercy Dalton presents to [facility]  History of Present Illness     Hypothyroid-she is doing well on her current dose of synthroid-she is requesting refill.     Peripheral neuropathy-She is also requesting a refill of her neurontin she is almost out of her medication. States the dose is working well. Trever done today. UDS 9/8/22. She will be transferring care to Anitha RUIZ since I will be leaving the practice in Feb-she will be due an appt in March. She will stop by to do a UDS and Narcotic Consent prior to her appointment with Anitha since she will be due before her appointment.    Joint pain/hip pain-she is seeing -her last labs were normal. He gave her some exercises to do-she states if she does them then her pain is under control.       Objective   Vital Signs:   There were no vitals taken for this visit.    Virtual Visit Physical Exam  Result Review :   The following data was reviewed by: JOSEPH Chiu on 12/08/2022:  CMP    CMP 3/24/22   Glucose 98   BUN 14   Creatinine 0.80   Sodium 141   Potassium 4.4   Chloride 102   Calcium 10.0   Albumin 4.40   Total Bilirubin 0.4   Alkaline Phosphatase 45   AST (SGOT) 38 (A)   ALT (SGPT) 20   (A) Abnormal value            CBC    CBC 3/24/22   WBC 5.08   RBC 4.45   Hemoglobin 13.7   Hematocrit 41.2   MCV 92.6   MCH 30.8   MCHC 33.3   RDW 11.7 (A)   Platelets 309   (A) Abnormal value            CBC w/diff    CBC w/Diff 3/24/22   WBC 5.08   RBC 4.45   Hemoglobin 13.7   Hematocrit 41.2   MCV 92.6   MCH 30.8   MCHC 33.3   RDW 11.7 (A)   Platelets 309   Neutrophil Rel % 50.1   Immature Granulocyte Rel % 0.4   Lymphocyte Rel % 37.0   Monocyte Rel % 8.9   Eosinophil Rel % 2.0   Basophil Rel % 1.6 (A)   (A) Abnormal value            Lipid Panel    Lipid Panel 3/24/22   Total Cholesterol 228 (A)   Triglycerides 68   HDL  Cholesterol 64 (A)   VLDL Cholesterol 12   LDL Cholesterol  152 (A)   LDL/HDL Ratio 2.35   (A) Abnormal value            TSH    TSH 3/24/22   TSH 2.000                    Assessment and Plan    Diagnoses and all orders for this visit:    1. Neuropathy  -     gabapentin (Neurontin) 300 MG capsule; Take 1 capsule by mouth Daily.  Dispense: 90 capsule; Refill: 0    2. Hypothyroidism, unspecified type  -     levothyroxine (SYNTHROID, LEVOTHROID) 25 MCG tablet; Take 1 tablet by mouth Daily.  Dispense: 90 tablet; Refill: 1        Follow Up   Return in about 3 months (around 3/8/2023), or with Anitha RUIZ.  Patient was given instructions and counseling regarding her condition or for health maintenance advice. Please see specific information pulled into the AVS if appropriate.     Mode of Visit: Video  Location of patient: home  Location of provider: [facility]  You have chosen to receive care through a telehealth visit.  The patient has signed the video visit consent form.  The visit included audio and video interaction. No technical issues occurred during this visit.

## 2022-12-20 ENCOUNTER — HOSPITAL ENCOUNTER (OUTPATIENT)
Dept: GENERAL RADIOLOGY | Facility: HOSPITAL | Age: 52
Discharge: HOME | End: 2022-12-20
Admitting: NURSE PRACTITIONER

## 2022-12-20 DIAGNOSIS — M25.552 LEFT HIP PAIN: Primary | ICD-10-CM

## 2022-12-20 DIAGNOSIS — M25.552 LEFT HIP PAIN: ICD-10-CM

## 2022-12-20 PROCEDURE — 73502 X-RAY EXAM HIP UNI 2-3 VIEWS: CPT

## 2022-12-21 DIAGNOSIS — M25.551 CHRONIC HIP PAIN, BILATERAL: Primary | ICD-10-CM

## 2022-12-21 DIAGNOSIS — M25.552 CHRONIC HIP PAIN, BILATERAL: Primary | ICD-10-CM

## 2022-12-21 DIAGNOSIS — G89.29 CHRONIC HIP PAIN, BILATERAL: Primary | ICD-10-CM

## 2022-12-27 ENCOUNTER — OFFICE VISIT (OUTPATIENT)
Dept: ORTHOPEDIC SURGERY | Facility: CLINIC | Age: 52
End: 2022-12-27

## 2022-12-27 VITALS — BODY MASS INDEX: 23.86 KG/M2 | HEIGHT: 68 IN | WEIGHT: 157.4 LBS

## 2022-12-27 DIAGNOSIS — M70.62 TROCHANTERIC BURSITIS OF LEFT HIP: Primary | ICD-10-CM

## 2022-12-27 PROCEDURE — 99213 OFFICE O/P EST LOW 20 MIN: CPT | Performed by: ORTHOPAEDIC SURGERY

## 2022-12-27 PROCEDURE — 96372 THER/PROPH/DIAG INJ SC/IM: CPT | Performed by: ORTHOPAEDIC SURGERY

## 2022-12-27 RX ORDER — DICLOFENAC SODIUM 75 MG/1
75 TABLET, DELAYED RELEASE ORAL 2 TIMES DAILY
Qty: 60 TABLET | Refills: 1 | Status: SHIPPED | OUTPATIENT
Start: 2022-12-27

## 2022-12-27 RX ORDER — DEXAMETHASONE SODIUM PHOSPHATE 4 MG/ML
8 INJECTION, SOLUTION INTRA-ARTICULAR; INTRALESIONAL; INTRAMUSCULAR; INTRAVENOUS; SOFT TISSUE ONCE
Status: COMPLETED | OUTPATIENT
Start: 2022-12-27 | End: 2022-12-27

## 2022-12-27 RX ADMIN — DEXAMETHASONE SODIUM PHOSPHATE 8 MG: 4 INJECTION, SOLUTION INTRA-ARTICULAR; INTRALESIONAL; INTRAMUSCULAR; INTRAVENOUS; SOFT TISSUE at 08:17

## 2022-12-27 NOTE — PROGRESS NOTES
"Chief Complaint  Initial Evaluation of the Left Hip and Initial Evaluation of the Right Hip     Subjective      Mercy Dalton presents to [facility] for initial evaluation of bilateral hips.  About a month ago she had really bad lower back pain.  The a week later she noticed pain in her left deep buttock area then it moved around to the front of her hip and down the leg to mid thigh.  She notes it gives out going down or up stairs. She has had no injury or falls. She had back stenosis surgery a year ago.     Allergies   Allergen Reactions   • Bacitracin Rash   • Codeine Rash   • Corticosteroids Rash   • Formaldehyde Rash   • Gold-Containing Drug Products Rash   • Influenza Virus Vaccine Rash   • Latex Rash   • Neomycin Rash   • Polymyxin B Rash   • Polymyxin B-Trimethoprim Rash   • Prednisone Rash   • Septra [Sulfamethoxazole-Trimethoprim] Rash        Social History     Socioeconomic History   • Marital status:    Tobacco Use   • Smoking status: Never   • Smokeless tobacco: Never   Vaping Use   • Vaping Use: Never used   Substance and Sexual Activity   • Alcohol use: Yes     Alcohol/week: 3.0 standard drinks     Types: 1 Glasses of wine, 2 Cans of beer per week     Comment: SOCIALLY   • Drug use: No   • Sexual activity: Yes     Partners: Male     Birth control/protection: Post-menopausal     Comment: Current spouse        Review of Systems     Objective   Vital Signs:   Ht 172.7 cm (68\")   Wt 71.4 kg (157 lb 6.4 oz)   BMI 23.93 kg/m²       Physical Exam  Constitutional:       Appearance: Normal appearance. Patient is well-developed and normal weight.   HENT:      Head: Normocephalic.      Right Ear: Hearing and external ear normal.      Left Ear: Hearing and external ear normal.      Nose: Nose normal.   Eyes:      Conjunctiva/sclera: Conjunctivae normal.   Cardiovascular:      Rate and Rhythm: Normal rate.   Pulmonary:      Effort: Pulmonary effort is normal.      Breath " sounds: No wheezing or rales.   Abdominal:      Palpations: Abdomen is soft.      Tenderness: There is no abdominal tenderness.   Musculoskeletal:      Cervical back: Normal range of motion.   Skin:     Findings: No rash.   Neurological:      Mental Status: Patient is alert and oriented to person, place, and time.   Psychiatric:         Mood and Affect: Mood and affect normal.         Judgment: Judgment normal.       Ortho Exam      BILATERAL HIPS Dorsal pedal pulse 2+. Posterior tibialis pulse is 2+. Good tone of hip flexors, hip extensors, and hip abductors. Limited hip flexion.  Sensation intact. Neurovascular Intact. No swelling. No skin discoloration or muscle atrophy. No pain with rotation.       Procedures      Imaging Results (Most Recent)     None           Result Review :         XR Hip With or Without Pelvis 2 - 3 View Left    Result Date: 12/20/2022  Narrative: PROCEDURE: XR HIP W OR WO PELVIS 2-3 VIEW LEFT  COMPARISON: None  INDICATIONS: POSTERIOR LATERAL LEFT HIP PAIN WORSENING OVER 1-2 MONTHS, NO KNOWN INJURY  FINDINGS:  Three plain film images of the pelvis and left hip joint reveals that there is a tubal ligation clip overlying the left iliac crest and overlying the left side of the pelvis.  Mild arthritis is seen in the right left hip joints.  The right left SI joints and pubic symphysis joint are normal.  No evidence of fracture or dislocation or bone tumor or osteomyelitis or a vascular necrosis in the sacrum or pelvis or right or left hip joints      Impression:   1. No acute disease in the pelvis or left hip joint.  2. Mild arthritis right left hip joints      ANTHONY HARRIS MD       Electronically Signed and Approved By: ANTHONY HARRIS MD on 12/20/2022 at 16:47                      Assessment and Plan     Diagnoses and all orders for this visit:    1. Trochanteric bursitis of left hip (Primary)        Discussed the treatment plan with the patient. Discussed conservative measures as  exercises, anti-inflammatory and injection. Reviewed X rays from 12/20/22 prescribed from NP noting mild arthritis in the left hip. She was given HEP exercises and left hip IM injection.  She tolerated injection well. She was prescribed anti inflammatory Voltaren.     Call or return if worsening symptoms.    Follow Up     PRN    Patient was given instructions and counseling regarding her condition or for health maintenance advice. Please see specific information pulled into the AVS if appropriate.     Scribed for Tacho Salas MD by Melisa Talley MA.  12/27/22   07:56 EST    I have personally performed the services described in this document as scribed by the above individual and it is both accurate and complete. Tacho Salas MD 12/27/22

## 2023-01-03 ENCOUNTER — TELEPHONE (OUTPATIENT)
Dept: ORTHOPEDIC SURGERY | Facility: CLINIC | Age: 53
End: 2023-01-03
Payer: COMMERCIAL

## 2023-01-03 DIAGNOSIS — M70.62 TROCHANTERIC BURSITIS OF LEFT HIP: Primary | ICD-10-CM

## 2023-01-03 RX ORDER — METHOCARBAMOL 500 MG/1
750 TABLET, FILM COATED ORAL 2 TIMES DAILY
Qty: 60 TABLET | Refills: 1 | Status: SHIPPED | OUTPATIENT
Start: 2023-01-03 | End: 2023-01-10

## 2023-01-03 RX ORDER — METHYLPREDNISOLONE 4 MG/1
TABLET ORAL
Qty: 21 TABLET | Refills: 0 | Status: SHIPPED | OUTPATIENT
Start: 2023-01-03 | End: 2023-01-10

## 2023-01-03 NOTE — TELEPHONE ENCOUNTER
PATIENT CALLED WITH CONCERNS OF INCREASED PAIN IN HIP AFTER VISIT.  PER DR DUMONT MEDROL DOSE PACK AND RELAFEN 750MG BID #60 WERE SENT INTO [facility].  PATIENT WAS INSTRUCTED TO STOP VOLTAREN. PATIENT WAS COUNSELED ABOUT POSSIBLE RASH WITH THE USAGE OF MEDROL DOSE PACK AND AGREES TO TAKE DOSE PACK.   APPOINTMENT WAS GIVEN FOR 1- FOR FOLLOWUP

## 2023-01-04 ENCOUNTER — HOSPITAL ENCOUNTER (OUTPATIENT)
Dept: MAMMOGRAPHY | Facility: HOSPITAL | Age: 53
Discharge: HOME | End: 2023-01-04
Admitting: NURSE PRACTITIONER
Payer: COMMERCIAL

## 2023-01-04 ENCOUNTER — CLINICAL SUPPORT (OUTPATIENT)
Dept: FAMILY MEDICINE CLINIC | Facility: CLINIC | Age: 53
End: 2023-01-04
Payer: COMMERCIAL

## 2023-01-04 DIAGNOSIS — Z12.31 ENCOUNTER FOR SCREENING MAMMOGRAM FOR MALIGNANT NEOPLASM OF BREAST: ICD-10-CM

## 2023-01-04 DIAGNOSIS — Z79.899 LONG TERM USE OF DRUG: Primary | ICD-10-CM

## 2023-01-04 PROCEDURE — 80305 DRUG TEST PRSMV DIR OPT OBS: CPT | Performed by: NURSE PRACTITIONER

## 2023-01-04 PROCEDURE — 77063 BREAST TOMOSYNTHESIS BI: CPT

## 2023-01-04 PROCEDURE — 77067 SCR MAMMO BI INCL CAD: CPT

## 2023-01-06 ENCOUNTER — TELEPHONE (OUTPATIENT)
Dept: ORTHOPEDIC SURGERY | Facility: CLINIC | Age: 53
End: 2023-01-06
Payer: COMMERCIAL

## 2023-01-06 RX ORDER — CYCLOBENZAPRINE HCL 10 MG
10 TABLET ORAL 3 TIMES DAILY PRN
Qty: 90 TABLET | Refills: 1 | Status: SHIPPED | OUTPATIENT
Start: 2023-01-06 | End: 2023-01-26

## 2023-01-06 RX ORDER — MELOXICAM 15 MG/1
15 TABLET ORAL DAILY
Qty: 30 TABLET | Refills: 1 | Status: SHIPPED | OUTPATIENT
Start: 2023-01-06 | End: 2023-01-26

## 2023-01-09 ENCOUNTER — TELEPHONE (OUTPATIENT)
Dept: NEUROSURGERY | Facility: CLINIC | Age: 53
End: 2023-01-09
Payer: COMMERCIAL

## 2023-01-09 DIAGNOSIS — M54.9 ACUTE BACK PAIN, UNSPECIFIED BACK LOCATION, UNSPECIFIED BACK PAIN LATERALITY: Primary | ICD-10-CM

## 2023-01-09 RX ORDER — OXYCODONE HYDROCHLORIDE AND ACETAMINOPHEN 5; 325 MG/1; MG/1
1 TABLET ORAL EVERY 6 HOURS PRN
Qty: 30 TABLET | Refills: 0 | Status: ON HOLD
Start: 2023-01-09 | End: 2023-01-30 | Stop reason: SDUPTHER

## 2023-01-09 NOTE — TELEPHONE ENCOUNTER
Caller: Mercy Dalton    Relationship to patient: Self    Best call back number: [phone]    Chief complaint: DISC PROTRUSION L3-L4,     Type of visit: FOLLOW UP EXT    Requested date: TODAY    If rescheduling, when is the original appointment:     Additional notes:PT WAS IN [place], HAD TO GO TO ER ([facility]) DUE TO INCREASING PAIN IN LUMBAR AND HIP.    PT CONTACTED DR. CHRISTIANSON TO ASK WHAT TO DO.  PER PT DR. CHRISTIANSON SPOKE WITH DR. GORMAN, STATES HE IS IN SURGERY Monday 1/9/23 BUT IF PT IS BACK IN TOWN DR. GORMAN WILL SEE PT 1/10/2023.  PT HAD MRI LUMBAR IN [place], HAS DISK AND REPORT.  PT STATES  WILL BE DROPPING DISK AND REPORT OFF IN THE OFFICE  CALL PT FOR ADDITIONAL QUESTIONS

## 2023-01-09 NOTE — TELEPHONE ENCOUNTER
Patient phoned to inform us on what is happening to her, she has been having hip pain, she has seen Dr. Salas for this pain and was given a steroid shot home PT and Diclofenac, this was not helping the patient, she started having numbness down to her knee she was given muscle relaxer, medrol dose teresa she was told to stop the Diclofenac. On Friday she stooped down to unplug her lap top and had a horrible pain she had to have someone help her up and she went to the ER in [place], they did a MRI and was told to have disc protrusion, they put her on Oxycodone 5 mg one po Q 6 hours PRN pain, she has 3 pills left she was told to take Tylenol 600 mg, Diclofenac 75 mg and 300 mg Gabapentin, she states she will run out of this medication sooner that normal due to her doubling up on her Gabapentin. She does not need a refill on the Gabapentin at this time. She reached out to Dr. Chambers and he in return reached out to Dr. Duran to advise him of her condition, she had back surgery 11/20/21, Dr. Duran advised that she will need an appointment he is in surgery today but will try to get her an appointment, Mr. Dalton is going to take the disc to Dr. Duran's office and discuss an appointment. She is sending me the written report to scan into her chart so you can see the results.     The patient will need a refill on the Oxycodone 5 mg, she only has 3 left for today.

## 2023-01-10 ENCOUNTER — OFFICE VISIT (OUTPATIENT)
Dept: NEUROSURGERY | Facility: CLINIC | Age: 53
End: 2023-01-10
Payer: COMMERCIAL

## 2023-01-10 VITALS
SYSTOLIC BLOOD PRESSURE: 105 MMHG | DIASTOLIC BLOOD PRESSURE: 75 MMHG | HEIGHT: 68 IN | WEIGHT: 153.2 LBS | BODY MASS INDEX: 23.22 KG/M2

## 2023-01-10 DIAGNOSIS — M51.26 HERNIATED NUCLEUS PULPOSUS, L3-4 LEFT: Primary | ICD-10-CM

## 2023-01-10 PROCEDURE — 99214 OFFICE O/P EST MOD 30 MIN: CPT | Performed by: NEUROLOGICAL SURGERY

## 2023-01-10 NOTE — TELEPHONE ENCOUNTER
Spoke with Dr. Duran who advised he can see patient today. Contacted patient and appointment scheduled for 12:45 p.m.

## 2023-01-10 NOTE — PROGRESS NOTES
Mercy Dalton is a 52 y.o. female that presents with Back Pain       She had a new onset of left leg pain to the inner ankle. She was in [place]. She had an MRI with a left L3-4 disc herniation. She reports the leg pain and hip started about 4 weeks ago.      Review of Systems   Musculoskeletal: Positive for back pain and myalgias.   Neurological: Positive for numbness.        Vitals:    01/10/23 1255   BP: 105/75        Physical Exam  Constitutional:       Appearance: She is normal weight.   Cardiovascular:      Comments: No edema  Pulmonary:      Effort: Pulmonary effort is normal.   Neurological:      Mental Status: She is alert.      Sensory: Sensory deficit (decreased to light touch on the left inner calf and thigh) present.      Motor: Weakness (left quad and hip flexors) present.      Deep Tendon Reflexes: Reflexes abnormal (decreased patellar reflex on the left).   Psychiatric:         Mood and Affect: Mood normal.     I personally reviewed the patient's MRI scan which shows a left L3-4 disc herniation superiorly displaced.         Assessment and Plan {CC Problem List  Visit Diagnosis  ROS  Review (Popup)  Health Maintenance  Quality  BestPractice  Medications  SmartSets  SnapShot Encounters  Media :23}   Problem List Items Addressed This Visit    None  Visit Diagnoses     Herniated nucleus pulposus, L3-4 left    -  Primary      I have recommended surgery in light of the left leg weakness, sensory changes and decreased knee reflex.     Follow Up {Instructions Charge Capture  Follow-up Communications :23}   No follow-ups on file.   yes

## 2023-01-11 PROBLEM — M51.26 HERNIATED NUCLEUS PULPOSUS, L3-4 LEFT: Status: ACTIVE | Noted: 2023-01-11

## 2023-01-16 ENCOUNTER — TELEPHONE (OUTPATIENT)
Dept: NEUROSURGERY | Facility: CLINIC | Age: 53
End: 2023-01-16
Payer: COMMERCIAL

## 2023-01-16 NOTE — TELEPHONE ENCOUNTER
Caller: Mercy Dalton    Relationship to patient: Self    Best call back number: [phone]        Type of visit: POST OP, SURGERY SCHEDULED 1-30-23    Requested date: OUT OF TOWN ALL OF FEB. NEED APPT IN MARCH.     If rescheduling, when is the original appointment: 2-21-23    Additional notes:PT OUT OF TOWN, SHE WANTS TO KNOW IF THIS CAN BE A TELEPHONE VISIT OR RESCHEDULE TO COME INTO OFFICE.         PLEASE CALL TO ADVISE  THANK YOU   "Physical Therapy Treatment    Patient Name:  Dony Macedo Jr.   MRN:  [id number]    Recommendations:     Discharge Recommendations:  home with home health, home health PT   Discharge Equipment Recommendations: none   Barriers to discharge: None    Assessment:     Dony Macedo Jr. is a 71 y.o. male admitted with a medical diagnosis of HHNC (hyperglycemic hyperosmolar nonketotic coma).  He presents with the following impairments/functional limitations:  weakness, impaired endurance, gait instability, impaired balance, decreased safety awareness .  Patient demonstrates hip piking/anterior lean, wide DEVORAH, and moderate path deviation when attempting gait without AD. Patient encouraged to use his RW at home instead of typical plan of furniture cruising.    Rehab Prognosis: Good; patient would benefit from acute skilled PT services to address these deficits and reach maximum level of function.    Recent Surgery: Procedure(s) (LRB):  Left heart cath (Left) 1 Day Post-Op    Plan:     During this hospitalization, patient to be seen 5 x/week to address the identified rehab impairments via gait training, therapeutic activities, therapeutic exercises and progress toward the following goals:    · Plan of Care Expires:  06/20/22    Subjective     Chief Complaint: HHNC  Patient/Family Comments/goals: "I am going home later today. I don't usually use my walker at home."  Pain/Comfort:  · Pain Rating 1: 0/10  · Pain Rating Post-Intervention 1: 0/10      Objective:     Communicated with PILO Richmond prior to session.  Patient found supine, covers pulled over his head with peripheral IV upon PT entry to room.     General Precautions: Standard, fall   Orthopedic Precautions:N/A   Braces: N/A  Respiratory Status: Room air     Functional Mobility:  · Bed Mobility:     · Supine to Sit: modified independence  · Sit to Supine: modified independence  · Transfers:     · Sit to Stand:  modified independence with no AD  · Gait: 250' with RW " mod I, mild anterior lean. Patient wanted to walk without AD- see above for quality. Patient advised to use RW for stability      AM-PAC 6 CLICK MOBILITY  Turning over in bed (including adjusting bedclothes, sheets and blankets)?: 4  Sitting down on and standing up from a chair with arms (e.g., wheelchair, bedside commode, etc.): 4  Moving from lying on back to sitting on the side of the bed?: 4  Moving to and from a bed to a chair (including a wheelchair)?: 4  Need to walk in hospital room?: 4  Climbing 3-5 steps with a railing?: 3  Basic Mobility Total Score: 23       Therapeutic Activities and Exercises:   gait training and t/f as above    Patient left right sidelying with all lines intact, call button in reach and RN Yulisa Richmond notified..    GOALS:   Multidisciplinary Problems     Physical Therapy Goals        Problem: Physical Therapy    Goal Priority Disciplines Outcome Goal Variances Interventions   Physical Therapy Goal     PT, PT/OT Ongoing, Progressing     Description: Short Term Goals to be met by: 6/3/22    Patient will increase functional independence with mobility by performin. Supine to sit with independently  2. Sit to stand transfer with independently lowest level of assistive device  3. Bed to chair transfer with Stand by assist using lowest level of assistive device  4. Gait  x 100 feet with Stand by assist using lowest level of assistive device  5. Lower extremity exercise program x30 reps per handout, with assistance as needed    Long Term Goals to be met by: 22    Pt will regain full independent functional mobility with lowest level of assistive device to return to home situation and prior activities of daily living.                    Time Tracking:     PT Received On: 22  PT Start Time: 1056     PT Stop Time: 1108  PT Total Time (min): 12 min     Billable Minutes: Gait Training 10 minutes    Treatment Type: Treatment  PT/PTA: PT     PTA Visit Number: 0     2022

## 2023-01-24 DIAGNOSIS — G62.9 NEUROPATHY: ICD-10-CM

## 2023-01-24 RX ORDER — GABAPENTIN 300 MG/1
300 CAPSULE ORAL 3 TIMES DAILY
Qty: 270 CAPSULE | Refills: 0 | Status: SHIPPED | OUTPATIENT
Start: 2023-01-24

## 2023-01-26 RX ORDER — ACETAMINOPHEN 325 MG/1
650 TABLET ORAL 2 TIMES DAILY
COMMUNITY

## 2023-01-27 ENCOUNTER — ANESTHESIA EVENT (OUTPATIENT)
Dept: PERIOP | Facility: HOSPITAL | Age: 53
End: 2023-01-27
Payer: COMMERCIAL

## 2023-01-30 ENCOUNTER — APPOINTMENT (OUTPATIENT)
Dept: GENERAL RADIOLOGY | Facility: HOSPITAL | Age: 53
End: 2023-01-30
Payer: COMMERCIAL

## 2023-01-30 ENCOUNTER — ANESTHESIA (OUTPATIENT)
Dept: PERIOP | Facility: HOSPITAL | Age: 53
End: 2023-01-30
Payer: COMMERCIAL

## 2023-01-30 ENCOUNTER — HOSPITAL ENCOUNTER (OUTPATIENT)
Facility: HOSPITAL | Age: 53
Setting detail: HOSPITAL OUTPATIENT SURGERY
Discharge: HOME | End: 2023-01-30
Attending: NEUROLOGICAL SURGERY | Admitting: NEUROLOGICAL SURGERY
Payer: COMMERCIAL

## 2023-01-30 VITALS
HEIGHT: 68 IN | HEART RATE: 59 BPM | WEIGHT: 151.24 LBS | SYSTOLIC BLOOD PRESSURE: 101 MMHG | DIASTOLIC BLOOD PRESSURE: 59 MMHG | RESPIRATION RATE: 20 BRPM | TEMPERATURE: 96.6 F | OXYGEN SATURATION: 99 % | BODY MASS INDEX: 22.92 KG/M2

## 2023-01-30 DIAGNOSIS — M51.26 HERNIATED NUCLEUS PULPOSUS, L3-4 LEFT: ICD-10-CM

## 2023-01-30 LAB — QT INTERVAL: 390 MS

## 2023-01-30 PROCEDURE — 25010000002 PROPOFOL 10 MG/ML EMULSION: Performed by: NURSE ANESTHETIST, CERTIFIED REGISTERED

## 2023-01-30 PROCEDURE — 25010000002 FENTANYL CITRATE (PF) 50 MCG/ML SOLUTION: Performed by: NURSE ANESTHETIST, CERTIFIED REGISTERED

## 2023-01-30 PROCEDURE — 25010000002 HYDROMORPHONE 1 MG/ML SOLUTION: Performed by: NURSE ANESTHETIST, CERTIFIED REGISTERED

## 2023-01-30 PROCEDURE — 25010000002 CEFAZOLIN IN DEXTROSE 2-4 GM/100ML-% SOLUTION: Performed by: NEUROLOGICAL SURGERY

## 2023-01-30 PROCEDURE — 76000 FLUOROSCOPY <1 HR PHYS/QHP: CPT

## 2023-01-30 PROCEDURE — 93005 ELECTROCARDIOGRAM TRACING: CPT | Performed by: ANESTHESIOLOGY

## 2023-01-30 PROCEDURE — 63030 LAMOT DCMPRN NRV RT 1 LMBR: CPT | Performed by: NEUROLOGICAL SURGERY

## 2023-01-30 PROCEDURE — 93010 ELECTROCARDIOGRAM REPORT: CPT | Performed by: INTERNAL MEDICINE

## 2023-01-30 PROCEDURE — 25010000002 ONDANSETRON PER 1 MG: Performed by: NURSE ANESTHETIST, CERTIFIED REGISTERED

## 2023-01-30 PROCEDURE — 25010000002 MIDAZOLAM PER 1 MG: Performed by: ANESTHESIOLOGY

## 2023-01-30 RX ORDER — LIDOCAINE HYDROCHLORIDE 20 MG/ML
INJECTION, SOLUTION EPIDURAL; INFILTRATION; INTRACAUDAL; PERINEURAL AS NEEDED
Status: DISCONTINUED | OUTPATIENT
Start: 2023-01-30 | End: 2023-01-30 | Stop reason: SURG

## 2023-01-30 RX ORDER — SODIUM CHLORIDE, SODIUM LACTATE, POTASSIUM CHLORIDE, CALCIUM CHLORIDE 600; 310; 30; 20 MG/100ML; MG/100ML; MG/100ML; MG/100ML
9 INJECTION, SOLUTION INTRAVENOUS CONTINUOUS PRN
Status: DISCONTINUED | OUTPATIENT
Start: 2023-01-30 | End: 2023-01-30 | Stop reason: HOSPADM

## 2023-01-30 RX ORDER — DEXMEDETOMIDINE HYDROCHLORIDE 100 UG/ML
INJECTION, SOLUTION INTRAVENOUS AS NEEDED
Status: DISCONTINUED | OUTPATIENT
Start: 2023-01-30 | End: 2023-01-30 | Stop reason: SURG

## 2023-01-30 RX ORDER — ROCURONIUM BROMIDE 10 MG/ML
INJECTION, SOLUTION INTRAVENOUS AS NEEDED
Status: DISCONTINUED | OUTPATIENT
Start: 2023-01-30 | End: 2023-01-30 | Stop reason: SURG

## 2023-01-30 RX ORDER — MEPERIDINE HYDROCHLORIDE 25 MG/ML
12.5 INJECTION INTRAMUSCULAR; INTRAVENOUS; SUBCUTANEOUS
Status: DISCONTINUED | OUTPATIENT
Start: 2023-01-30 | End: 2023-01-30 | Stop reason: HOSPADM

## 2023-01-30 RX ORDER — ONDANSETRON 2 MG/ML
INJECTION INTRAMUSCULAR; INTRAVENOUS AS NEEDED
Status: DISCONTINUED | OUTPATIENT
Start: 2023-01-30 | End: 2023-01-30 | Stop reason: SURG

## 2023-01-30 RX ORDER — EPHEDRINE SULFATE 50 MG/ML
INJECTION, SOLUTION INTRAVENOUS AS NEEDED
Status: DISCONTINUED | OUTPATIENT
Start: 2023-01-30 | End: 2023-01-30 | Stop reason: SURG

## 2023-01-30 RX ORDER — SCOLOPAMINE TRANSDERMAL SYSTEM 1 MG/1
1 PATCH, EXTENDED RELEASE TRANSDERMAL ONCE
Status: DISCONTINUED | OUTPATIENT
Start: 2023-01-30 | End: 2023-01-30 | Stop reason: HOSPADM

## 2023-01-30 RX ORDER — FENTANYL CITRATE 50 UG/ML
INJECTION, SOLUTION INTRAMUSCULAR; INTRAVENOUS AS NEEDED
Status: DISCONTINUED | OUTPATIENT
Start: 2023-01-30 | End: 2023-01-30 | Stop reason: SURG

## 2023-01-30 RX ORDER — BUPIVACAINE HYDROCHLORIDE AND EPINEPHRINE 5; 5 MG/ML; UG/ML
INJECTION, SOLUTION EPIDURAL; INTRACAUDAL; PERINEURAL AS NEEDED
Status: DISCONTINUED | OUTPATIENT
Start: 2023-01-30 | End: 2023-01-30 | Stop reason: HOSPADM

## 2023-01-30 RX ORDER — ACETAMINOPHEN 500 MG
1000 TABLET ORAL ONCE
Status: COMPLETED | OUTPATIENT
Start: 2023-01-30 | End: 2023-01-30

## 2023-01-30 RX ORDER — MAGNESIUM HYDROXIDE 1200 MG/15ML
LIQUID ORAL AS NEEDED
Status: DISCONTINUED | OUTPATIENT
Start: 2023-01-30 | End: 2023-01-30 | Stop reason: HOSPADM

## 2023-01-30 RX ORDER — PROPOFOL 10 MG/ML
VIAL (ML) INTRAVENOUS AS NEEDED
Status: DISCONTINUED | OUTPATIENT
Start: 2023-01-30 | End: 2023-01-30 | Stop reason: SURG

## 2023-01-30 RX ORDER — PROMETHAZINE HYDROCHLORIDE 12.5 MG/1
25 TABLET ORAL ONCE AS NEEDED
Status: DISCONTINUED | OUTPATIENT
Start: 2023-01-30 | End: 2023-01-30 | Stop reason: HOSPADM

## 2023-01-30 RX ORDER — MUPIROCIN CALCIUM 20 MG/G
1 CREAM TOPICAL 3 TIMES DAILY
COMMUNITY

## 2023-01-30 RX ORDER — MIDAZOLAM HYDROCHLORIDE 1 MG/ML
2 INJECTION INTRAMUSCULAR; INTRAVENOUS ONCE
Status: COMPLETED | OUTPATIENT
Start: 2023-01-30 | End: 2023-01-30

## 2023-01-30 RX ORDER — OXYCODONE HYDROCHLORIDE 5 MG/1
5 TABLET ORAL
Status: DISCONTINUED | OUTPATIENT
Start: 2023-01-30 | End: 2023-01-30 | Stop reason: HOSPADM

## 2023-01-30 RX ORDER — OXYCODONE HYDROCHLORIDE AND ACETAMINOPHEN 5; 325 MG/1; MG/1
1 TABLET ORAL EVERY 4 HOURS PRN
Qty: 25 TABLET | Refills: 0 | Status: SHIPPED | OUTPATIENT
Start: 2023-01-30

## 2023-01-30 RX ORDER — ONDANSETRON 2 MG/ML
4 INJECTION INTRAMUSCULAR; INTRAVENOUS ONCE AS NEEDED
Status: DISCONTINUED | OUTPATIENT
Start: 2023-01-30 | End: 2023-01-30 | Stop reason: HOSPADM

## 2023-01-30 RX ORDER — CEFAZOLIN SODIUM 2 G/100ML
2 INJECTION, SOLUTION INTRAVENOUS ONCE
Status: COMPLETED | OUTPATIENT
Start: 2023-01-30 | End: 2023-01-30

## 2023-01-30 RX ORDER — PROMETHAZINE HYDROCHLORIDE 25 MG/1
25 SUPPOSITORY RECTAL ONCE AS NEEDED
Status: DISCONTINUED | OUTPATIENT
Start: 2023-01-30 | End: 2023-01-30 | Stop reason: HOSPADM

## 2023-01-30 RX ADMIN — SUGAMMADEX 200 MG: 100 INJECTION, SOLUTION INTRAVENOUS at 08:37

## 2023-01-30 RX ADMIN — LIDOCAINE HYDROCHLORIDE 60 MG: 20 INJECTION, SOLUTION EPIDURAL; INFILTRATION; INTRACAUDAL; PERINEURAL at 07:25

## 2023-01-30 RX ADMIN — ROCURONIUM BROMIDE 50 MG: 10 INJECTION, SOLUTION INTRAVENOUS at 07:25

## 2023-01-30 RX ADMIN — FENTANYL CITRATE 50 MCG: 50 INJECTION, SOLUTION INTRAMUSCULAR; INTRAVENOUS at 08:30

## 2023-01-30 RX ADMIN — HYDROMORPHONE HYDROCHLORIDE 0.25 MG: 1 INJECTION, SOLUTION INTRAMUSCULAR; INTRAVENOUS; SUBCUTANEOUS at 08:57

## 2023-01-30 RX ADMIN — PROPOFOL 200 MG: 10 INJECTION, EMULSION INTRAVENOUS at 07:25

## 2023-01-30 RX ADMIN — OXYCODONE HYDROCHLORIDE 5 MG: 5 TABLET ORAL at 08:58

## 2023-01-30 RX ADMIN — DEXMEDETOMIDINE HYDROCHLORIDE 10 MCG: 100 INJECTION, SOLUTION, CONCENTRATE INTRAVENOUS at 07:22

## 2023-01-30 RX ADMIN — CEFAZOLIN SODIUM 2 G: 2 INJECTION, SOLUTION INTRAVENOUS at 07:21

## 2023-01-30 RX ADMIN — ONDANSETRON 4 MG: 2 INJECTION INTRAMUSCULAR; INTRAVENOUS at 07:49

## 2023-01-30 RX ADMIN — FENTANYL CITRATE 50 MCG: 50 INJECTION, SOLUTION INTRAMUSCULAR; INTRAVENOUS at 07:25

## 2023-01-30 RX ADMIN — MIDAZOLAM HYDROCHLORIDE 2 MG: 2 INJECTION, SOLUTION INTRAMUSCULAR; INTRAVENOUS at 07:06

## 2023-01-30 RX ADMIN — ACETAMINOPHEN 1000 MG: 500 TABLET ORAL at 07:06

## 2023-01-30 RX ADMIN — EPHEDRINE SULFATE 5 MG: 50 INJECTION INTRAVENOUS at 07:55

## 2023-01-30 RX ADMIN — EPHEDRINE SULFATE 5 MG: 50 INJECTION INTRAVENOUS at 07:50

## 2023-01-30 RX ADMIN — SCOPALAMINE 1 PATCH: 1 PATCH, EXTENDED RELEASE TRANSDERMAL at 07:06

## 2023-01-30 RX ADMIN — SODIUM CHLORIDE, POTASSIUM CHLORIDE, SODIUM LACTATE AND CALCIUM CHLORIDE 9 ML/HR: 600; 310; 30; 20 INJECTION, SOLUTION INTRAVENOUS at 07:06

## 2023-01-30 RX ADMIN — EPHEDRINE SULFATE 10 MG: 50 INJECTION INTRAVENOUS at 08:15

## 2023-01-30 RX ADMIN — DEXMEDETOMIDINE HYDROCHLORIDE 10 MCG: 100 INJECTION, SOLUTION, CONCENTRATE INTRAVENOUS at 07:34

## 2023-01-30 NOTE — ANESTHESIA PREPROCEDURE EVALUATION
Anesthesia Evaluation     Patient summary reviewed and Nursing notes reviewed   no history of anesthetic complications:  NPO Solid Status: > 8 hours  NPO Liquid Status: > 2 hours           Airway   Mallampati: I  TM distance: >3 FB  Neck ROM: full  No difficulty expected  Dental      Pulmonary - normal exam    breath sounds clear to auscultation  (+) asthma,  Cardiovascular - negative cardio ROS and normal exam  Exercise tolerance: good (4-7 METS)    Rhythm: regular  Rate: normal        Neuro/Psych  (+) dizziness/light headedness,    GI/Hepatic/Renal/Endo    (+)   thyroid problem hypothyroidism    Musculoskeletal     (+) back pain, chronic pain,   Abdominal    Substance History      OB/GYN          Other        ROS/Med Hx Other: <4METS, DECREASED MOBILITY/PAIN. MED RECS STATE DIFF AIRWAY/INTUBATION. PT STATES UNAWARE. KT                   Anesthesia Plan    ASA 2     general     (Patient understands anesthesia not responsible for dental damage.)  intravenous induction     Anesthetic plan, risks, benefits, and alternatives have been provided, discussed and informed consent has been obtained with: patient.    Plan discussed with CRNA.        CODE STATUS:

## 2023-01-30 NOTE — ANESTHESIA POSTPROCEDURE EVALUATION
Patient: Mercy Dalton    Procedure Summary     Date: 01/30/23 Room / Location: [facility] OR 05 / [facility] MAIN OR    Anesthesia Start: 0720 Anesthesia Stop: 0847    Procedure: MINIMALLY INVASIVE LUMBAR DISCECTOMY, left approach, LUMBAR THREE-LUMBAR FOUR (Left: Spine Lumbar) Diagnosis:       Herniated nucleus pulposus, L3-4 left      (Herniated nucleus pulposus, L3-4 left [M51.26])    Surgeons: David Duran MD Provider: Jackson Osei MD    Anesthesia Type: general ASA Status: 2          Anesthesia Type: general    Vitals  Vitals Value Taken Time   /56 01/30/23 0920   Temp 36.1 °C (97 °F) 01/30/23 0844   Pulse 70 01/30/23 0925   Resp 12 01/30/23 0920   SpO2 97 % 01/30/23 0925   Vitals shown include unvalidated device data.        Post Anesthesia Care and Evaluation    Patient location during evaluation: bedside  Patient participation: complete - patient participated  Level of consciousness: awake  Pain management: adequate    Airway patency: patent  Anesthetic complications: No anesthetic complications  PONV Status: none  Cardiovascular status: acceptable and stable  Respiratory status: acceptable  Hydration status: acceptable    Comments: An Anesthesiologist personally participated in the most demanding procedures (including induction and emergence if applicable) in the anesthesia plan, monitored the course of anesthesia administration at frequent intervals and remained physically present and available for immediate diagnosis and treatment of emergencies.

## 2023-02-02 ENCOUNTER — TELEPHONE (OUTPATIENT)
Dept: NEUROSURGERY | Facility: CLINIC | Age: 53
End: 2023-02-02
Payer: COMMERCIAL

## 2023-02-02 NOTE — TELEPHONE ENCOUNTER
Patient had MID on Monday, 1-30-23. Called today with complaints of still having some numbness/tingling in the left leg. States it has improved slightly since surgery, but wanted to make sure this was normal.

## 2023-03-07 ENCOUNTER — TELEPHONE (OUTPATIENT)
Dept: NEUROSURGERY | Facility: CLINIC | Age: 53
End: 2023-03-07
Payer: COMMERCIAL

## 2023-03-07 NOTE — TELEPHONE ENCOUNTER
I would recommend a regularly scheduled NSAID for 3-5 days (aleve or ibuprofen). I can send in something stronger such as tramadol or norco if desired.

## 2023-03-07 NOTE — TELEPHONE ENCOUNTER
Patient left a voicemail this morning that she spoke with you yesterday regarding low back pain after a fall on Saturday. States the pain is still just in her low back. She would like to know what to do for pain management? What medication to take/how much? How long should she expect to manage pain before making an appointment to follow up?

## 2023-05-22 ENCOUNTER — OFFICE VISIT (OUTPATIENT)
Dept: FAMILY MEDICINE CLINIC | Facility: CLINIC | Age: 53
End: 2023-05-22
Payer: COMMERCIAL

## 2023-05-22 VITALS
HEART RATE: 70 BPM | DIASTOLIC BLOOD PRESSURE: 71 MMHG | HEIGHT: 68 IN | WEIGHT: 149 LBS | BODY MASS INDEX: 22.58 KG/M2 | SYSTOLIC BLOOD PRESSURE: 106 MMHG | OXYGEN SATURATION: 100 % | RESPIRATION RATE: 20 BRPM

## 2023-05-22 DIAGNOSIS — G62.9 NEUROPATHY: ICD-10-CM

## 2023-05-22 DIAGNOSIS — Z00.00 ANNUAL PHYSICAL EXAM: Primary | ICD-10-CM

## 2023-05-22 DIAGNOSIS — E53.8 VITAMIN B 12 DEFICIENCY: ICD-10-CM

## 2023-05-22 DIAGNOSIS — E03.9 HYPOTHYROIDISM, UNSPECIFIED TYPE: ICD-10-CM

## 2023-05-22 DIAGNOSIS — Z76.0 MEDICATION REFILL: ICD-10-CM

## 2023-05-22 RX ORDER — GABAPENTIN 300 MG/1
600 CAPSULE ORAL DAILY
Qty: 270 CAPSULE | Refills: 1 | Status: SHIPPED | OUTPATIENT
Start: 2023-05-22

## 2023-05-22 RX ORDER — GABAPENTIN 100 MG/1
100 CAPSULE ORAL AS NEEDED
Qty: 30 CAPSULE | Refills: 2 | Status: SHIPPED | OUTPATIENT
Start: 2023-05-22 | End: 2023-06-23

## 2023-05-22 RX ORDER — LEVOTHYROXINE SODIUM 0.03 MG/1
25 TABLET ORAL DAILY
Qty: 90 TABLET | Refills: 1 | Status: SHIPPED | OUTPATIENT
Start: 2023-05-22

## 2023-05-22 RX ORDER — GABAPENTIN 300 MG/1
300 CAPSULE ORAL 3 TIMES DAILY
Qty: 270 CAPSULE | Refills: 0 | Status: CANCELLED | OUTPATIENT
Start: 2023-05-22

## 2023-05-22 NOTE — PROGRESS NOTES
Chief Complaint  Establish Care    SUBJECTIVE  Mercy Dalton presents to [facility]    History of Present Illness  53-year-old Mercy Dalton presents today to establish care.  She is a previous patient of Adela Perkins.    Patient has a history of herniated disks and states that she takes 600 mg of Neurontin daily and occasionally will take a third capsule to help with pain.  Would like to see if she can get 100 mg capsules to take instead.  States that she had operation on the herniated disc in January and has been working for her since then.  Her last UDS was 1/4/2023.  We discussed that we would do a UDS every 6 months and we can do this today to avoid having to have her come back.    Patient has a history of hypothyroidism and is currently on 25 mcg of levothyroxine and is doing well on this dosage.        Past Medical History:   Diagnosis Date   • Allergic May, 2010    Skin allergies (all noted in record)   • Allergic rhinitis    • Anemia     TAKES OVER THE COUNTER IRON SUPPLEMENT   • Anesthesia     HAD BEEN MARKED  AS DIFFICULT INTUBATION PREVIOUSLY ONLY ISSUE PT REPORTS IS HAD PONV WITH PREVIOUS SURGERY   • Exercise-induced asthma     REPORTS HAS RESOLVED AND NO ISSUES CURRENTLY   • Herniated nucleus pulposus, L3-4 left    • HGSIL (high grade squamous intraepithelial lesion) on Pap smear of cervix    • Hypothyroid    • Low back pain On/Off 20 years   • Lumbar stenosis    • Neurogenic claudication    • Pneumonia Double pneumonia 2018    DENIES ANY CURRENT ISSUES   • PONV (postoperative nausea and vomiting)    • RLS (restless legs syndrome) 07/12/2018    REPORTS THAT ON GABAPENTIN AND THAT HAS HELPED   • Vertigo       Family History   Problem Relation Age of Onset   • Liver disease Mother    • Arthritis Mother         Passed 4/2017   • Thyroid disease Mother    • Diabetes Father         MELLITUS   • Anxiety disorder Father         Passed 7/2021   • Hyperlipidemia Father    •  Skin cancer Maternal Grandmother         [place] SKIN CANCER   • Skin cancer Other         BASAL CELL    • Stroke Other       Past Surgical History:   Procedure Laterality Date   • COLONOSCOPY  02/21/2020    POLYPS, INTERNAL HEMORRHOIDS   • DIAGNOSTIC LAPAROSCOPY     • ECTOPIC PREGNANCY  1997,1999    REMOVAL    • ENDOMETRIAL ABLATION W/ NOVASURE     • HERNIA REPAIR  1/2023   • LASER ABLATION  2012    ABNORMAL PERIODS/ DR. PRAJAPATI   • LEEP N/A 04/03/2017    Procedure: LOOP ELECTROCAUTERY EXCISION PROCEDURE;  Surgeon: Oly Rivera MD;  [facility];  Service:    • LUMBAR DISCECTOMY Left 01/30/2023    Procedure: MINIMALLY INVASIVE LUMBAR DISCECTOMY, left approach, LUMBAR THREE-LUMBAR FOUR;  Surgeon: David Duran MD;  Location: [facility] OR;  Service: Neurosurgery;  Laterality: Left;   • LUMBAR LAMINECTOMY Left 11/10/2021    Procedure: MINIMALLY INVASIVE LUMBAR LAMINECTOMY, LEFT APPROACH, LUMBAR 3-LUMBAR 4, LUMBAR 4-LUMBAR 5;  Surgeon: David Duran MD;  Location: [facility] OR;  Service: Neurosurgery;  Laterality: Left;   • OTHER SURGICAL HISTORY Left     PARTIAL AMPUTATION OF LEFT INDEX FINGER   • TUBAL ABDOMINAL LIGATION  1999?    Left only        Current Outpatient Medications:   •  calcium carbonate (OS-KALPANA) 600 MG tablet, Take 1 tablet by mouth Daily., Disp: , Rfl:   •  gabapentin (Neurontin) 300 MG capsule, Take 1 capsule by mouth 3 (Three) Times a Day. (Patient taking differently: Take 1 capsule by mouth 1 (One) Time.), Disp: 270 capsule, Rfl: 0  •  levothyroxine (SYNTHROID, LEVOTHROID) 25 MCG tablet, Take 1 tablet by mouth Daily., Disp: 90 tablet, Rfl: 1  •  multivitamin with minerals tablet tablet, Take 1 tablet by mouth Daily., Disp: , Rfl:   •  mupirocin (BACTROBAN) 2 % cream, Apply 1 application topically to the appropriate area as directed 3 (Three) Times a Day., Disp: , Rfl:   •  triamcinolone (KENALOG) 0.1 % cream, Apply 1 application topically to the appropriate area as directed  "As Needed (Itching)., Disp: 45 g, Rfl: 0    OBJECTIVE  Vital Signs:   /71 (BP Location: Left arm, Patient Position: Sitting, Cuff Size: Large Adult)   Pulse 70   Resp 20   Ht 172.7 cm (68\")   Wt 67.6 kg (149 lb)   LMP  (LMP Unknown)   SpO2 100%   BMI 22.66 kg/m²    Estimated body mass index is 22.66 kg/m² as calculated from the following:    Height as of this encounter: 172.7 cm (68\").    Weight as of this encounter: 67.6 kg (149 lb).     Wt Readings from Last 3 Encounters:   05/22/23 67.6 kg (149 lb)   01/30/23 68.6 kg (151 lb 3.8 oz)   01/10/23 69.5 kg (153 lb 3.2 oz)     BP Readings from Last 3 Encounters:   05/22/23 106/71   01/30/23 101/59   01/10/23 105/75       Physical Exam  Vitals and nursing note reviewed.   Constitutional:       Appearance: Normal appearance.   HENT:      Head: Normocephalic and atraumatic.      Nose: Nose normal.      Mouth/Throat:      Mouth: Mucous membranes are moist.   Eyes:      Conjunctiva/sclera: Conjunctivae normal.      Pupils: Pupils are equal, round, and reactive to light.   Cardiovascular:      Rate and Rhythm: Normal rate and regular rhythm.      Pulses: Normal pulses.      Heart sounds: Normal heart sounds.   Pulmonary:      Effort: Pulmonary effort is normal.      Breath sounds: Normal breath sounds.   Abdominal:      General: Abdomen is flat.      Palpations: Abdomen is soft.   Musculoskeletal:         General: Normal range of motion.      Cervical back: Normal range of motion and neck supple.   Skin:     General: Skin is warm and dry.   Neurological:      General: No focal deficit present.      Mental Status: She is alert and oriented to person, place, and time. Mental status is at baseline.   Psychiatric:         Mood and Affect: Mood normal.         Behavior: Behavior normal.         Thought Content: Thought content normal.         Judgment: Judgment normal.          Result Review                No Images in the past 120 days found..      The above data " has been reviewed by JOSEPH Garcia 05/22/2023 15:28 EDT.          Patient Care Team:  Ana Nino APRN as PCP - General (Family Medicine)  Oly Rivera MD as Consulting Physician (Obstetrics and Gynecology)    BMI is within normal parameters. No other follow-up for BMI required.       ASSESSMENT & PLAN    Diagnoses and all orders for this visit:    1. Annual physical exam (Primary)  Comments:  Patient will have labs done.  Orders:  -     TSH; Future  -     Lipid panel; Future  -     CBC w AUTO Differential; Future  -     Comprehensive metabolic panel; Future    2. Hypothyroidism, unspecified type  Comments:  No changes at this time.  She will have labs drawn.  Orders:  -     levothyroxine (SYNTHROID, LEVOTHROID) 25 MCG tablet; Take 1 tablet by mouth Daily.  Dispense: 90 tablet; Refill: 1    3. Neuropathy  Comments:  Have sent in request to Dr. Brothers to fill 600 mg daily and 100 mg as needed for neuropathy.    4. Vitamin B 12 deficiency  Comments:  We will have this done with labs.  Orders:  -     Vitamin B12; Future    5. Medication refill  -     POC Urine Drug Screen [facility]         Tobacco Use: Low Risk    • Smoking Tobacco Use: Never   • Smokeless Tobacco Use: Never   • Passive Exposure: Not on file       Follow Up     No follow-ups on file.      Patient was given instructions and counseling regarding her condition or for health maintenance advice. Please see specific information pulled into the AVS if appropriate.   I have reviewed information obtained and documented by others and I have confirmed the accuracy of this documented note.    JOSEPH Garcia    Subjective   Mercy Dalton is a 53 y.o. female and is here for a comprehensive physical exam. The patient reports problems - right hip pain. .    Do you take any herbs or supplements that were not prescribed by a doctor? no  Are you taking calcium supplements? no  Are you taking aspirin daily? no     History:  LMP: No LMP  "recorded (lmp unknown). Patient is postmenopausal.      The following portions of the patient's history were reviewed and updated as appropriate: allergies, current medications, past family history, past medical history, past social history, past surgical history and problem list.    Review of Systems  Do you have pain that bothers you in your daily life? yes  A comprehensive review of systems was negative except for: Musculoskeletal: positive for bone pain and myalgias    Objective   /71 (BP Location: Left arm, Patient Position: Sitting, Cuff Size: Large Adult)   Pulse 70   Resp 20   Ht 172.7 cm (68\")   Wt 67.6 kg (149 lb)   LMP  (LMP Unknown)   SpO2 100%   BMI 22.66 kg/m²     General Appearance:    Alert, cooperative, no distress, appears stated age   Head:    Normocephalic, without obvious abnormality, atraumatic   Eyes:    PERRL, conjunctiva/corneas clear, EOM's intact, fundi     benign, both eyes   Ears:    Normal TM's and external ear canals, both ears   Nose:   Nares normal, septum midline, mucosa normal, no drainage    or sinus tenderness   Throat:   Lips, mucosa, and tongue normal; teeth and gums normal   Neck:   Supple, symmetrical, trachea midline, no adenopathy;     thyroid:  no enlargement/tenderness/nodules; no carotid    bruit or JVD   Back:     Symmetric, no curvature, ROM normal, no CVA tenderness   Lungs:     Clear to auscultation bilaterally, respirations unlabored   Chest Wall:    No tenderness or deformity    Heart:    Regular rate and rhythm, S1 and S2 normal, no murmur, rub   or gallop   Breast Exam:    No tenderness, masses, or nipple abnormality   Abdomen:     Soft, non-tender, bowel sounds active all four quadrants,     no masses, no organomegaly   Genitalia:    Normal female without lesion, discharge or tenderness   Rectal:    Normal tone, no masses or tenderness; guaiac negative stool   Extremities:   Extremities normal, atraumatic, no cyanosis or edema   Pulses:   2+ and " symmetric all extremities   Skin:   Skin color, texture, turgor normal, no rashes or lesions   Lymph nodes:   Cervical, supraclavicular, and axillary nodes normal   Neurologic:   CNII-XII intact, normal strength, sensation and reflexes     throughout           Past Surgical History:   Procedure Laterality Date   • COLONOSCOPY  02/21/2020    POLYPS, INTERNAL HEMORRHOIDS   • DIAGNOSTIC LAPAROSCOPY     • ECTOPIC PREGNANCY  1997,1999    REMOVAL    • ENDOMETRIAL ABLATION W/ NOVASURE     • HERNIA REPAIR  1/2023   • LASER ABLATION  2012    ABNORMAL PERIODS/ DR. PRAJAPATI   • LEEP N/A 04/03/2017    Procedure: LOOP ELECTROCAUTERY EXCISION PROCEDURE;  Surgeon: Oly Rivera MD;  Location: [facility];  Service:    • LUMBAR DISCECTOMY Left 01/30/2023    Procedure: MINIMALLY INVASIVE LUMBAR DISCECTOMY, left approach, LUMBAR THREE-LUMBAR FOUR;  Surgeon: David Duran MD;  Location: [facility];  Service: Neurosurgery;  Laterality: Left;   • LUMBAR LAMINECTOMY Left 11/10/2021    Procedure: MINIMALLY INVASIVE LUMBAR LAMINECTOMY, LEFT APPROACH, LUMBAR 3-LUMBAR 4, LUMBAR 4-LUMBAR 5;  Surgeon: David Duran MD;  Location: [facility];  Service: Neurosurgery;  Laterality: Left;   • OTHER SURGICAL HISTORY Left     PARTIAL AMPUTATION OF LEFT INDEX FINGER   • TUBAL ABDOMINAL LIGATION  1999?    Left only      Family History   Problem Relation Age of Onset   • Liver disease Mother    • Arthritis Mother         Passed 4/2017   • Thyroid disease Mother    • Diabetes Father         MELLITUS   • Anxiety disorder Father         Passed 7/2021   • Hyperlipidemia Father    • Skin cancer Maternal Grandmother         [place] SKIN CANCER   • Skin cancer Other         BASAL CELL    • Stroke Other         Current Outpatient Medications:   •  calcium carbonate (OS-KALPAAN) 600 MG tablet, Take 1 tablet by mouth Daily., Disp: , Rfl:   •  gabapentin (Neurontin) 300 MG capsule, Take 1 capsule by mouth 3 (Three) Times a Day. (Patient taking  differently: Take 1 capsule by mouth 1 (One) Time.), Disp: 270 capsule, Rfl: 0  •  levothyroxine (SYNTHROID, LEVOTHROID) 25 MCG tablet, Take 1 tablet by mouth Daily., Disp: 90 tablet, Rfl: 1  •  multivitamin with minerals tablet tablet, Take 1 tablet by mouth Daily., Disp: , Rfl:   •  mupirocin (BACTROBAN) 2 % cream, Apply 1 application topically to the appropriate area as directed 3 (Three) Times a Day., Disp: , Rfl:   •  triamcinolone (KENALOG) 0.1 % cream, Apply 1 application topically to the appropriate area as directed As Needed (Itching)., Disp: 45 g, Rfl: 0   Assessment and Plan  Diagnoses and all orders for this visit:    1. Annual physical exam (Primary)  Comments:  Patient will have labs done.  Orders:  -     TSH; Future  -     Lipid panel; Future  -     CBC w AUTO Differential; Future  -     Comprehensive metabolic panel; Future    2. Hypothyroidism, unspecified type  Comments:  No changes at this time.  She will have labs drawn.  Orders:  -     levothyroxine (SYNTHROID, LEVOTHROID) 25 MCG tablet; Take 1 tablet by mouth Daily.  Dispense: 90 tablet; Refill: 1    3. Neuropathy  Comments:  Have sent in request to Dr. Brothers to fill 600 mg daily and 100 mg as needed for neuropathy.    4. Vitamin B 12 deficiency  Comments:  We will have this done with labs.  Orders:  -     Vitamin B12; Future    5. Medication refill  -     POC Urine Drug Screen [facility]      Healthy female exam.   Annual physical exam [Z00.00]     1.   2. Patient Counseling:  --Nutrition: Stressed importance of moderation in sodium/caffeine intake, saturated fat and cholesterol, caloric balance, sufficient intake of fresh fruits, vegetables, fiber, calcium, iron.  --Discussed the issue of estrogen replacement, calcium supplement, and the daily use of baby aspirin.  --Exercise: Stressed the importance of regular exercise.   --Injury prevention: Discussed safety belts, safety helmets, smoke detector, smoking near bedding or upholstery.    --Dental health: Discussed importance of regular tooth brushing, flossing, and dental visits.        3. Discussed the patient's BMI with her.  The BMI is above average; BMI management plan is completed  4. Follow up next physical in 1 year      The patient is advised to continue current medications, continue current healthy lifestyle patterns and return for routine annual checkups.

## 2023-05-26 ENCOUNTER — PATIENT ROUNDING (BHMG ONLY) (OUTPATIENT)
Dept: FAMILY MEDICINE CLINIC | Facility: CLINIC | Age: 53
End: 2023-05-26
Payer: COMMERCIAL

## 2023-07-11 ENCOUNTER — TELEPHONE (OUTPATIENT)
Dept: FAMILY MEDICINE CLINIC | Facility: CLINIC | Age: 53
End: 2023-07-11

## 2023-09-07 ENCOUNTER — OFFICE VISIT (OUTPATIENT)
Dept: FAMILY MEDICINE CLINIC | Facility: CLINIC | Age: 53
End: 2023-09-07
Payer: COMMERCIAL

## 2023-09-07 VITALS
SYSTOLIC BLOOD PRESSURE: 121 MMHG | WEIGHT: 151 LBS | BODY MASS INDEX: 22.88 KG/M2 | HEART RATE: 61 BPM | DIASTOLIC BLOOD PRESSURE: 64 MMHG | HEIGHT: 68 IN | OXYGEN SATURATION: 100 %

## 2023-09-07 DIAGNOSIS — E03.9 HYPOTHYROIDISM, UNSPECIFIED TYPE: ICD-10-CM

## 2023-09-07 DIAGNOSIS — Z13.220 LIPID SCREENING: ICD-10-CM

## 2023-09-07 DIAGNOSIS — G25.81 RLS (RESTLESS LEGS SYNDROME): ICD-10-CM

## 2023-09-07 DIAGNOSIS — E53.8 B12 DEFICIENCY: Primary | ICD-10-CM

## 2023-09-07 DIAGNOSIS — G47.00 INSOMNIA, UNSPECIFIED TYPE: ICD-10-CM

## 2023-09-07 PROBLEM — G62.9 NEUROPATHY: Status: ACTIVE | Noted: 2023-09-07

## 2023-09-07 PROCEDURE — 99214 OFFICE O/P EST MOD 30 MIN: CPT | Performed by: NURSE PRACTITIONER

## 2023-09-07 NOTE — PROGRESS NOTES
Chief Complaint  Establish Care, insomnia    SUBJECTIVE  Mercy Dalton presents to [facility]   Pt is here today to establish care.    Pt would like to discuss her sleeping issues but does not want any medication or anything like that yet.  Patient reports for the last 5 or 6 months she has been having some difficulty with sleeping, states that she has difficulty falling asleep and going back to sleep if she wakes up.  States that she is trying to get into a bedtime routine to see if this will help.    Former [facility] patient    Insomnia onset 6 months     History of Present Illness  Past Medical History:   Diagnosis Date    Allergic May, 2010    Skin allergies (all noted in record)    Allergic rhinitis     Anemia     TAKES OVER THE COUNTER IRON SUPPLEMENT    Anesthesia     HAD BEEN MARKED  AS DIFFICULT INTUBATION PREVIOUSLY ONLY ISSUE PT REPORTS IS HAD PONV WITH PREVIOUS SURGERY    Exercise-induced asthma     REPORTS HAS RESOLVED AND NO ISSUES CURRENTLY    Herniated nucleus pulposus, L3-4 left     HGSIL (high grade squamous intraepithelial lesion) on Pap smear of cervix     Hypothyroid     Low back pain On/Off 20 years    Lumbar stenosis     Neurogenic claudication     Pneumonia Double pneumonia 2018    DENIES ANY CURRENT ISSUES    PONV (postoperative nausea and vomiting)     RLS (restless legs syndrome) 07/12/2018    REPORTS THAT ON GABAPENTIN AND THAT HAS HELPED    Vertigo       Family History   Problem Relation Age of Onset    Liver disease Mother     Arthritis Mother         Passed 4/2017    Thyroid disease Mother     Diabetes Father         MELLITUS    Anxiety disorder Father         Passed 7/2021    Hyperlipidemia Father     Skin cancer Maternal Grandmother         UKNOWN SKIN CANCER    Skin cancer Other         BASAL CELL     Stroke Other       Past Surgical History:   Procedure Laterality Date    COLONOSCOPY  02/21/2020    POLYPS, INTERNAL HEMORRHOIDS    DIAGNOSTIC  "LAPAROSCOPY      ECTOPIC PREGNANCY  1997,1999    REMOVAL     ENDOMETRIAL ABLATION W/ NOVASURE      HERNIA REPAIR  1/2023    LASER ABLATION  2012    ABNORMAL PERIODS/ DR. ALO LAMBERT N/A 04/03/2017    Procedure: LOOP ELECTROCAUTERY EXCISION PROCEDURE;  Surgeon: Oly Rivera MD;  Location: [facility];  Service:     LUMBAR DISCECTOMY Left 01/30/2023    Procedure: MINIMALLY INVASIVE LUMBAR DISCECTOMY, left approach, LUMBAR THREE-LUMBAR FOUR;  Surgeon: David Duran MD;  Location: [facility] OR;  Service: Neurosurgery;  Laterality: Left;    LUMBAR LAMINECTOMY Left 11/10/2021    Procedure: MINIMALLY INVASIVE LUMBAR LAMINECTOMY, LEFT APPROACH, LUMBAR 3-LUMBAR 4, LUMBAR 4-LUMBAR 5;  Surgeon: David Duran MD;  Location: [facility] OR;  Service: Neurosurgery;  Laterality: Left;    OTHER SURGICAL HISTORY Left     PARTIAL AMPUTATION OF LEFT INDEX FINGER    TUBAL ABDOMINAL LIGATION  1999?    Left only        Current Outpatient Medications:     calcium carbonate (OS-KALPANA) 600 MG tablet, Take 1 tablet by mouth Daily., Disp: , Rfl:     desoximetasone (TOPICORT) 0.25 % cream, Apply to affected areas twice a day, for 2 weeks, take a one week break. Repeat if needed, Disp: 60 g, Rfl: 2    gabapentin (NEURONTIN) 100 MG capsule, Take 1 capsule by mouth As Needed (neuropathy.) for up to 30 days., Disp: 30 capsule, Rfl: 2    gabapentin (Neurontin) 300 MG capsule, Take 2 capsules by mouth Daily., Disp: 270 capsule, Rfl: 1    levothyroxine (SYNTHROID, LEVOTHROID) 25 MCG tablet, Take 1 tablet by mouth Daily., Disp: 90 tablet, Rfl: 1    multivitamin with minerals tablet tablet, Take 1 tablet by mouth Daily., Disp: , Rfl:     mupirocin (BACTROBAN) 2 % cream, Apply 1 application  topically to the appropriate area as directed 3 (Three) Times a Day., Disp: , Rfl:     OBJECTIVE  Vital Signs:   /64   Pulse 61   Ht 172.7 cm (68\")   Wt 68.5 kg (151 lb)   LMP  (LMP Unknown)   SpO2 100%   BMI 22.96 kg/m²    Estimated body " "mass index is 22.96 kg/m² as calculated from the following:    Height as of this encounter: 172.7 cm (68\").    Weight as of this encounter: 68.5 kg (151 lb).     Wt Readings from Last 3 Encounters:   09/07/23 68.5 kg (151 lb)   05/22/23 67.6 kg (149 lb)   01/30/23 68.6 kg (151 lb 3.8 oz)     BP Readings from Last 3 Encounters:   09/07/23 121/64   05/22/23 106/71   01/30/23 101/59       Physical Exam  Vitals reviewed.   Constitutional:       Appearance: Normal appearance. She is well-developed.   HENT:      Head: Normocephalic and atraumatic.      Right Ear: External ear normal.      Left Ear: External ear normal.   Eyes:      Conjunctiva/sclera: Conjunctivae normal.      Pupils: Pupils are equal, round, and reactive to light.   Cardiovascular:      Rate and Rhythm: Normal rate and regular rhythm.      Heart sounds: No murmur heard.    No friction rub. No gallop.   Pulmonary:      Effort: Pulmonary effort is normal.      Breath sounds: Normal breath sounds. No wheezing or rhonchi.   Skin:     General: Skin is warm and dry.   Neurological:      Mental Status: She is alert and oriented to person, place, and time.      Cranial Nerves: No cranial nerve deficit.   Psychiatric:         Mood and Affect: Mood and affect normal.         Behavior: Behavior normal.         Thought Content: Thought content normal.         Judgment: Judgment normal.        Result Review                     Lab Results   Component Value Date    NXEQQMAG34 1,227 (H) 03/24/2022       No Images in the past 120 days found..     The above data has been reviewed by JOSEPH Abdul 09/07/2023 11:02 EDT.          Patient Care Team:  Courtney Saul APRN as PCP - General (Nurse Practitioner)  Oly Rivera MD as Consulting Physician (Obstetrics and Gynecology)    BMI is within normal parameters. No other follow-up for BMI required.       ASSESSMENT & PLAN    Diagnoses and all orders for this visit:    1. B12 deficiency (Primary)  -     " Vitamin B12; Future    2. Lipid screening  -     Lipid Panel; Future    3. Hypothyroidism, unspecified type  Assessment & Plan:  Stable and well-controlled levothyroxine, continue current dose    Orders:  -     Comprehensive Metabolic Panel; Future  -     CBC & Differential; Future  -     TSH Rfx On Abnormal To Free T4; Future  -     T4, free; Future    4. RLS (restless legs syndrome)  Assessment & Plan:  Stable and well-controlled gabapentin, continue current dose    Orders:  -     Comprehensive Metabolic Panel; Future  -     CBC & Differential; Future    5. Insomnia, unspecified type  Assessment & Plan:  Discussed sleep hygiene, white noise machine, cool room, no lights, if no improvement may trial over-the-counter melatonin or as needed use of sleep aid, follow-up if no improvement           Tobacco Use: Low Risk     Smoking Tobacco Use: Never    Smokeless Tobacco Use: Never    Passive Exposure: Not on file       Follow Up     Return if symptoms worsen or fail to improve.        Patient was given instructions and counseling regarding her condition or for health maintenance advice. Please see specific information pulled into the AVS if appropriate.   I have reviewed information obtained and documented by others and I have confirmed the accuracy of this documented note.    JOSEPH Abdul

## 2023-09-07 NOTE — ASSESSMENT & PLAN NOTE
Discussed sleep hygiene, white noise machine, cool room, no lights, if no improvement may trial over-the-counter melatonin or as needed use of sleep aid, follow-up if no improvement

## 2023-09-11 ENCOUNTER — LAB (OUTPATIENT)
Dept: LAB | Facility: HOSPITAL | Age: 53
End: 2023-09-11
Payer: COMMERCIAL

## 2023-09-11 DIAGNOSIS — E03.9 HYPOTHYROIDISM, UNSPECIFIED TYPE: ICD-10-CM

## 2023-09-11 DIAGNOSIS — G25.81 RLS (RESTLESS LEGS SYNDROME): ICD-10-CM

## 2023-09-11 DIAGNOSIS — E53.8 B12 DEFICIENCY: ICD-10-CM

## 2023-09-11 DIAGNOSIS — Z13.220 LIPID SCREENING: ICD-10-CM

## 2023-09-11 LAB
ALBUMIN SERPL-MCNC: 4.4 G/DL (ref 3.5–5.2)
ALBUMIN/GLOB SERPL: 1.5 G/DL
ALP SERPL-CCNC: 50 U/L (ref 39–117)
ALT SERPL W P-5'-P-CCNC: 18 U/L (ref 1–33)
ANION GAP SERPL CALCULATED.3IONS-SCNC: 8.5 MMOL/L (ref 5–15)
AST SERPL-CCNC: 47 U/L (ref 1–32)
BASOPHILS # BLD AUTO: 0.08 10*3/MM3 (ref 0–0.2)
BASOPHILS NFR BLD AUTO: 1.4 % (ref 0–1.5)
BILIRUB SERPL-MCNC: 0.4 MG/DL (ref 0–1.2)
BUN SERPL-MCNC: 12 MG/DL (ref 6–20)
BUN/CREAT SERPL: 15.4 (ref 7–25)
CALCIUM SPEC-SCNC: 9.8 MG/DL (ref 8.6–10.5)
CHLORIDE SERPL-SCNC: 106 MMOL/L (ref 98–107)
CHOLEST SERPL-MCNC: 220 MG/DL (ref 0–200)
CO2 SERPL-SCNC: 28.5 MMOL/L (ref 22–29)
CREAT SERPL-MCNC: 0.78 MG/DL (ref 0.57–1)
DEPRECATED RDW RBC AUTO: 44.1 FL (ref 37–54)
EGFRCR SERPLBLD CKD-EPI 2021: 89.8 ML/MIN/1.73
EOSINOPHIL # BLD AUTO: 0.1 10*3/MM3 (ref 0–0.4)
EOSINOPHIL NFR BLD AUTO: 1.8 % (ref 0.3–6.2)
ERYTHROCYTE [DISTWIDTH] IN BLOOD BY AUTOMATED COUNT: 12.8 % (ref 12.3–15.4)
GLOBULIN UR ELPH-MCNC: 3 GM/DL
GLUCOSE SERPL-MCNC: 95 MG/DL (ref 65–99)
HCT VFR BLD AUTO: 37.3 % (ref 34–46.6)
HDLC SERPL-MCNC: 74 MG/DL (ref 40–60)
HGB BLD-MCNC: 12.7 G/DL (ref 12–15.9)
IMM GRANULOCYTES # BLD AUTO: 0.01 10*3/MM3 (ref 0–0.05)
IMM GRANULOCYTES NFR BLD AUTO: 0.2 % (ref 0–0.5)
LDLC SERPL CALC-MCNC: 136 MG/DL (ref 0–100)
LDLC/HDLC SERPL: 1.82 {RATIO}
LYMPHOCYTES # BLD AUTO: 2.48 10*3/MM3 (ref 0.7–3.1)
LYMPHOCYTES NFR BLD AUTO: 44.6 % (ref 19.6–45.3)
MCH RBC QN AUTO: 32 PG (ref 26.6–33)
MCHC RBC AUTO-ENTMCNC: 34 G/DL (ref 31.5–35.7)
MCV RBC AUTO: 94 FL (ref 79–97)
MONOCYTES # BLD AUTO: 0.41 10*3/MM3 (ref 0.1–0.9)
MONOCYTES NFR BLD AUTO: 7.4 % (ref 5–12)
NEUTROPHILS NFR BLD AUTO: 2.48 10*3/MM3 (ref 1.7–7)
NEUTROPHILS NFR BLD AUTO: 44.6 % (ref 42.7–76)
NRBC BLD AUTO-RTO: 0 /100 WBC (ref 0–0.2)
PLATELET # BLD AUTO: 293 10*3/MM3 (ref 140–450)
PMV BLD AUTO: 11 FL (ref 6–12)
POTASSIUM SERPL-SCNC: 4.4 MMOL/L (ref 3.5–5.2)
PROT SERPL-MCNC: 7.4 G/DL (ref 6–8.5)
RBC # BLD AUTO: 3.97 10*6/MM3 (ref 3.77–5.28)
SODIUM SERPL-SCNC: 143 MMOL/L (ref 136–145)
T4 FREE SERPL-MCNC: 1.08 NG/DL (ref 0.93–1.7)
TRIGL SERPL-MCNC: 56 MG/DL (ref 0–150)
TSH SERPL DL<=0.05 MIU/L-ACNC: 4 UIU/ML (ref 0.27–4.2)
VIT B12 BLD-MCNC: 613 PG/ML (ref 211–946)
VLDLC SERPL-MCNC: 10 MG/DL (ref 5–40)
WBC NRBC COR # BLD: 5.56 10*3/MM3 (ref 3.4–10.8)

## 2023-09-11 PROCEDURE — 84439 ASSAY OF FREE THYROXINE: CPT

## 2023-09-11 PROCEDURE — 36415 COLL VENOUS BLD VENIPUNCTURE: CPT

## 2023-09-11 PROCEDURE — 80061 LIPID PANEL: CPT

## 2023-09-11 PROCEDURE — 80050 GENERAL HEALTH PANEL: CPT

## 2023-09-11 PROCEDURE — 82607 VITAMIN B-12: CPT

## 2023-09-12 ENCOUNTER — TELEPHONE (OUTPATIENT)
Dept: FAMILY MEDICINE CLINIC | Facility: CLINIC | Age: 53
End: 2023-09-12
Payer: COMMERCIAL

## 2023-09-12 NOTE — TELEPHONE ENCOUNTER
Caller: Mercy Dalton    Relationship to patient: Self    Best call back number: [phone]     Patient is needing: PATIENT STATES THAT SHE WAS TOLD THAT SHE WOULD HAVE A LIVER ULTRASOUND SENT TO [facility]. PATIENT IS INQUIRING IF THIS HAS BEEN COMPLETED. PLEASE CALL TO ADVISE.

## 2023-09-20 DIAGNOSIS — R79.89 ELEVATED LFTS: Primary | ICD-10-CM

## 2023-10-17 DIAGNOSIS — R79.89 ELEVATED LFTS: ICD-10-CM

## 2023-10-18 ENCOUNTER — PATIENT MESSAGE (OUTPATIENT)
Dept: FAMILY MEDICINE CLINIC | Facility: CLINIC | Age: 53
End: 2023-10-18
Payer: COMMERCIAL

## 2023-10-18 DIAGNOSIS — G62.9 NEUROPATHY: ICD-10-CM

## 2023-10-18 RX ORDER — GABAPENTIN 300 MG/1
600 CAPSULE ORAL DAILY
Qty: 270 CAPSULE | Refills: 1 | Status: CANCELLED | OUTPATIENT
Start: 2023-10-18

## 2023-10-18 RX ORDER — GABAPENTIN 100 MG/1
100 CAPSULE ORAL AS NEEDED
Qty: 30 CAPSULE | Refills: 2 | Status: CANCELLED | OUTPATIENT
Start: 2023-10-18 | End: 2023-11-17

## 2023-10-18 NOTE — TELEPHONE ENCOUNTER
Both LRF on 05/22/23 by Dr. Brothers/ [facility]ino.     Are you ok to refill? Would need to get UDS and CSA updated as last done in May under Ana.

## 2023-10-18 NOTE — TELEPHONE ENCOUNTER
----- Message from Mercy Dalton sent at 10/18/2023  8:45 AM EDT -----  Regarding: Gabapentin  Contact: [phone]  I have 2 prescriptions for Gabapentin (used for restless legs syndrome in both legs and neuropathy), one for 300MGs (2 taken at bedtime) and 100MGs (1 taken as needed). I had been taking 600 MGs and supplementing with an additional 100MG or 200MG as needed when, after taking the 600MG dose, I would still have leg cramps. For the last several weeks I have noticed that up to 800MGs do not stop the leg cramps and have now needed to routinely take 3 of my 300MG tablets at bedtime, for a total of 900MGs. This past week, one night I even had to take an additional 2 100MG tablets to address leg cramps and restless leg issues (for a total of 1100MGs.)     At this point, my question is am I able to continue managing my gabapentin dose in this manner if it resolves my RLS/cramps? If it is, I will need a new prescription that allows me to take anywhere from 900MG up to 1200MG. I am comfortable going this route, it's what I have been doing for years (which is why I had the 100MGs prescription in the first place). If any questions, please give me a call ([phone].) Thank you!

## 2023-11-01 NOTE — TELEPHONE ENCOUNTER
Gabapentin is not really indicated for muscle cramps, which may be why it is not relieving them, as patient needs to update UDS anyway, I would recommend scheduling an appointment for discussion/evaluation, if patient is having muscle cramps or worsening restless leg, there are few things that I would like to check

## 2023-11-07 ENCOUNTER — TELEPHONE (OUTPATIENT)
Dept: FAMILY MEDICINE CLINIC | Facility: CLINIC | Age: 53
End: 2023-11-07
Payer: COMMERCIAL

## 2023-11-07 ENCOUNTER — TRANSCRIBE ORDERS (OUTPATIENT)
Dept: ADMINISTRATIVE | Facility: HOSPITAL | Age: 53
End: 2023-11-07
Payer: COMMERCIAL

## 2023-11-07 DIAGNOSIS — Z12.31 VISIT FOR SCREENING MAMMOGRAM: Primary | ICD-10-CM

## 2023-11-07 DIAGNOSIS — Z13.820 OSTEOPOROSIS SCREENING: Primary | ICD-10-CM

## 2023-11-07 DIAGNOSIS — G62.9 NEUROPATHY: ICD-10-CM

## 2023-11-07 RX ORDER — GABAPENTIN 300 MG/1
600 CAPSULE ORAL DAILY
Qty: 270 CAPSULE | Refills: 0 | Status: SHIPPED | OUTPATIENT
Start: 2023-11-07 | End: 2023-11-14

## 2023-11-07 NOTE — TELEPHONE ENCOUNTER
----- Message from Brittney Bahena MA sent at 11/7/2023 11:34 AM EST -----  Regarding: FW: Gabapentin  Contact: [phone]    ----- Message -----  From: Mercy Dalton  Sent: 11/7/2023   8:52 AM EST  To: [facility]  Subject: Gabapentin                                       I scheduled an appt with Xiao last week; the first date/time available was December 6th. I had a partial refill on my 300MG gabapentin capsules, which I submitted to the pharmacy. However, my insurance won't pay for any additional refills and will need a a new prescription sent in. Plus, I won't have enough to cover me until my appt on Dec 6th. Unsure of what to do at this point.

## 2023-11-07 NOTE — TELEPHONE ENCOUNTER
Refill of gabapentin sent, will discuss the rest at office visit.  Can also offer patient an earlier appointment if there is an open slot anywhere in my schedule next week.

## 2023-11-14 ENCOUNTER — OFFICE VISIT (OUTPATIENT)
Dept: FAMILY MEDICINE CLINIC | Facility: CLINIC | Age: 53
End: 2023-11-14
Payer: COMMERCIAL

## 2023-11-14 VITALS
BODY MASS INDEX: 23.19 KG/M2 | DIASTOLIC BLOOD PRESSURE: 72 MMHG | HEIGHT: 68 IN | WEIGHT: 153 LBS | OXYGEN SATURATION: 100 % | HEART RATE: 68 BPM | SYSTOLIC BLOOD PRESSURE: 109 MMHG

## 2023-11-14 DIAGNOSIS — G62.9 NEUROPATHY: ICD-10-CM

## 2023-11-14 DIAGNOSIS — G25.81 RLS (RESTLESS LEGS SYNDROME): ICD-10-CM

## 2023-11-14 DIAGNOSIS — R25.2 MUSCLE CRAMPS: ICD-10-CM

## 2023-11-14 DIAGNOSIS — Z79.899 LONG TERM USE OF DRUG: Primary | ICD-10-CM

## 2023-11-14 PROBLEM — M54.16 LUMBAR RADICULOPATHY: Status: ACTIVE | Noted: 2023-11-14

## 2023-11-14 RX ORDER — GABAPENTIN 600 MG/1
600 TABLET ORAL NIGHTLY
Qty: 90 TABLET | Refills: 1 | Status: SHIPPED | OUTPATIENT
Start: 2023-11-14

## 2023-11-14 RX ORDER — BACLOFEN 10 MG/1
10 TABLET ORAL NIGHTLY
Qty: 30 TABLET | Refills: 2 | Status: SHIPPED | OUTPATIENT
Start: 2023-11-14

## 2023-11-14 RX ORDER — GABAPENTIN 100 MG/1
100 CAPSULE ORAL AS NEEDED
Qty: 90 CAPSULE | Refills: 1 | Status: SHIPPED | OUTPATIENT
Start: 2023-11-14

## 2023-11-14 NOTE — ASSESSMENT & PLAN NOTE
checking a vitamin D and magnesium level, we will trial baclofen as needed, follow-up if no improvement

## 2023-11-14 NOTE — PROGRESS NOTES
Chief Complaint  Restless legs, muscle cramping,    SUBJECTIVE  Mercy Dalton presents to [facility]     Patient presents today to follow-up on restless legs, muscle cramping, states that she has been on gabapentin for some time for restless legs and muscle cramping, states that she has had to increase to 600 mg and is frequently having to take 100 mg tablet along with the due to an increase in muscle cramping.  Patient states it varies from day to day, some days she will have no issues, some days she has more restless leg, some days she has muscle cramps.    States she has tried Requip in the past and it did not seem to help much.    History of Present Illness  Pt here to discuss gabapentin dosage. UDS and consent completed.     Pt is due for vaccines and is aware.     Past Medical History:   Diagnosis Date    Allergic May, 2010    Skin allergies (all noted in record)    Allergic rhinitis     Anemia     TAKES OVER THE COUNTER IRON SUPPLEMENT    Anesthesia     HAD BEEN MARKED  AS DIFFICULT INTUBATION PREVIOUSLY ONLY ISSUE PT REPORTS IS HAD PONV WITH PREVIOUS SURGERY    Exercise-induced asthma     REPORTS HAS RESOLVED AND NO ISSUES CURRENTLY    Herniated nucleus pulposus, L3-4 left     HGSIL (high grade squamous intraepithelial lesion) on Pap smear of cervix     Hypothyroid     Low back pain On/Off 20 years    Lumbar stenosis     Neurogenic claudication     Pneumonia Double pneumonia 2018    DENIES ANY CURRENT ISSUES    PONV (postoperative nausea and vomiting)     RLS (restless legs syndrome) 07/12/2018    REPORTS THAT ON GABAPENTIN AND THAT HAS HELPED    Vertigo       Family History   Problem Relation Age of Onset    Liver disease Mother     Arthritis Mother         Passed 4/2017    Thyroid disease Mother     Diabetes Father         MELLITUS    Anxiety disorder Father         Passed 7/2021    Hyperlipidemia Father     Skin cancer Maternal Grandmother         UKNOWN SKIN CANCER     Skin cancer Other         BASAL CELL     Stroke Other       Past Surgical History:   Procedure Laterality Date    COLONOSCOPY  02/21/2020    POLYPS, INTERNAL HEMORRHOIDS    DIAGNOSTIC LAPAROSCOPY      ECTOPIC PREGNANCY  1997,1999    REMOVAL     ENDOMETRIAL ABLATION W/ NOVASURE      HERNIA REPAIR  1/2023    LASER ABLATION  2012    ABNORMAL PERIODS/ DR. ALO LAMBERT N/A 04/03/2017    Procedure: LOOP ELECTROCAUTERY EXCISION PROCEDURE;  Surgeon: Oly Rivera MD;  Location: [facility];  Service:     LUMBAR DISCECTOMY Left 01/30/2023    Procedure: MINIMALLY INVASIVE LUMBAR DISCECTOMY, left approach, LUMBAR THREE-LUMBAR FOUR;  Surgeon: David Duran MD;  Location: [facility] OR;  Service: Neurosurgery;  Laterality: Left;    LUMBAR LAMINECTOMY Left 11/10/2021    Procedure: MINIMALLY INVASIVE LUMBAR LAMINECTOMY, LEFT APPROACH, LUMBAR 3-LUMBAR 4, LUMBAR 4-LUMBAR 5;  Surgeon: David Duran MD;  Location: [facility] OR;  Service: Neurosurgery;  Laterality: Left;    OTHER SURGICAL HISTORY Left     PARTIAL AMPUTATION OF LEFT INDEX FINGER    TUBAL ABDOMINAL LIGATION  1999?    Left only        Current Outpatient Medications:     calcium carbonate (OS-KALPANA) 600 MG tablet, Take 1 tablet by mouth Daily., Disp: , Rfl:     desoximetasone (TOPICORT) 0.25 % cream, Apply to affected areas twice a day, for 2 weeks, take a one week break. Repeat if needed, Disp: 60 g, Rfl: 2    gabapentin (NEURONTIN) 100 MG capsule, Take 1 capsule by mouth As Needed (neuropathy.)., Disp: 90 capsule, Rfl: 1    levothyroxine (SYNTHROID, LEVOTHROID) 25 MCG tablet, Take 1 tablet by mouth Daily., Disp: 90 tablet, Rfl: 1    mupirocin (BACTROBAN) 2 % cream, Apply 1 application  topically to the appropriate area as directed 3 (Three) Times a Day., Disp: , Rfl:     baclofen (LIORESAL) 10 MG tablet, Take 1 tablet by mouth Every Night., Disp: 30 tablet, Rfl: 2    gabapentin (NEURONTIN) 600 MG tablet, Take 1 tablet by mouth Every Night., Disp: 90 tablet,  "Rfl: 1    OBJECTIVE  Vital Signs:   /72   Pulse 68   Ht 172.7 cm (68\")   Wt 69.4 kg (153 lb)   LMP  (LMP Unknown)   SpO2 100%   BMI 23.26 kg/m²    Estimated body mass index is 23.26 kg/m² as calculated from the following:    Height as of this encounter: 172.7 cm (68\").    Weight as of this encounter: 69.4 kg (153 lb).     Wt Readings from Last 3 Encounters:   11/14/23 69.4 kg (153 lb)   09/07/23 68.5 kg (151 lb)   05/22/23 67.6 kg (149 lb)     BP Readings from Last 3 Encounters:   11/14/23 109/72   09/07/23 121/64   05/22/23 106/71       Physical Exam  Vitals reviewed.   Constitutional:       Appearance: Normal appearance. She is well-developed.   HENT:      Head: Normocephalic and atraumatic.      Right Ear: External ear normal.      Left Ear: External ear normal.   Eyes:      Conjunctiva/sclera: Conjunctivae normal.      Pupils: Pupils are equal, round, and reactive to light.   Cardiovascular:      Rate and Rhythm: Normal rate and regular rhythm.      Heart sounds: No murmur heard.     No friction rub. No gallop.   Pulmonary:      Effort: Pulmonary effort is normal.      Breath sounds: Normal breath sounds. No wheezing or rhonchi.   Skin:     General: Skin is warm and dry.   Neurological:      Mental Status: She is alert and oriented to person, place, and time.      Cranial Nerves: No cranial nerve deficit.   Psychiatric:         Mood and Affect: Mood and affect normal.         Behavior: Behavior normal.         Thought Content: Thought content normal.         Judgment: Judgment normal.        Result Review    CMP          9/11/2023    09:14   CMP   Glucose 95    BUN 12    Creatinine 0.78    EGFR 89.8    Sodium 143    Potassium 4.4    Chloride 106    Calcium 9.8    Total Protein 7.4    Albumin 4.4    Globulin 3.0    Total Bilirubin 0.4    Alkaline Phosphatase 50    AST (SGOT) 47    ALT (SGPT) 18    Albumin/Globulin Ratio 1.5    BUN/Creatinine Ratio 15.4    Anion Gap 8.5      CBC          9/11/2023    " 09:14   CBC   WBC 5.56    RBC 3.97    Hemoglobin 12.7    Hematocrit 37.3    MCV 94.0    MCH 32.0    MCHC 34.0    RDW 12.8    Platelets 293      Lipid Panel          9/11/2023    09:14   Lipid Panel   Total Cholesterol 220    Triglycerides 56    HDL Cholesterol 74    VLDL Cholesterol 10    LDL Cholesterol  136    LDL/HDL Ratio 1.82      TSH          9/11/2023    09:14   TSH   TSH 4.000        Lab Results   Component Value Date    XRPC02JU 50.7 03/24/2022       No Images in the past 120 days found..     The above data has been reviewed by JOSEPH Abdul 11/14/2023 08:32 EST.          Patient Care Team:  Courtney Saul APRN as PCP - General (Nurse Practitioner)  Oly Rivera MD as Consulting Physician (Obstetrics and Gynecology)    BMI is within normal parameters. No other follow-up for BMI required.       ASSESSMENT & PLAN    Diagnoses and all orders for this visit:    1. Long term use of drug (Primary)  -     POC Urine Drug Screen [facility]    2. RLS (restless legs syndrome)  Assessment & Plan:  We will change gabapentin to 600 mg tablet once, with an additional 100 mg tablet for as needed, we are checking iron panel, patient will follow-up if no improvement    Orders:  -     gabapentin (NEURONTIN) 600 MG tablet; Take 1 tablet by mouth Every Night.  Dispense: 90 tablet; Refill: 1    3. Muscle cramps  Assessment & Plan:  checking a vitamin D and magnesium level, we will trial baclofen as needed, follow-up if no improvement    Orders:  -     Vitamin D 25 hydroxy; Future  -     Iron Profile; Future  -     Magnesium; Future  -     baclofen (LIORESAL) 10 MG tablet; Take 1 tablet by mouth Every Night.  Dispense: 30 tablet; Refill: 2    4. Neuropathy  -     gabapentin (NEURONTIN) 100 MG capsule; Take 1 capsule by mouth As Needed (neuropathy.).  Dispense: 90 capsule; Refill: 1  -     gabapentin (NEURONTIN) 600 MG tablet; Take 1 tablet by mouth Every Night.  Dispense: 90 tablet; Refill: 1          Tobacco Use: Low Risk  (11/14/2023)    Patient History     Smoking Tobacco Use: Never     Smokeless Tobacco Use: Never     Passive Exposure: Not on file       Follow Up     Return in about 3 months (around 2/14/2024), or if symptoms worsen or fail to improve.        Patient was given instructions and counseling regarding her condition or for health maintenance advice. Please see specific information pulled into the AVS if appropriate.   I have reviewed information obtained and documented by others and I have confirmed the accuracy of this documented note.    Courtney Saul APRN

## 2023-11-14 NOTE — ASSESSMENT & PLAN NOTE
We will change gabapentin to 600 mg tablet once, with an additional 100 mg tablet for as needed, we are checking iron panel, patient will follow-up if no improvement

## 2023-11-20 ENCOUNTER — LAB (OUTPATIENT)
Dept: LAB | Facility: HOSPITAL | Age: 53
End: 2023-11-20
Payer: COMMERCIAL

## 2023-11-20 DIAGNOSIS — R25.2 MUSCLE CRAMPS: ICD-10-CM

## 2023-11-20 LAB
25(OH)D3 SERPL-MCNC: 46.7 NG/ML (ref 30–100)
IRON 24H UR-MRATE: 94 MCG/DL (ref 37–145)
IRON SATN MFR SERPL: 25 % (ref 20–50)
MAGNESIUM SERPL-MCNC: 2.4 MG/DL (ref 1.6–2.6)
TIBC SERPL-MCNC: 375 MCG/DL (ref 298–536)
TRANSFERRIN SERPL-MCNC: 252 MG/DL (ref 200–360)

## 2023-11-20 PROCEDURE — 84466 ASSAY OF TRANSFERRIN: CPT

## 2023-11-20 PROCEDURE — 82306 VITAMIN D 25 HYDROXY: CPT

## 2023-11-20 PROCEDURE — 36415 COLL VENOUS BLD VENIPUNCTURE: CPT

## 2023-11-20 PROCEDURE — 83540 ASSAY OF IRON: CPT

## 2023-11-20 PROCEDURE — 83735 ASSAY OF MAGNESIUM: CPT

## 2024-01-01 DIAGNOSIS — E03.9 HYPOTHYROIDISM, UNSPECIFIED TYPE: ICD-10-CM

## 2024-01-02 RX ORDER — LEVOTHYROXINE SODIUM 0.03 MG/1
25 TABLET ORAL DAILY
Qty: 90 TABLET | Refills: 1 | Status: SHIPPED | OUTPATIENT
Start: 2024-01-02

## 2024-01-02 NOTE — TELEPHONE ENCOUNTER
LRF 05/22/2023  LOV 11/14/2023  F/U none on file    Patient requests all Lab and Radiology Results on their Discharge Instructions

## 2024-01-10 ENCOUNTER — TELEPHONE (OUTPATIENT)
Dept: FAMILY MEDICINE CLINIC | Facility: CLINIC | Age: 54
End: 2024-01-10
Payer: COMMERCIAL

## 2024-01-10 DIAGNOSIS — M79.671 RIGHT FOOT PAIN: ICD-10-CM

## 2024-01-10 DIAGNOSIS — G57.91 NEUROPATHY OF RIGHT FOOT: Primary | ICD-10-CM

## 2024-01-10 NOTE — TELEPHONE ENCOUNTER
Spoke with pt in regards to yesterdays MyChart message. She is needing a referral to foot and ankle specialist. Dr Baldwin no longer takes her insurance.   [facility] does and Dr Lagunas has privileges at [facility] if pt were to need any type of surgery.     Please sign referral to Dr Lagunas for right foot nerve pain.

## 2024-01-10 NOTE — TELEPHONE ENCOUNTER
Caller: Mercy Dalton    Relationship: Self    Best call back number: [phone]    What was the call regarding: PATIENT IS REQUESTING CALL FROM ROSI OR HER ASSISTANT.

## 2024-02-19 ENCOUNTER — HOSPITAL ENCOUNTER (OUTPATIENT)
Dept: BONE DENSITY | Facility: HOSPITAL | Age: 54
Discharge: HOME | End: 2024-02-19
Payer: COMMERCIAL

## 2024-02-19 ENCOUNTER — HOSPITAL ENCOUNTER (OUTPATIENT)
Dept: MAMMOGRAPHY | Facility: HOSPITAL | Age: 54
Discharge: HOME | End: 2024-02-19
Payer: COMMERCIAL

## 2024-02-19 DIAGNOSIS — Z12.31 VISIT FOR SCREENING MAMMOGRAM: ICD-10-CM

## 2024-02-19 DIAGNOSIS — Z13.820 OSTEOPOROSIS SCREENING: ICD-10-CM

## 2024-02-19 PROCEDURE — 77080 DXA BONE DENSITY AXIAL: CPT

## 2024-02-19 PROCEDURE — 77067 SCR MAMMO BI INCL CAD: CPT

## 2024-02-19 PROCEDURE — 77063 BREAST TOMOSYNTHESIS BI: CPT

## 2024-02-20 DIAGNOSIS — R25.2 MUSCLE CRAMPS: ICD-10-CM

## 2024-02-20 RX ORDER — BACLOFEN 10 MG/1
10 TABLET ORAL NIGHTLY
Qty: 30 TABLET | Refills: 2 | Status: SHIPPED | OUTPATIENT
Start: 2024-02-20

## 2024-04-24 DIAGNOSIS — R79.89 ELEVATED LFTS: Primary | ICD-10-CM

## 2024-05-02 ENCOUNTER — LAB (OUTPATIENT)
Dept: LAB | Facility: HOSPITAL | Age: 54
End: 2024-05-02
Payer: COMMERCIAL

## 2024-05-02 DIAGNOSIS — R79.89 ELEVATED LFTS: ICD-10-CM

## 2024-05-02 LAB
ALBUMIN SERPL-MCNC: 4.7 G/DL (ref 3.5–5.2)
ALP SERPL-CCNC: 59 U/L (ref 39–117)
ALT SERPL W P-5'-P-CCNC: 17 U/L (ref 1–33)
AST SERPL-CCNC: 43 U/L (ref 1–32)
BILIRUB CONJ SERPL-MCNC: <0.2 MG/DL (ref 0–0.3)
BILIRUB INDIRECT SERPL-MCNC: ABNORMAL MG/DL
BILIRUB SERPL-MCNC: 0.3 MG/DL (ref 0–1.2)
PROT SERPL-MCNC: 7.5 G/DL (ref 6–8.5)

## 2024-05-02 PROCEDURE — 80076 HEPATIC FUNCTION PANEL: CPT

## 2024-05-02 PROCEDURE — 36415 COLL VENOUS BLD VENIPUNCTURE: CPT

## 2024-05-09 DIAGNOSIS — G62.9 NEUROPATHY: ICD-10-CM

## 2024-05-09 DIAGNOSIS — G25.81 RLS (RESTLESS LEGS SYNDROME): ICD-10-CM

## 2024-05-09 RX ORDER — GABAPENTIN 600 MG/1
600 TABLET ORAL NIGHTLY
Qty: 90 TABLET | Refills: 0 | Status: SHIPPED | OUTPATIENT
Start: 2024-05-09

## 2024-05-20 DIAGNOSIS — R25.2 MUSCLE CRAMPS: ICD-10-CM

## 2024-05-20 RX ORDER — BACLOFEN 10 MG/1
10 TABLET ORAL NIGHTLY
Qty: 30 TABLET | Refills: 2 | Status: SHIPPED | OUTPATIENT
Start: 2024-05-20

## 2024-07-10 ENCOUNTER — PATIENT MESSAGE (OUTPATIENT)
Dept: FAMILY MEDICINE CLINIC | Facility: CLINIC | Age: 54
End: 2024-07-10
Payer: COMMERCIAL

## 2024-07-12 ENCOUNTER — TELEPHONE (OUTPATIENT)
Dept: FAMILY MEDICINE CLINIC | Facility: CLINIC | Age: 54
End: 2024-07-12
Payer: COMMERCIAL

## 2024-07-12 DIAGNOSIS — R25.2 MUSCLE CRAMPS: ICD-10-CM

## 2024-07-12 RX ORDER — BACLOFEN 10 MG/1
10 TABLET ORAL NIGHTLY
Qty: 90 TABLET | Refills: 1 | Status: SHIPPED | OUTPATIENT
Start: 2024-07-12

## 2024-07-23 ENCOUNTER — OFFICE VISIT (OUTPATIENT)
Dept: FAMILY MEDICINE CLINIC | Facility: CLINIC | Age: 54
End: 2024-07-23
Payer: COMMERCIAL

## 2024-07-23 VITALS
BODY MASS INDEX: 23.04 KG/M2 | DIASTOLIC BLOOD PRESSURE: 51 MMHG | SYSTOLIC BLOOD PRESSURE: 108 MMHG | HEART RATE: 67 BPM | HEIGHT: 68 IN | OXYGEN SATURATION: 97 % | WEIGHT: 152 LBS

## 2024-07-23 DIAGNOSIS — G62.9 NEUROPATHY: ICD-10-CM

## 2024-07-23 DIAGNOSIS — G25.81 RLS (RESTLESS LEGS SYNDROME): ICD-10-CM

## 2024-07-23 DIAGNOSIS — L08.9 LOCAL SKIN INFECTION: ICD-10-CM

## 2024-07-23 DIAGNOSIS — Z13.220 LIPID SCREENING: ICD-10-CM

## 2024-07-23 DIAGNOSIS — Z00.00 ANNUAL PHYSICAL EXAM: Primary | ICD-10-CM

## 2024-07-23 DIAGNOSIS — E03.9 HYPOTHYROIDISM, UNSPECIFIED TYPE: ICD-10-CM

## 2024-07-23 PROCEDURE — 99213 OFFICE O/P EST LOW 20 MIN: CPT | Performed by: NURSE PRACTITIONER

## 2024-07-23 PROCEDURE — 99396 PREV VISIT EST AGE 40-64: CPT | Performed by: NURSE PRACTITIONER

## 2024-07-23 PROCEDURE — 87070 CULTURE OTHR SPECIMN AEROBIC: CPT | Performed by: NURSE PRACTITIONER

## 2024-07-23 PROCEDURE — 87205 SMEAR GRAM STAIN: CPT | Performed by: NURSE PRACTITIONER

## 2024-07-23 RX ORDER — GABAPENTIN 600 MG/1
600 TABLET ORAL NIGHTLY
Qty: 90 TABLET | Refills: 1 | Status: SHIPPED | OUTPATIENT
Start: 2024-07-23

## 2024-07-23 RX ORDER — LEVOTHYROXINE SODIUM 0.03 MG/1
25 TABLET ORAL DAILY
Qty: 90 TABLET | Refills: 1 | Status: SHIPPED | OUTPATIENT
Start: 2024-07-23

## 2024-07-23 NOTE — PROGRESS NOTES
Chief Complaint  Hypothyroidism, Restless Legs Syndrome, Muscle Pain, and Annual Exam    SUBJECTIVE  Mercy Dalton presents to [facility] for annual exam and for six month follow up on Hypothyroidism, Restless Legs Syndrome, and Muscle Pain.    Pap completed last November at Dr Gonzalez's office. Pt reports it was normal.     Patient reports for the last 2 days she has been having discharge from her umbilicus.  States it is a bit swollen has had a little scabbed area inside for some time, but the drainage is new.     History of Present Illness  Past Medical History:   Diagnosis Date    Allergic May, 2010    Skin allergies (all noted in record)    Allergic rhinitis     Anemia     TAKES OVER THE COUNTER IRON SUPPLEMENT    Anesthesia     HAD BEEN MARKED  AS DIFFICULT INTUBATION PREVIOUSLY ONLY ISSUE PT REPORTS IS HAD PONV WITH PREVIOUS SURGERY    Exercise-induced asthma     REPORTS HAS RESOLVED AND NO ISSUES CURRENTLY    Herniated nucleus pulposus, L3-4 left     HGSIL (high grade squamous intraepithelial lesion) on Pap smear of cervix     Hypothyroid     Low back pain On/Off 20 years    Lumbar stenosis     Neurogenic claudication     Neuromuscular disorder approx 2014    restless leg syndrome    Pneumonia Double pneumonia 2018    DENIES ANY CURRENT ISSUES    PONV (postoperative nausea and vomiting)     RLS (restless legs syndrome) 07/12/2018    REPORTS THAT ON GABAPENTIN AND THAT HAS HELPED    Vertigo       Family History   Problem Relation Age of Onset    Liver disease Mother         Passed 3/2007    Arthritis Mother         Passed 4/2007    Thyroid disease Mother         Passed 3/2007    Diabetes Father         Passed 7/2021    Anxiety disorder Father         Passed 7/2021    Hyperlipidemia Father         Passed 7/2021    Skin cancer Maternal Grandmother         UKNOWN SKIN CANCER    Skin cancer Other         BASAL CELL     Stroke Other       Past Surgical History:   Procedure  "Laterality Date    COLONOSCOPY  02/21/2020    POLYPS, INTERNAL HEMORRHOIDS    DIAGNOSTIC LAPAROSCOPY      ECTOPIC PREGNANCY  1997,1999    REMOVAL     ENDOMETRIAL ABLATION W/ NOVASURE      HERNIA REPAIR  1/2023    LASER ABLATION  2012    ABNORMAL PERIODS/ DR. ALO LAMBERT N/A 04/03/2017    Procedure: LOOP ELECTROCAUTERY EXCISION PROCEDURE;  Surgeon: Oly Rivera MD;  Location: [facility];  Service:     LUMBAR DISCECTOMY Left 01/30/2023    Procedure: MINIMALLY INVASIVE LUMBAR DISCECTOMY, left approach, LUMBAR THREE-LUMBAR FOUR;  Surgeon: David Duran MD;  Location: [facility] OR;  Service: Neurosurgery;  Laterality: Left;    LUMBAR LAMINECTOMY Left 11/10/2021    Procedure: MINIMALLY INVASIVE LUMBAR LAMINECTOMY, LEFT APPROACH, LUMBAR 3-LUMBAR 4, LUMBAR 4-LUMBAR 5;  Surgeon: David Duran MD;  Location: [facility] OR;  Service: Neurosurgery;  Laterality: Left;    OTHER SURGICAL HISTORY Left     PARTIAL AMPUTATION OF LEFT INDEX FINGER    TUBAL ABDOMINAL LIGATION  1999?    Left only        Current Outpatient Medications:     baclofen (LIORESAL) 10 MG tablet, Take 1 tablet by mouth Every Night., Disp: 90 tablet, Rfl: 1    calcium carbonate (OS-KALPANA) 600 MG tablet, Take 1 tablet by mouth Daily., Disp: , Rfl:     desoximetasone (TOPICORT) 0.25 % cream, Apply to affected areas twice a day, for 2 weeks, take a one week break. Repeat if needed, Disp: 60 g, Rfl: 2    gabapentin (NEURONTIN) 100 MG capsule, Take 1 capsule by mouth As Needed (neuropathy.)., Disp: 90 capsule, Rfl: 1    gabapentin (NEURONTIN) 600 MG tablet, Take 1 tablet by mouth Every Night., Disp: 90 tablet, Rfl: 1    levothyroxine (SYNTHROID, LEVOTHROID) 25 MCG tablet, Take 1 tablet by mouth Daily., Disp: 90 tablet, Rfl: 1    mupirocin (BACTROBAN) 2 % cream, Apply 1 Application topically to the appropriate area as directed 3 (Three) Times a Day., Disp: , Rfl:     OBJECTIVE  Vital Signs:   /51   Pulse 67   Ht 172.7 cm (68\")   Wt 68.9 kg " "(152 lb)   LMP  (LMP Unknown)   SpO2 97%   BMI 23.11 kg/m²    Estimated body mass index is 23.11 kg/m² as calculated from the following:    Height as of this encounter: 172.7 cm (68\").    Weight as of this encounter: 68.9 kg (152 lb).     Wt Readings from Last 3 Encounters:   07/23/24 68.9 kg (152 lb)   11/14/23 69.4 kg (153 lb)   09/07/23 68.5 kg (151 lb)     BP Readings from Last 3 Encounters:   07/23/24 108/51   11/14/23 109/72   09/07/23 121/64       Physical Exam  Vitals reviewed.   Constitutional:       General: She is not in acute distress.     Appearance: Normal appearance. She is well-developed. She is not diaphoretic.   HENT:      Head: Normocephalic and atraumatic. Hair is normal.      Right Ear: Hearing, tympanic membrane, ear canal and external ear normal. No decreased hearing noted. No drainage.      Left Ear: Hearing, tympanic membrane, ear canal and external ear normal. No decreased hearing noted.      Nose: Nose normal. No nasal deformity.      Mouth/Throat:      Mouth: Mucous membranes are moist.   Eyes:      General: Lids are normal.         Right eye: No discharge.         Left eye: No discharge.      Extraocular Movements: Extraocular movements intact.      Conjunctiva/sclera: Conjunctivae normal.      Pupils: Pupils are equal, round, and reactive to light.   Neck:      Thyroid: No thyromegaly.      Vascular: No JVD.   Cardiovascular:      Rate and Rhythm: Normal rate and regular rhythm.      Pulses: Normal pulses.      Heart sounds: Normal heart sounds. No murmur heard.     No friction rub. No gallop.   Pulmonary:      Effort: Pulmonary effort is normal. No respiratory distress.      Breath sounds: Normal breath sounds. No wheezing or rales.   Chest:      Chest wall: No tenderness.   Abdominal:      General: Bowel sounds are normal. There is no distension.      Palpations: Abdomen is soft. There is no mass.      Tenderness: There is no abdominal tenderness. There is no guarding or rebound. "      Hernia: No hernia is present.   Musculoskeletal:         General: No tenderness or deformity. Normal range of motion.      Cervical back: Normal range of motion and neck supple.   Lymphadenopathy:      Cervical: No cervical adenopathy.   Skin:     General: Skin is warm and dry.      Findings: No erythema or rash.      Comments: Umbilicus is deep, skin appears erythematous/irritated and small amt purulent drainage noted.  Swab obtained for culture   Neurological:      Mental Status: She is alert and oriented to person, place, and time.      Cranial Nerves: No cranial nerve deficit.      Motor: No abnormal muscle tone.      Coordination: Coordination normal.      Deep Tendon Reflexes: Reflexes are normal and symmetric. Reflexes normal.   Psychiatric:         Mood and Affect: Mood normal.         Behavior: Behavior normal.         Thought Content: Thought content normal.         Judgment: Judgment normal.          Result Review    CMP          9/11/2023    09:14 5/2/2024    13:56   CMP   Glucose 95     BUN 12     Creatinine 0.78     EGFR 89.8     Sodium 143     Potassium 4.4     Chloride 106     Calcium 9.8     Total Protein 7.4  7.5    Albumin 4.4  4.7    Globulin 3.0     Total Bilirubin 0.4  0.3    Alkaline Phosphatase 50  59    AST (SGOT) 47  43    ALT (SGPT) 18  17    Albumin/Globulin Ratio 1.5     BUN/Creatinine Ratio 15.4     Anion Gap 8.5       CBC          9/11/2023    09:14   CBC   WBC 5.56    RBC 3.97    Hemoglobin 12.7    Hematocrit 37.3    MCV 94.0    MCH 32.0    MCHC 34.0    RDW 12.8    Platelets 293      Lipid Panel          9/11/2023    09:14   Lipid Panel   Total Cholesterol 220    Triglycerides 56    HDL Cholesterol 74    VLDL Cholesterol 10    LDL Cholesterol  136    LDL/HDL Ratio 1.82      TSH          9/11/2023    09:14   TSH   TSH 4.000        No Images in the past 120 days found..     The above data has been reviewed by JOSEPH Abdul 07/23/2024 13:33 EDT.          Patient Care  Team:  Courtney Saul APRN as PCP - General (Nurse Practitioner)  Oly Rivera MD as Consulting Physician (Obstetrics and Gynecology)  Joanne Mcfarland APRN as Nurse Practitioner (Nurse Practitioner)    BMI is within normal parameters. No other follow-up for BMI required.       ASSESSMENT & PLAN    Diagnoses and all orders for this visit:    1. Annual physical exam (Primary)  -     Comprehensive Metabolic Panel; Future  -     CBC & Differential; Future  -     Lipid Panel; Future  -     TSH Rfx On Abnormal To Free T4; Future  -     T4, free; Future    2. RLS (restless legs syndrome)  Assessment & Plan:  Stable well-controlled gabapentin, continue current medication    Orders:  -     gabapentin (NEURONTIN) 600 MG tablet; Take 1 tablet by mouth Every Night.  Dispense: 90 tablet; Refill: 1    3. Neuropathy  -     gabapentin (NEURONTIN) 600 MG tablet; Take 1 tablet by mouth Every Night.  Dispense: 90 tablet; Refill: 1    4. Hypothyroidism, unspecified type  Comments:  No changes at this time.  She will have labs drawn.  Assessment & Plan:  Stable and well-controlled levothyroxine, continue current medication    Orders:  -     levothyroxine (SYNTHROID, LEVOTHROID) 25 MCG tablet; Take 1 tablet by mouth Daily.  Dispense: 90 tablet; Refill: 1  -     TSH Rfx On Abnormal To Free T4; Future  -     T4, free; Future    5. Lipid screening  -     Lipid Panel; Future    6. Local skin infection  Comments:  Patient has Bactroban ointment per her report, she will apply this twice daily for 7 days pending culture results  Orders:  -     Wound Culture - Drainage, Abdominal Wall; Future  -     Wound Culture - Drainage, Abdominal Wall         Tobacco Use: Low Risk  (11/14/2023)    Patient History     Smoking Tobacco Use: Never     Smokeless Tobacco Use: Never     Passive Exposure: Not on file     The patient is advised to follow healthy diet and exercise, maintain healthy weight, continue current medications, continue  current healthy lifestyle patterns, and return for routine annual checkups.      Follow Up     Return in about 6 months (around 1/23/2025), or if symptoms worsen or fail to improve.        Patient was given instructions and counseling regarding her condition or for health maintenance advice. Please see specific information pulled into the AVS if appropriate.   I have reviewed information obtained and documented by others and I have confirmed the accuracy of this documented note.    JOSEPH Abdul

## 2024-07-26 LAB
BACTERIA SPEC AEROBE CULT: NORMAL
GRAM STN SPEC: NORMAL

## 2024-08-16 ENCOUNTER — LAB (OUTPATIENT)
Dept: LAB | Facility: HOSPITAL | Age: 54
End: 2024-08-16
Payer: COMMERCIAL

## 2024-08-16 DIAGNOSIS — E03.9 HYPOTHYROIDISM, UNSPECIFIED TYPE: ICD-10-CM

## 2024-08-16 DIAGNOSIS — Z00.00 ANNUAL PHYSICAL EXAM: ICD-10-CM

## 2024-08-16 DIAGNOSIS — Z13.220 LIPID SCREENING: ICD-10-CM

## 2024-08-16 LAB
ALBUMIN SERPL-MCNC: 4.2 G/DL (ref 3.5–5.2)
ALBUMIN/GLOB SERPL: 1.4 G/DL
ALP SERPL-CCNC: 63 U/L (ref 39–117)
ALT SERPL W P-5'-P-CCNC: 14 U/L (ref 1–33)
ANION GAP SERPL CALCULATED.3IONS-SCNC: 8.1 MMOL/L (ref 5–15)
AST SERPL-CCNC: 40 U/L (ref 1–32)
BASOPHILS # BLD AUTO: 0.08 10*3/MM3 (ref 0–0.2)
BASOPHILS NFR BLD AUTO: 1.5 % (ref 0–1.5)
BILIRUB SERPL-MCNC: 0.2 MG/DL (ref 0–1.2)
BUN SERPL-MCNC: 12 MG/DL (ref 6–20)
BUN/CREAT SERPL: 15.8 (ref 7–25)
CALCIUM SPEC-SCNC: 9.8 MG/DL (ref 8.6–10.5)
CHLORIDE SERPL-SCNC: 105 MMOL/L (ref 98–107)
CHOLEST SERPL-MCNC: 184 MG/DL (ref 0–200)
CO2 SERPL-SCNC: 27.9 MMOL/L (ref 22–29)
CREAT SERPL-MCNC: 0.76 MG/DL (ref 0.57–1)
DEPRECATED RDW RBC AUTO: 42.1 FL (ref 37–54)
EGFRCR SERPLBLD CKD-EPI 2021: 92.1 ML/MIN/1.73
EOSINOPHIL # BLD AUTO: 0.15 10*3/MM3 (ref 0–0.4)
EOSINOPHIL NFR BLD AUTO: 2.8 % (ref 0.3–6.2)
ERYTHROCYTE [DISTWIDTH] IN BLOOD BY AUTOMATED COUNT: 12.2 % (ref 12.3–15.4)
GLOBULIN UR ELPH-MCNC: 3 GM/DL
GLUCOSE SERPL-MCNC: 95 MG/DL (ref 65–99)
HCT VFR BLD AUTO: 38.6 % (ref 34–46.6)
HDLC SERPL-MCNC: 56 MG/DL (ref 40–60)
HGB BLD-MCNC: 12.9 G/DL (ref 12–15.9)
IMM GRANULOCYTES # BLD AUTO: 0.01 10*3/MM3 (ref 0–0.05)
IMM GRANULOCYTES NFR BLD AUTO: 0.2 % (ref 0–0.5)
LDLC SERPL CALC-MCNC: 111 MG/DL (ref 0–100)
LDLC/HDLC SERPL: 1.94 {RATIO}
LYMPHOCYTES # BLD AUTO: 2.44 10*3/MM3 (ref 0.7–3.1)
LYMPHOCYTES NFR BLD AUTO: 46.1 % (ref 19.6–45.3)
MCH RBC QN AUTO: 31.8 PG (ref 26.6–33)
MCHC RBC AUTO-ENTMCNC: 33.4 G/DL (ref 31.5–35.7)
MCV RBC AUTO: 95.1 FL (ref 79–97)
MONOCYTES # BLD AUTO: 0.45 10*3/MM3 (ref 0.1–0.9)
MONOCYTES NFR BLD AUTO: 8.5 % (ref 5–12)
NEUTROPHILS NFR BLD AUTO: 2.16 10*3/MM3 (ref 1.7–7)
NEUTROPHILS NFR BLD AUTO: 40.9 % (ref 42.7–76)
NRBC BLD AUTO-RTO: 0 /100 WBC (ref 0–0.2)
PLATELET # BLD AUTO: 265 10*3/MM3 (ref 140–450)
PMV BLD AUTO: 11.4 FL (ref 6–12)
POTASSIUM SERPL-SCNC: 4.4 MMOL/L (ref 3.5–5.2)
PROT SERPL-MCNC: 7.2 G/DL (ref 6–8.5)
RBC # BLD AUTO: 4.06 10*6/MM3 (ref 3.77–5.28)
SODIUM SERPL-SCNC: 141 MMOL/L (ref 136–145)
T4 FREE SERPL-MCNC: 0.94 NG/DL (ref 0.92–1.68)
TRIGL SERPL-MCNC: 96 MG/DL (ref 0–150)
TSH SERPL DL<=0.05 MIU/L-ACNC: 3.41 UIU/ML (ref 0.27–4.2)
VLDLC SERPL-MCNC: 17 MG/DL (ref 5–40)
WBC NRBC COR # BLD AUTO: 5.29 10*3/MM3 (ref 3.4–10.8)

## 2024-08-16 PROCEDURE — 84439 ASSAY OF FREE THYROXINE: CPT

## 2024-08-16 PROCEDURE — 80061 LIPID PANEL: CPT

## 2024-08-16 PROCEDURE — 85025 COMPLETE CBC W/AUTO DIFF WBC: CPT

## 2024-08-16 PROCEDURE — 84443 ASSAY THYROID STIM HORMONE: CPT

## 2024-08-16 PROCEDURE — 80053 COMPREHEN METABOLIC PANEL: CPT

## 2024-08-16 PROCEDURE — 36415 COLL VENOUS BLD VENIPUNCTURE: CPT

## 2024-10-09 ENCOUNTER — OFFICE VISIT (OUTPATIENT)
Dept: GASTROENTEROLOGY | Facility: CLINIC | Age: 54
End: 2024-10-09
Payer: COMMERCIAL

## 2024-10-09 VITALS
BODY MASS INDEX: 23.07 KG/M2 | HEART RATE: 68 BPM | WEIGHT: 152.2 LBS | DIASTOLIC BLOOD PRESSURE: 58 MMHG | HEIGHT: 68 IN | SYSTOLIC BLOOD PRESSURE: 112 MMHG

## 2024-10-09 DIAGNOSIS — R74.8 ELEVATED LIVER ENZYMES: Primary | ICD-10-CM

## 2024-10-09 RX ORDER — MULTIVIT WITH MINERALS/LUTEIN
250 TABLET ORAL DAILY
COMMUNITY

## 2024-10-09 NOTE — PROGRESS NOTES
Chief Complaint     Elevated Hepatic Enzymes    History of Present Illness     Mrecy Dalton is a 54 y.o. female who presents to [facility] on referral from JOSEPH Cardoza for a gastroenterology evaluation of elevated liver enzymes.      Mild elevation of liver enzymes have been present for a few years.  Denies abdominal pain.  Her mother had cirrhosis with liver transplant and is now .  She is unsure of the etiology of her cirrhosis.    Denies ETOH abuse and hx of illicit drug use and unprofessional tattoos.         History      Past Medical History:   Diagnosis Date    Allergic May, 2010    Skin allergies (all noted in record)    Allergic rhinitis     Anemia     TAKES OVER THE COUNTER IRON SUPPLEMENT    Anesthesia     HAD BEEN MARKED  AS DIFFICULT INTUBATION PREVIOUSLY ONLY ISSUE PT REPORTS IS HAD PONV WITH PREVIOUS SURGERY    Exercise-induced asthma     REPORTS HAS RESOLVED AND NO ISSUES CURRENTLY    Herniated nucleus pulposus, L3-4 left     HGSIL (high grade squamous intraepithelial lesion) on Pap smear of cervix     Hypothyroid     Low back pain On/Off 20 years    Lumbar stenosis     Neurogenic claudication     Neuromuscular disorder approx     restless leg syndrome    Pneumonia Double pneumonia     DENIES ANY CURRENT ISSUES    PONV (postoperative nausea and vomiting)     RLS (restless legs syndrome) 2018    REPORTS THAT ON GABAPENTIN AND THAT HAS HELPED    Vertigo        Past Surgical History:   Procedure Laterality Date    COLONOSCOPY  2020    POLYPS, INTERNAL HEMORRHOIDS    DIAGNOSTIC LAPAROSCOPY      ECTOPIC PREGNANCY  ,    REMOVAL     ENDOMETRIAL ABLATION W/ NOVASURE      HERNIA REPAIR  2023    LASER ABLATION      ABNORMAL PERIODS/ DR. ALO LAMBERT N/A 2017    Procedure: LOOP ELECTROCAUTERY EXCISION PROCEDURE;  Surgeon: Oly Rivera MD;  Location: [facility];  Service:     LUMBAR DISCECTOMY Left  01/30/2023    Procedure: MINIMALLY INVASIVE LUMBAR DISCECTOMY, left approach, LUMBAR THREE-LUMBAR FOUR;  Surgeon: David Duran MD;  Location: [facility] OR;  Service: Neurosurgery;  Laterality: Left;    LUMBAR LAMINECTOMY Left 11/10/2021    Procedure: MINIMALLY INVASIVE LUMBAR LAMINECTOMY, LEFT APPROACH, LUMBAR 3-LUMBAR 4, LUMBAR 4-LUMBAR 5;  Surgeon: David Duran MD;  Location: [facility] MA[facility] OR;  Service: Neurosurgery;  Laterality: Left;    OTHER SURGICAL HISTORY Left     PARTIAL AMPUTATION OF LEFT INDEX FINGER    TUBAL ABDOMINAL LIGATION  1999?    Left only       Family History   Problem Relation Age of Onset    Liver disease Mother         Passed 3/2007    Arthritis Mother         Passed 4/2007    Thyroid disease Mother         Passed 3/2007    Diabetes Father         Passed 7/2021    Anxiety disorder Father         Passed 7/2021    Hyperlipidemia Father         Passed 7/2021    Skin cancer Maternal Grandmother         UKNOWN SKIN CANCER    Skin cancer Other         BASAL CELL     Stroke Other         Current Medications        Current Outpatient Medications:     baclofen (LIORESAL) 10 MG tablet, Take 1 tablet by mouth Every Night., Disp: 90 tablet, Rfl: 1    calcium carbonate (OS-KALPANA) 600 MG tablet, Take 1 tablet by mouth Daily., Disp: , Rfl:     desoximetasone (TOPICORT) 0.25 % cream, Apply to affected areas twice a day, for 2 weeks, take a one week break. Repeat if needed, Disp: 60 g, Rfl: 2    gabapentin (NEURONTIN) 100 MG capsule, Take 1 capsule by mouth As Needed (neuropathy.)., Disp: 90 capsule, Rfl: 1    gabapentin (NEURONTIN) 600 MG tablet, Take 1 tablet by mouth Every Night., Disp: 90 tablet, Rfl: 1    levothyroxine (SYNTHROID, LEVOTHROID) 25 MCG tablet, Take 1 tablet by mouth Daily., Disp: 90 tablet, Rfl: 1    mupirocin (BACTROBAN) 2 % cream, Apply 1 Application topically to the appropriate area as directed 3 (Three) Times a Day., Disp: , Rfl:     vitamin C (ASCORBIC ACID) 250 MG tablet, Take 1  "tablet by mouth Daily., Disp: , Rfl:      Allergies     Allergies   Allergen Reactions    Bacitracin Rash    Codeine Rash    Corticosteroids Rash    Formaldehyde Rash    Gold-Containing Drug Products Rash    Influenza Virus Vaccine Rash    Latex Rash    Neomycin Rash    Polymyxin B Rash    Polymyxin B-Trimethoprim Rash    Prednisone Rash    Septra [Sulfamethoxazole-Trimethoprim] Rash       Social History       Social History     Social History Narrative    Not on file       Immunizations     Immunization:  Immunization History   Administered Date(s) Administered    COVID-19 (MODERNA) 1st,2nd,3rd Dose Monovalent 12/28/2020, 01/22/2021, 11/09/2021    COVID-19 (MODERNA) Monovalent Original Booster 06/11/2022    Influenza, Unspecified 11/21/2019    Tdap 10/11/2021          Objective     Objective     Vital Signs:   /58 (BP Location: Left arm, Patient Position: Sitting, Cuff Size: Adult)   Pulse 68   Ht 172.7 cm (67.99\")   Wt 69 kg (152 lb 3.2 oz)   BMI 23.15 kg/m²       Physical Exam    Results      Result Review :   The following data was reviewed by: JOSEPH Painter on 10/09/2024:    CBC w/diff          8/16/2024    08:26   CBC w/Diff   WBC 5.29    RBC 4.06    Hemoglobin 12.9    Hematocrit 38.6    MCV 95.1    MCH 31.8    MCHC 33.4    RDW 12.2    Platelets 265    Neutrophil Rel % 40.9    Immature Granulocyte Rel % 0.2    Lymphocyte Rel % 46.1    Monocyte Rel % 8.5    Eosinophil Rel % 2.8    Basophil Rel % 1.5      CMP          5/2/2024    13:56 8/16/2024    08:26   CMP   Glucose  95    BUN  12    Creatinine  0.76    EGFR  92.1    Sodium  141    Potassium  4.4    Chloride  105    Calcium  9.8    Total Protein 7.5  7.2    Albumin 4.7  4.2    Globulin  3.0    Total Bilirubin 0.3  0.2    Alkaline Phosphatase 59  63    AST (SGOT) 43  40    ALT (SGPT) 17  14    Albumin/Globulin Ratio  1.4    BUN/Creatinine Ratio  15.8    Anion Gap  8.1        Iron Profile   Iron   Date Value Ref Range Status   11/20/2023 " 94 37 - 145 mcg/dL Final     TIBC   Date Value Ref Range Status   11/20/2023 375 298 - 536 mcg/dL Final     Iron Saturation (TSAT)   Date Value Ref Range Status   11/20/2023 25 20 - 50 % Final     Transferrin   Date Value Ref Range Status   11/20/2023 252 200 - 360 mg/dL Final     Ferritin   Ferritin   Date Value Ref Range Status   08/17/2022 127.00 13.00 - 150.00 ng/mL Final     Vitamin D   25 Hydroxy, Vitamin D   Date Value Ref Range Status   11/20/2023 46.7 30.0 - 100.0 ng/ml Final     9/14/2023 right upper quadrant ultrasound-normal hepatic size, contour and echogenicity.  No focal lesion.     Assessment and Plan        Assessment and Plan    Diagnoses and all orders for this visit:    1. Elevated liver enzymes (Primary)  -     Hepatitis Panel, Acute  -     AURELIANO  -     Alpha - 1 - Antitrypsin  -     Anti-Smooth Muscle Antibody Titer  -     Ceruloplasmin  -     Immunofixation, Serum; Future  -     Iron Profile; Future  -     Mitochondrial Antibodies, M2  -     CBC Auto Differential; Future  -     Comprehensive Metabolic Panel; Future          Follow Up        Follow Up   Return if symptoms worsen or fail to improve, for elevated liver enzymes.  Patient was given instructions and counseling regarding her condition or for health maintenance advice. Please see specific information pulled into the AVS if appropriate.

## 2024-10-10 ENCOUNTER — LAB (OUTPATIENT)
Dept: LAB | Facility: HOSPITAL | Age: 54
End: 2024-10-10
Payer: COMMERCIAL

## 2024-10-10 DIAGNOSIS — R74.8 ELEVATED LIVER ENZYMES: ICD-10-CM

## 2024-10-10 LAB
ALBUMIN SERPL-MCNC: 4.4 G/DL (ref 3.5–5.2)
ALBUMIN/GLOB SERPL: 1.4 G/DL
ALP SERPL-CCNC: 69 U/L (ref 39–117)
ALPHA1 GLOB MFR UR ELPH: 109 MG/DL (ref 90–200)
ALT SERPL W P-5'-P-CCNC: 14 U/L (ref 1–33)
ANION GAP SERPL CALCULATED.3IONS-SCNC: 11 MMOL/L (ref 5–15)
AST SERPL-CCNC: 41 U/L (ref 1–32)
BASOPHILS # BLD AUTO: 0.07 10*3/MM3 (ref 0–0.2)
BASOPHILS NFR BLD AUTO: 1.5 % (ref 0–1.5)
BILIRUB SERPL-MCNC: 0.4 MG/DL (ref 0–1.2)
BUN SERPL-MCNC: 12 MG/DL (ref 6–20)
BUN/CREAT SERPL: 15.6 (ref 7–25)
CALCIUM SPEC-SCNC: 10.1 MG/DL (ref 8.6–10.5)
CERULOPLASMIN SERPL-MCNC: 21 MG/DL (ref 19–39)
CHLORIDE SERPL-SCNC: 103 MMOL/L (ref 98–107)
CO2 SERPL-SCNC: 26 MMOL/L (ref 22–29)
CREAT SERPL-MCNC: 0.77 MG/DL (ref 0.57–1)
DEPRECATED RDW RBC AUTO: 41.2 FL (ref 37–54)
EGFRCR SERPLBLD CKD-EPI 2021: 90.7 ML/MIN/1.73
EOSINOPHIL # BLD AUTO: 0.1 10*3/MM3 (ref 0–0.4)
EOSINOPHIL NFR BLD AUTO: 2.2 % (ref 0.3–6.2)
ERYTHROCYTE [DISTWIDTH] IN BLOOD BY AUTOMATED COUNT: 12 % (ref 12.3–15.4)
GLOBULIN UR ELPH-MCNC: 3.2 GM/DL
GLUCOSE SERPL-MCNC: 91 MG/DL (ref 65–99)
HAV IGM SERPL QL IA: NORMAL
HBV CORE IGM SERPL QL IA: NORMAL
HBV SURFACE AG SERPL QL IA: NORMAL
HCT VFR BLD AUTO: 39.5 % (ref 34–46.6)
HCV AB SER QL: NORMAL
HGB BLD-MCNC: 13 G/DL (ref 12–15.9)
IMM GRANULOCYTES # BLD AUTO: 0 10*3/MM3 (ref 0–0.05)
IMM GRANULOCYTES NFR BLD AUTO: 0 % (ref 0–0.5)
IRON 24H UR-MRATE: 126 MCG/DL (ref 37–145)
IRON SATN MFR SERPL: 33 % (ref 20–50)
LYMPHOCYTES # BLD AUTO: 2.15 10*3/MM3 (ref 0.7–3.1)
LYMPHOCYTES NFR BLD AUTO: 47.3 % (ref 19.6–45.3)
MCH RBC QN AUTO: 31 PG (ref 26.6–33)
MCHC RBC AUTO-ENTMCNC: 32.9 G/DL (ref 31.5–35.7)
MCV RBC AUTO: 94.3 FL (ref 79–97)
MONOCYTES # BLD AUTO: 0.42 10*3/MM3 (ref 0.1–0.9)
MONOCYTES NFR BLD AUTO: 9.2 % (ref 5–12)
NEUTROPHILS NFR BLD AUTO: 1.81 10*3/MM3 (ref 1.7–7)
NEUTROPHILS NFR BLD AUTO: 39.8 % (ref 42.7–76)
NRBC BLD AUTO-RTO: 0 /100 WBC (ref 0–0.2)
PLATELET # BLD AUTO: 290 10*3/MM3 (ref 140–450)
PMV BLD AUTO: 11.5 FL (ref 6–12)
POTASSIUM SERPL-SCNC: 4 MMOL/L (ref 3.5–5.2)
PROT SERPL-MCNC: 7.6 G/DL (ref 6–8.5)
RBC # BLD AUTO: 4.19 10*6/MM3 (ref 3.77–5.28)
SODIUM SERPL-SCNC: 140 MMOL/L (ref 136–145)
TIBC SERPL-MCNC: 387 MCG/DL (ref 298–536)
TRANSFERRIN SERPL-MCNC: 260 MG/DL (ref 200–360)
WBC NRBC COR # BLD AUTO: 4.55 10*3/MM3 (ref 3.4–10.8)

## 2024-10-10 PROCEDURE — 82784 ASSAY IGA/IGD/IGG/IGM EACH: CPT

## 2024-10-10 PROCEDURE — 82103 ALPHA-1-ANTITRYPSIN TOTAL: CPT | Performed by: NURSE PRACTITIONER

## 2024-10-10 PROCEDURE — 86038 ANTINUCLEAR ANTIBODIES: CPT | Performed by: NURSE PRACTITIONER

## 2024-10-10 PROCEDURE — 86334 IMMUNOFIX E-PHORESIS SERUM: CPT

## 2024-10-10 PROCEDURE — 80074 ACUTE HEPATITIS PANEL: CPT | Performed by: NURSE PRACTITIONER

## 2024-10-10 PROCEDURE — 83540 ASSAY OF IRON: CPT

## 2024-10-10 PROCEDURE — 84466 ASSAY OF TRANSFERRIN: CPT

## 2024-10-10 PROCEDURE — 80053 COMPREHEN METABOLIC PANEL: CPT

## 2024-10-10 PROCEDURE — 85025 COMPLETE CBC W/AUTO DIFF WBC: CPT

## 2024-10-10 PROCEDURE — 86381 MITOCHONDRIAL ANTIBODY EACH: CPT | Performed by: NURSE PRACTITIONER

## 2024-10-10 PROCEDURE — 82390 ASSAY OF CERULOPLASMIN: CPT | Performed by: NURSE PRACTITIONER

## 2024-10-10 PROCEDURE — 36415 COLL VENOUS BLD VENIPUNCTURE: CPT

## 2024-10-10 PROCEDURE — 86015 ACTIN ANTIBODY EACH: CPT | Performed by: NURSE PRACTITIONER

## 2024-10-11 LAB
ANA SER QL: NEGATIVE
IGA SERPL-MCNC: 165 MG/DL (ref 87–352)
IGG SERPL-MCNC: 1551 MG/DL (ref 586–1602)
IGM SERPL-MCNC: 48 MG/DL (ref 26–217)
MITOCHONDRIA M2 IGG SER-ACNC: <20 UNITS (ref 0–20)
PROT PATTERN SERPL IFE-IMP: NORMAL
SMA IGG SER-ACNC: 6 UNITS (ref 0–19)

## 2024-10-14 ENCOUNTER — TELEPHONE (OUTPATIENT)
Dept: GASTROENTEROLOGY | Facility: CLINIC | Age: 54
End: 2024-10-14
Payer: COMMERCIAL

## 2024-10-14 NOTE — TELEPHONE ENCOUNTER
----- Message from Joanne Mcfarland sent at 10/13/2024  9:54 PM EDT -----  Liver w/u negative for autoimmune and viral liver disease.  Liver enzymes elevation stable.  Can be monitored per PCP.

## 2024-10-28 ENCOUNTER — PATIENT MESSAGE (OUTPATIENT)
Dept: FAMILY MEDICINE CLINIC | Facility: CLINIC | Age: 54
End: 2024-10-28

## 2024-10-28 ENCOUNTER — LAB (OUTPATIENT)
Dept: LAB | Facility: HOSPITAL | Age: 54
End: 2024-10-28
Payer: COMMERCIAL

## 2024-10-28 ENCOUNTER — OFFICE VISIT (OUTPATIENT)
Dept: FAMILY MEDICINE CLINIC | Facility: CLINIC | Age: 54
End: 2024-10-28
Payer: COMMERCIAL

## 2024-10-28 VITALS
SYSTOLIC BLOOD PRESSURE: 97 MMHG | HEIGHT: 68 IN | WEIGHT: 153.2 LBS | OXYGEN SATURATION: 99 % | BODY MASS INDEX: 23.22 KG/M2 | TEMPERATURE: 97.5 F | DIASTOLIC BLOOD PRESSURE: 44 MMHG | HEART RATE: 76 BPM

## 2024-10-28 DIAGNOSIS — G62.9 NEUROPATHY: ICD-10-CM

## 2024-10-28 DIAGNOSIS — K14.8 LESION OF TONGUE: Primary | ICD-10-CM

## 2024-10-28 DIAGNOSIS — K14.6 TONGUE BURNING SENSATION: ICD-10-CM

## 2024-10-28 DIAGNOSIS — G47.00 INSOMNIA, UNSPECIFIED TYPE: ICD-10-CM

## 2024-10-28 LAB
FOLATE SERPL-MCNC: 7.96 NG/ML (ref 4.78–24.2)
VIT B12 BLD-MCNC: 433 PG/ML (ref 211–946)

## 2024-10-28 PROCEDURE — 82746 ASSAY OF FOLIC ACID SERUM: CPT

## 2024-10-28 PROCEDURE — 99214 OFFICE O/P EST MOD 30 MIN: CPT | Performed by: NURSE PRACTITIONER

## 2024-10-28 PROCEDURE — 36415 COLL VENOUS BLD VENIPUNCTURE: CPT

## 2024-10-28 PROCEDURE — 82607 VITAMIN B-12: CPT

## 2024-10-28 RX ORDER — TRAZODONE HYDROCHLORIDE 50 MG/1
50 TABLET, FILM COATED ORAL NIGHTLY
Qty: 30 TABLET | Refills: 1 | Status: SHIPPED | OUTPATIENT
Start: 2024-10-28

## 2024-10-28 RX ORDER — GABAPENTIN 100 MG/1
100 CAPSULE ORAL AS NEEDED
Qty: 90 CAPSULE | Refills: 1 | Status: SHIPPED | OUTPATIENT
Start: 2024-10-28

## 2024-10-28 NOTE — ASSESSMENT & PLAN NOTE
After discussion we have decided to trial trazodone, side effects and administration of medication discussed, follow-up if no improvement

## 2024-10-28 NOTE — PROGRESS NOTES
Chief Complaint  Tongue Issue and Insomnia    SUBJECTIVE  Mercy Dalton presents to [facility]    Patient complaining of tiny bumps on right side of her tongue X a few months.  Patient reports a constant irritation, denies pain.  Symptoms seem to be worse at night.  Patient admits to some tenderness under right jaw when pushed.  Patient reports she has had tingling on her tongue X a few years, but it has never been bothersome.  Now she has developed these bumps on the back of her tongue that are very bothersome, states that it actually interferes with her sleep at times and she has to change the position of her tongue.    Patient complaining of difficulty falling asleep and staying asleep.  Patient reports she is also waking up earlier than normal.  Patient feels like she is not able to shut her brain off. Pt states tried an OTC sleep aid (benadryl) and melatonin with no improvement. Pt has always had some issues with falling asleep but now also having trouble staying asleep.     Patient also requesting refill of gabapentin.    History of Present Illness  Past Medical History:   Diagnosis Date    Allergic May, 2010    Skin allergies (all noted in record)    Allergic rhinitis     Anemia     TAKES OVER THE COUNTER IRON SUPPLEMENT    Anesthesia     HAD BEEN MARKED  AS DIFFICULT INTUBATION PREVIOUSLY ONLY ISSUE PT REPORTS IS HAD PONV WITH PREVIOUS SURGERY    Exercise-induced asthma     REPORTS HAS RESOLVED AND NO ISSUES CURRENTLY    Herniated nucleus pulposus, L3-4 left     HGSIL (high grade squamous intraepithelial lesion) on Pap smear of cervix     Hypothyroid     Low back pain On/Off 20 years    Lumbar stenosis     Neurogenic claudication     Neuromuscular disorder approx 2014    restless leg syndrome    Pneumonia Double pneumonia 2018    DENIES ANY CURRENT ISSUES    PONV (postoperative nausea and vomiting)     RLS (restless legs syndrome) 07/12/2018    REPORTS THAT ON GABAPENTIN  AND THAT HAS HELPED    Vertigo       Family History   Problem Relation Age of Onset    Liver disease Mother         Passed 3/2007    Arthritis Mother         Passed 4/2007    Thyroid disease Mother         Passed 3/2007    Diabetes Father         Passed 7/2021    Anxiety disorder Father         Passed 7/2021    Hyperlipidemia Father         Passed 7/2021    Skin cancer Maternal Grandmother         UKNOWN SKIN CANCER    Skin cancer Other         BASAL CELL     Stroke Other       Past Surgical History:   Procedure Laterality Date    COLONOSCOPY  02/21/2020    POLYPS, INTERNAL HEMORRHOIDS    DIAGNOSTIC LAPAROSCOPY      ECTOPIC PREGNANCY  1997,1999    REMOVAL     ENDOMETRIAL ABLATION W/ NOVASURE      HERNIA REPAIR  1/2023    LASER ABLATION  2012    ABNORMAL PERIODS/ DR. ALO LAMBERT N/A 04/03/2017    Procedure: LOOP ELECTROCAUTERY EXCISION PROCEDURE;  Surgeon: Oly Rivera MD;  Location: [facility];  Service:     LUMBAR DISCECTOMY Left 01/30/2023    Procedure: MINIMALLY INVASIVE LUMBAR DISCECTOMY, left approach, LUMBAR THREE-LUMBAR FOUR;  Surgeon: David Duran MD;  Location: [facility] OR;  Service: Neurosurgery;  Laterality: Left;    LUMBAR LAMINECTOMY Left 11/10/2021    Procedure: MINIMALLY INVASIVE LUMBAR LAMINECTOMY, LEFT APPROACH, LUMBAR 3-LUMBAR 4, LUMBAR 4-LUMBAR 5;  Surgeon: David Duran MD;  Location: [facility] OR;  Service: Neurosurgery;  Laterality: Left;    OTHER SURGICAL HISTORY Left     PARTIAL AMPUTATION OF LEFT INDEX FINGER    TUBAL ABDOMINAL LIGATION  1999?    Left only        Current Outpatient Medications:     baclofen (LIORESAL) 10 MG tablet, Take 1 tablet by mouth Every Night., Disp: 90 tablet, Rfl: 1    calcium carbonate (OS-KALPANA) 600 MG tablet, Take 1 tablet by mouth Daily., Disp: , Rfl:     desoximetasone (TOPICORT) 0.25 % cream, Apply to affected areas twice a day, for 2 weeks, take a one week break. Repeat if needed, Disp: 60 g, Rfl: 2    gabapentin (NEURONTIN) 100 MG  "capsule, Take 1 capsule by mouth As Needed (neuropathy.)., Disp: 90 capsule, Rfl: 1    gabapentin (NEURONTIN) 600 MG tablet, Take 1 tablet by mouth Every Night., Disp: 90 tablet, Rfl: 1    levothyroxine (SYNTHROID, LEVOTHROID) 25 MCG tablet, Take 1 tablet by mouth Daily., Disp: 90 tablet, Rfl: 1    mupirocin (BACTROBAN) 2 % cream, Apply 1 Application topically to the appropriate area as directed 3 (Three) Times a Day., Disp: , Rfl:     vitamin C (ASCORBIC ACID) 250 MG tablet, Take 1 tablet by mouth Daily., Disp: , Rfl:     traZODone (DESYREL) 50 MG tablet, Take 1 tablet by mouth Every Night., Disp: 30 tablet, Rfl: 1    OBJECTIVE  Vital Signs:   BP 97/44 (BP Location: Left arm, Patient Position: Sitting, Cuff Size: Large Adult)   Pulse 76   Temp 97.5 °F (36.4 °C) (Temporal)   Ht 172.7 cm (68\")   Wt 69.5 kg (153 lb 3.2 oz)   LMP  (LMP Unknown)   SpO2 99%   BMI 23.29 kg/m²    Estimated body mass index is 23.29 kg/m² as calculated from the following:    Height as of this encounter: 172.7 cm (68\").    Weight as of this encounter: 69.5 kg (153 lb 3.2 oz).     Wt Readings from Last 3 Encounters:   10/28/24 69.5 kg (153 lb 3.2 oz)   10/09/24 69 kg (152 lb 3.2 oz)   07/23/24 68.9 kg (152 lb)     BP Readings from Last 3 Encounters:   10/28/24 97/44   10/09/24 112/58   07/23/24 108/51       Physical Exam  Vitals reviewed.   Constitutional:       Appearance: Normal appearance. She is well-developed.   HENT:      Head: Normocephalic and atraumatic.      Right Ear: External ear normal.      Left Ear: External ear normal.      Mouth/Throat:        Comments: 2 small erythematous papules noted on right side of back of tongue, mildly tender to palpation  Eyes:      Conjunctiva/sclera: Conjunctivae normal.      Pupils: Pupils are equal, round, and reactive to light.   Cardiovascular:      Rate and Rhythm: Normal rate and regular rhythm.      Heart sounds: No murmur heard.     No friction rub. No gallop.   Pulmonary:      " Effort: Pulmonary effort is normal.      Breath sounds: Normal breath sounds. No wheezing or rhonchi.   Skin:     General: Skin is warm and dry.   Neurological:      Mental Status: She is alert and oriented to person, place, and time.      Cranial Nerves: No cranial nerve deficit.   Psychiatric:         Mood and Affect: Mood and affect normal.         Behavior: Behavior normal.         Thought Content: Thought content normal.         Judgment: Judgment normal.          Result Review    CMP          5/2/2024    13:56 8/16/2024    08:26 10/10/2024    09:46   CMP   Glucose  95  91    BUN  12  12    Creatinine  0.76  0.77    EGFR  92.1  90.7    Sodium  141  140    Potassium  4.4  4.0    Chloride  105  103    Calcium  9.8  10.1    Total Protein 7.5  7.2  7.6    Albumin 4.7  4.2  4.4    Globulin  3.0  3.2    Total Bilirubin 0.3  0.2  0.4    Alkaline Phosphatase 59  63  69    AST (SGOT) 43  40  41    ALT (SGPT) 17  14  14    Albumin/Globulin Ratio  1.4  1.4    BUN/Creatinine Ratio  15.8  15.6    Anion Gap  8.1  11.0      CBC          8/16/2024    08:26 10/10/2024    09:46   CBC   WBC 5.29  4.55    RBC 4.06  4.19    Hemoglobin 12.9  13.0    Hematocrit 38.6  39.5    MCV 95.1  94.3    MCH 31.8  31.0    MCHC 33.4  32.9    RDW 12.2  12.0    Platelets 265  290      Lipid Panel          8/16/2024    08:26   Lipid Panel   Total Cholesterol 184    Triglycerides 96    HDL Cholesterol 56    VLDL Cholesterol 17    LDL Cholesterol  111    LDL/HDL Ratio 1.94      TSH          8/16/2024    08:26   TSH   TSH 3.410            Lab Results   Component Value Date    NONG45PH 46.7 11/20/2023        Lab Results   Component Value Date    FREET4 0.94 08/16/2024     Lab Results   Component Value Date    PVENCKJX07 613 09/11/2023       No Images in the past 120 days found..      The above data has been reviewed by JOSEPH Abdul 10/28/2024 07:21 EDT.          Patient Care Team:  Courtney Saul APRN as PCP - General (Nurse  Practitioner)  Oly Rivera MD as Consulting Physician (Obstetrics and Gynecology)  Joanne Mcfarland APRN as Nurse Practitioner (Nurse Practitioner)    BMI is within normal parameters. No other follow-up for BMI required.       ASSESSMENT & PLAN    Diagnoses and all orders for this visit:    1. Lesion of tongue (Primary)  -     Ambulatory Referral to Oral Maxillofacial Surgery    2. Neuropathy  Assessment & Plan:  Goal, symptoms controlled, continue current medication    Orders:  -     gabapentin (NEURONTIN) 100 MG capsule; Take 1 capsule by mouth As Needed (neuropathy.).  Dispense: 90 capsule; Refill: 1    3. Tongue burning sensation  -     Folate; Future  -     Vitamin B12; Future    4. Insomnia, unspecified type  Assessment & Plan:  After discussion we have decided to trial trazodone, side effects and administration of medication discussed, follow-up if no improvement    Orders:  -     traZODone (DESYREL) 50 MG tablet; Take 1 tablet by mouth Every Night.  Dispense: 30 tablet; Refill: 1         Tobacco Use: Low Risk  (10/28/2024)    Patient History     Smoking Tobacco Use: Never     Smokeless Tobacco Use: Never     Passive Exposure: Not on file       Follow Up     Return if symptoms worsen or fail to improve.      Patient was given instructions and counseling regarding her condition or for health maintenance advice. Please see specific information pulled into the AVS if appropriate.   I have reviewed information obtained and documented by others and I have confirmed the accuracy of this documented note.    JOSEPH Abdul

## 2024-10-29 ENCOUNTER — LAB (OUTPATIENT)
Dept: LAB | Facility: HOSPITAL | Age: 54
End: 2024-10-29
Payer: COMMERCIAL

## 2024-10-29 DIAGNOSIS — M25.542 ARTHRALGIA OF BOTH HANDS: ICD-10-CM

## 2024-10-29 DIAGNOSIS — M25.541 ARTHRALGIA OF BOTH HANDS: Primary | ICD-10-CM

## 2024-10-29 DIAGNOSIS — M25.541 ARTHRALGIA OF BOTH HANDS: ICD-10-CM

## 2024-10-29 DIAGNOSIS — M25.542 ARTHRALGIA OF BOTH HANDS: Primary | ICD-10-CM

## 2024-10-29 LAB
CRP SERPL-MCNC: <0.3 MG/DL (ref 0–0.5)
ERYTHROCYTE [SEDIMENTATION RATE] IN BLOOD: 6 MM/HR (ref 0–30)

## 2024-10-29 PROCEDURE — 36415 COLL VENOUS BLD VENIPUNCTURE: CPT

## 2024-10-29 PROCEDURE — 86140 C-REACTIVE PROTEIN: CPT

## 2024-10-29 PROCEDURE — 86200 CCP ANTIBODY: CPT

## 2024-10-29 PROCEDURE — 85652 RBC SED RATE AUTOMATED: CPT

## 2024-10-31 LAB — CCP IGA+IGG SERPL IA-ACNC: 8 UNITS (ref 0–19)

## 2024-12-27 DIAGNOSIS — R25.2 MUSCLE CRAMPS: ICD-10-CM

## 2024-12-30 DIAGNOSIS — R25.2 MUSCLE CRAMPS: ICD-10-CM

## 2024-12-30 RX ORDER — BACLOFEN 10 MG/1
10 TABLET ORAL NIGHTLY
Qty: 90 TABLET | Refills: 1 | Status: CANCELLED | OUTPATIENT
Start: 2024-12-30

## 2024-12-30 RX ORDER — BACLOFEN 10 MG/1
10 TABLET ORAL NIGHTLY
Qty: 90 TABLET | Refills: 1 | Status: SHIPPED | OUTPATIENT
Start: 2024-12-30

## 2025-01-23 DIAGNOSIS — G62.9 NEUROPATHY: ICD-10-CM

## 2025-01-23 DIAGNOSIS — G25.81 RLS (RESTLESS LEGS SYNDROME): ICD-10-CM

## 2025-01-23 RX ORDER — GABAPENTIN 600 MG/1
600 TABLET ORAL NIGHTLY
Qty: 90 TABLET | Refills: 1 | Status: SHIPPED | OUTPATIENT
Start: 2025-01-23

## 2025-02-10 NOTE — TELEPHONE ENCOUNTER
----- Message from JOSEPH Chiu sent at 3/30/2022  7:51 AM EDT -----  Her FSH and LH are considered in the postmenopausal level but she has a couple other values that are not completely in the postmenopausal levels   You can access the FollowMyHealth Patient Portal offered by [facility] by registering at the following website: [email or web address]. By joining [facility]’s FollowMyHealth portal, you will also be able to view your health information using other applications (apps) compatible with our system.

## 2025-03-20 ENCOUNTER — TRANSCRIBE ORDERS (OUTPATIENT)
Dept: ADMINISTRATIVE | Facility: HOSPITAL | Age: 55
End: 2025-03-20
Payer: COMMERCIAL

## 2025-03-20 DIAGNOSIS — Z12.31 VISIT FOR SCREENING MAMMOGRAM: Primary | ICD-10-CM

## 2025-04-21 DIAGNOSIS — E03.9 HYPOTHYROIDISM, UNSPECIFIED TYPE: ICD-10-CM

## 2025-04-22 RX ORDER — LEVOTHYROXINE SODIUM 25 UG/1
25 TABLET ORAL DAILY
Qty: 90 TABLET | Refills: 0 | Status: SHIPPED | OUTPATIENT
Start: 2025-04-22 | End: 2025-04-24 | Stop reason: SDUPTHER

## 2025-04-24 ENCOUNTER — OFFICE VISIT (OUTPATIENT)
Dept: FAMILY MEDICINE CLINIC | Facility: CLINIC | Age: 55
End: 2025-04-24
Payer: COMMERCIAL

## 2025-04-24 VITALS
HEIGHT: 68 IN | BODY MASS INDEX: 22.73 KG/M2 | SYSTOLIC BLOOD PRESSURE: 119 MMHG | DIASTOLIC BLOOD PRESSURE: 57 MMHG | WEIGHT: 150 LBS | OXYGEN SATURATION: 100 % | HEART RATE: 67 BPM

## 2025-04-24 DIAGNOSIS — G47.00 INSOMNIA, UNSPECIFIED TYPE: ICD-10-CM

## 2025-04-24 DIAGNOSIS — E03.9 HYPOTHYROIDISM, UNSPECIFIED TYPE: ICD-10-CM

## 2025-04-24 DIAGNOSIS — Z86.0100 PERSONAL HISTORY OF COLON POLYPS, UNSPECIFIED: Primary | ICD-10-CM

## 2025-04-24 DIAGNOSIS — G62.9 NEUROPATHY: ICD-10-CM

## 2025-04-24 RX ORDER — LEVOTHYROXINE SODIUM 25 UG/1
25 TABLET ORAL DAILY
Qty: 90 TABLET | Refills: 1 | Status: SHIPPED | OUTPATIENT
Start: 2025-04-24

## 2025-04-24 RX ORDER — TRAZODONE HYDROCHLORIDE 50 MG/1
50 TABLET ORAL NIGHTLY
Qty: 90 TABLET | Refills: 1 | Status: SHIPPED | OUTPATIENT
Start: 2025-04-24

## 2025-04-24 RX ORDER — GABAPENTIN 100 MG/1
100 CAPSULE ORAL AS NEEDED
Qty: 90 CAPSULE | Refills: 1 | Status: SHIPPED | OUTPATIENT
Start: 2025-04-24

## 2025-04-24 NOTE — PROGRESS NOTES
Chief Complaint  Hypothyroidism, Insomnia, and Restless Legs Syndrome    SUBJECTIVE  Mercy Dalton presents to [facility] for six month follow up on Hypothyroidism, Insomnia, and Restless Legs Syndrome.     Mammogram scheduled for 05/13/25.     History of Present Illness  Past Medical History:   Diagnosis Date    Allergic May, 2010    Skin allergies (all noted in record)    Allergic rhinitis     Anemia     TAKES OVER THE COUNTER IRON SUPPLEMENT    Anesthesia     HAD BEEN MARKED  AS DIFFICULT INTUBATION PREVIOUSLY ONLY ISSUE PT REPORTS IS HAD PONV WITH PREVIOUS SURGERY    Exercise-induced asthma     REPORTS HAS RESOLVED AND NO ISSUES CURRENTLY    Herniated nucleus pulposus, L3-4 left     HGSIL (high grade squamous intraepithelial lesion) on Pap smear of cervix     Hypothyroid     Low back pain On/Off 20 years    Lumbar stenosis     Neurogenic claudication     Neuromuscular disorder approx 2014    restless leg syndrome    Pneumonia Double pneumonia 2018    DENIES ANY CURRENT ISSUES    PONV (postoperative nausea and vomiting)     RLS (restless legs syndrome) 07/12/2018    REPORTS THAT ON GABAPENTIN AND THAT HAS HELPED    Vertigo       Family History   Problem Relation Age of Onset    Liver disease Mother         Passed 3/2007    Arthritis Mother         Passed 4/2007    Thyroid disease Mother         Passed 3/2007    Diabetes Father         Passed 7/2021    Anxiety disorder Father         Passed 7/2021    Hyperlipidemia Father         Passed 7/2021    Skin cancer Maternal Grandmother         UKNOWN SKIN CANCER    Skin cancer Other         BASAL CELL     Stroke Other       Past Surgical History:   Procedure Laterality Date    COLONOSCOPY  02/21/2020    POLYPS, INTERNAL HEMORRHOIDS    DIAGNOSTIC LAPAROSCOPY      ECTOPIC PREGNANCY  1997,1999    REMOVAL     ENDOMETRIAL ABLATION W/ NOVASURE      HERNIA REPAIR  1/2023    LASER ABLATION  2012    ABNORMAL PERIODS/ DR. ALO LAMBERT N/A  "04/03/2017    Procedure: LOOP ELECTROCAUTERY EXCISION PROCEDURE;  Surgeon: Oly Rivera MD;  [place] [facility] [place] [facility] [place]  Service:     LUMBAR DISCECTOMY Left 01/30/2023    Procedure: MINIMALLY INVASIVE LUMBAR DISCECTOMY, left approach, LUMBAR THREE-LUMBAR FOUR;  Surgeon: David Duran MD;  Location: [facility] OR;  Service: Neurosurgery;  Laterality: Left;    LUMBAR LAMINECTOMY Left 11/10/2021    Procedure: MINIMALLY INVASIVE LUMBAR LAMINECTOMY, LEFT APPROACH, LUMBAR 3-LUMBAR 4, LUMBAR 4-LUMBAR 5;  Surgeon: David Duran MD;  [facility];  Service: Neurosurgery;  Laterality: Left;    OTHER SURGICAL HISTORY Left     PARTIAL AMPUTATION OF LEFT INDEX FINGER    TUBAL ABDOMINAL LIGATION  1999?    Left only        Current Outpatient Medications:     baclofen (LIORESAL) 10 MG tablet, TAKE 1 TABLET EVERY NIGHT, Disp: 90 tablet, Rfl: 1    calcium carbonate (OS-KALPANA) 600 MG tablet, Take 1 tablet by mouth Daily., Disp: , Rfl:     desoximetasone (TOPICORT) 0.25 % cream, Apply to affected areas twice a day, for 2 weeks, take a one week break. Repeat if needed, Disp: 60 g, Rfl: 2    gabapentin (NEURONTIN) 100 MG capsule, Take 1 capsule by mouth As Needed (neuropathy.)., Disp: 90 capsule, Rfl: 1    gabapentin (NEURONTIN) 600 MG tablet, TAKE 1 TABLET EVERY NIGHT, Disp: 90 tablet, Rfl: 1    levothyroxine (SYNTHROID, LEVOTHROID) 25 MCG tablet, Take 1 tablet by mouth Daily., Disp: 90 tablet, Rfl: 1    mupirocin (BACTROBAN) 2 % cream, Apply 1 Application topically to the appropriate area as directed 3 (Three) Times a Day., Disp: , Rfl:     traZODone (DESYREL) 50 MG tablet, Take 1 tablet by mouth Every Night., Disp: 90 tablet, Rfl: 1    vitamin C (ASCORBIC ACID) 250 MG tablet, Take 1 tablet by mouth Daily., Disp: , Rfl:     OBJECTIVE  Vital Signs:   /57   Pulse 67   Ht 172.7 cm (68\")   Wt 68 kg (150 lb)   LMP  (LMP Unknown)   SpO2 100%   BMI 22.81 kg/m²    Estimated body mass index is 22.81 kg/m² as " "calculated from the following:    Height as of this encounter: 172.7 cm (68\").    Weight as of this encounter: 68 kg (150 lb).     Wt Readings from Last 3 Encounters:   04/24/25 68 kg (150 lb)   10/28/24 69.5 kg (153 lb 3.2 oz)   10/09/24 69 kg (152 lb 3.2 oz)     BP Readings from Last 3 Encounters:   04/24/25 119/57   10/28/24 97/44   10/09/24 112/58       Physical Exam  Vitals reviewed.   Constitutional:       Appearance: Normal appearance. She is well-developed.   HENT:      Head: Normocephalic and atraumatic.      Right Ear: External ear normal.      Left Ear: External ear normal.   Eyes:      Conjunctiva/sclera: Conjunctivae normal.      Pupils: Pupils are equal, round, and reactive to light.   Cardiovascular:      Rate and Rhythm: Normal rate and regular rhythm.      Heart sounds: No murmur heard.     No friction rub. No gallop.   Pulmonary:      Effort: Pulmonary effort is normal.      Breath sounds: Normal breath sounds. No wheezing or rhonchi.   Skin:     General: Skin is warm and dry.   Neurological:      Mental Status: She is alert and oriented to person, place, and time.      Cranial Nerves: No cranial nerve deficit.   Psychiatric:         Mood and Affect: Mood and affect normal.         Behavior: Behavior normal.         Thought Content: Thought content normal.         Judgment: Judgment normal.          Result Review    CMP          5/2/2024    13:56 8/16/2024    08:26 10/10/2024    09:46   CMP   Glucose  95  91    BUN  12  12    Creatinine  0.76  0.77    EGFR  92.1  90.7    Sodium  141  140    Potassium  4.4  4.0    Chloride  105  103    Calcium  9.8  10.1    Total Protein 7.5  7.2  7.6    Albumin 4.7  4.2  4.4    Globulin  3.0  3.2    Total Bilirubin 0.3  0.2  0.4    Alkaline Phosphatase 59  63  69    AST (SGOT) 43  40  41    ALT (SGPT) 17  14  14    Albumin/Globulin Ratio  1.4  1.4    BUN/Creatinine Ratio  15.8  15.6    Anion Gap  8.1  11.0      CBC          8/16/2024    08:26 10/10/2024    09:46 "   CBC   WBC 5.29  4.55    RBC 4.06  4.19    Hemoglobin 12.9  13.0    Hematocrit 38.6  39.5    MCV 95.1  94.3    MCH 31.8  31.0    MCHC 33.4  32.9    RDW 12.2  12.0    Platelets 265  290      Lipid Panel          8/16/2024    08:26   Lipid Panel   Total Cholesterol 184    Triglycerides 96    HDL Cholesterol 56    VLDL Cholesterol 17    LDL Cholesterol  111    LDL/HDL Ratio 1.94      TSH          8/16/2024    08:26   TSH   TSH 3.410          No Images in the past 120 days found..     The above data has been reviewed by JOSEPH Abdul 04/24/2025 09:58 EDT.          Patient Care Team:  Courtney Saul APRN as PCP - General (Nurse Practitioner)  Oly Rivera MD as Consulting Physician (Obstetrics and Gynecology)  Joanne Mcfarland APRN as Nurse Practitioner (Nurse Practitioner)    BMI is within normal parameters. No other follow-up for BMI required.       ASSESSMENT & PLAN    Diagnoses and all orders for this visit:    1. Personal history of colon polyps, unspecified (Primary)  Comments:  2 polyps removed during colonoscopy in 2020, 1 hyperplastic, 1 tubular adenoma  Orders:  -     Ambulatory Referral For Screening Colonoscopy    2. Neuropathy  Assessment & Plan:  Able on gabapentin, continue current medication    Orders:  -     gabapentin (NEURONTIN) 100 MG capsule; Take 1 capsule by mouth As Needed (neuropathy.).  Dispense: 90 capsule; Refill: 1  -     Pain Management Profile (13 Drugs) Urine - Urine, Clean Catch; Future    3. Hypothyroidism, unspecified type  Comments:  No changes at this time.  She will have labs drawn.  Orders:  -     levothyroxine (SYNTHROID, LEVOTHROID) 25 MCG tablet; Take 1 tablet by mouth Daily.  Dispense: 90 tablet; Refill: 1  -     TSH+Free T4; Future    4. Insomnia, unspecified type  Assessment & Plan:  Stable with resident, continue current medication    Orders:  -     traZODone (DESYREL) 50 MG tablet; Take 1 tablet by mouth Every Night.  Dispense: 90 tablet; Refill:  1         Tobacco Use: Low Risk  (4/24/2025)    Patient History     Smoking Tobacco Use: Never     Smokeless Tobacco Use: Never     Passive Exposure: Not on file       Follow Up     Return in about 6 months (around 10/24/2025), or if symptoms worsen or fail to improve.        Patient was given instructions and counseling regarding her condition or for health maintenance advice. Please see specific information pulled into the AVS if appropriate.   I have reviewed information obtained and documented by others and I have confirmed the accuracy of this documented note.    Courtney Saul, APRN

## 2025-05-13 ENCOUNTER — HOSPITAL ENCOUNTER (OUTPATIENT)
Dept: MAMMOGRAPHY | Facility: HOSPITAL | Age: 55
Discharge: HOME | End: 2025-05-13
Admitting: NURSE PRACTITIONER
Payer: COMMERCIAL

## 2025-05-13 DIAGNOSIS — Z12.31 VISIT FOR SCREENING MAMMOGRAM: ICD-10-CM

## 2025-05-13 PROCEDURE — 77067 SCR MAMMO BI INCL CAD: CPT

## 2025-05-13 PROCEDURE — 77063 BREAST TOMOSYNTHESIS BI: CPT

## 2025-07-06 DIAGNOSIS — R25.2 MUSCLE CRAMPS: ICD-10-CM

## 2025-07-07 RX ORDER — BACLOFEN 10 MG/1
10 TABLET ORAL NIGHTLY
Qty: 90 TABLET | Refills: 0 | Status: SHIPPED | OUTPATIENT
Start: 2025-07-07

## 2025-07-22 ENCOUNTER — OFFICE VISIT (OUTPATIENT)
Dept: FAMILY MEDICINE CLINIC | Facility: CLINIC | Age: 55
End: 2025-07-22
Payer: COMMERCIAL

## 2025-07-22 VITALS
OXYGEN SATURATION: 97 % | BODY MASS INDEX: 22.73 KG/M2 | SYSTOLIC BLOOD PRESSURE: 104 MMHG | WEIGHT: 150 LBS | DIASTOLIC BLOOD PRESSURE: 55 MMHG | HEART RATE: 64 BPM | HEIGHT: 68 IN

## 2025-07-22 DIAGNOSIS — R21 RASH: Primary | ICD-10-CM

## 2025-07-22 DIAGNOSIS — L29.9 ITCHING: ICD-10-CM

## 2025-07-22 PROCEDURE — 99214 OFFICE O/P EST MOD 30 MIN: CPT | Performed by: STUDENT IN AN ORGANIZED HEALTH CARE EDUCATION/TRAINING PROGRAM

## 2025-07-22 RX ORDER — DIPHENHYDRAMINE HYDROCHLORIDE, ZINC ACETATE 2; .1 G/100G; G/100G
1 CREAM TOPICAL 3 TIMES DAILY PRN
Qty: 15 G | Refills: 0 | Status: SHIPPED | OUTPATIENT
Start: 2025-07-22

## 2025-07-22 RX ORDER — HYDROXYZINE HYDROCHLORIDE 25 MG/1
25 TABLET, FILM COATED ORAL 3 TIMES DAILY PRN
Qty: 24 TABLET | Refills: 0 | Status: SHIPPED | OUTPATIENT
Start: 2025-07-22

## 2025-07-22 NOTE — PROGRESS NOTES
Subjective:       Mercy Dalton is a 55 y.o. female who presents for rash.     Ms. Dalton is normally seen by PCP Courtney Crane. We are seeing her for an acute sick visit. We are grateful to Courtney for allowing us to see this patient.     Ms. Dalton reports pruitic rash after using new supplement.     Symptoms started Saturday.    On physical exam, these look like insect bites- but she does tell me this is unlikely as she takes great precautions against this due to allergies.      Would recommend discontinuing possible offending agents.     Steroids, which help suppress the allergic/immune response, form the cornerstone of treatment for many rashes. However, this patient's allergy list lists corticosteroids and prednisone as a medication allergy. I would be hesitant to use a medication that the patient has reported an allergy to in this setting, and this does make treatment more challenging.     She is mostly concerned about medicine for itching. Would prescribe PRN atarax and topical benadryl cream for pruitis. If symptoms change or worsen by end of the week, have asked her to notify me in the chart and would consider derm referral.       Past Medical Hx:  Past Medical History:   Diagnosis Date    Allergic May, 2010    Skin allergies (all noted in record)    Allergic rhinitis     Anemia     TAKES OVER THE COUNTER IRON SUPPLEMENT    Anesthesia     HAD BEEN MARKED  AS DIFFICULT INTUBATION PREVIOUSLY ONLY ISSUE PT REPORTS IS HAD PONV WITH PREVIOUS SURGERY    Exercise-induced asthma     REPORTS HAS RESOLVED AND NO ISSUES CURRENTLY    Herniated nucleus pulposus, L3-4 left     HGSIL (high grade squamous intraepithelial lesion) on Pap smear of cervix     Hypothyroid     Low back pain On/Off 20 years    Lumbar stenosis     Neurogenic claudication     Neuromuscular disorder approx 2014    restless leg syndrome    Pneumonia Double pneumonia 2018    DENIES ANY CURRENT ISSUES    PONV (postoperative nausea and  vomiting)     RLS (restless legs syndrome) 07/12/2018    REPORTS THAT ON GABAPENTIN AND THAT HAS HELPED    Vertigo        Past Surgical Hx:  Past Surgical History:   Procedure Laterality Date    COLONOSCOPY  02/21/2020    POLYPS, INTERNAL HEMORRHOIDS    DIAGNOSTIC LAPAROSCOPY      ECTOPIC PREGNANCY  1997,1999    REMOVAL     ENDOMETRIAL ABLATION W/ NOVASURE      HERNIA REPAIR  1/2023    LASER ABLATION  2012    ABNORMAL PERIODS/ DR. ALO LAMBERT N/A 04/03/2017    Procedure: LOOP ELECTROCAUTERY EXCISION PROCEDURE;  Surgeon: Oly Rivera MD;  Location:  [facility];  Service:     LUMBAR DISCECTOMY Left 01/30/2023    Procedure: MINIMALLY INVASIVE LUMBAR DISCECTOMY, left approach, LUMBAR THREE-LUMBAR FOUR;  Surgeon: David Duran MD;  Location: [facility] MAIN OR;  Service: Neurosurgery;  Laterality: Left;    LUMBAR LAMINECTOMY Left 11/10/2021    Procedure: MINIMALLY INVASIVE LUMBAR LAMINECTOMY, LEFT APPROACH, LUMBAR 3-LUMBAR 4, LUMBAR 4-LUMBAR 5;  Surgeon: David Duran MD;  Location: [facility] MAIN OR;  Service: Neurosurgery;  Laterality: Left;    OTHER SURGICAL HISTORY Left     PARTIAL AMPUTATION OF LEFT INDEX FINGER    TUBAL ABDOMINAL LIGATION  1999?    Left only       Current Meds:    Current Outpatient Medications:     baclofen (LIORESAL) 10 MG tablet, TAKE 1 TABLET EVERY NIGHT, Disp: 90 tablet, Rfl: 0    calcium carbonate (OS-KALPANA) 600 MG tablet, Take 1 tablet by mouth Daily., Disp: , Rfl:     desoximetasone (TOPICORT) 0.25 % cream, Apply to affected areas twice a day, for 2 weeks, take a one week break. Repeat if needed, Disp: 60 g, Rfl: 2    gabapentin (NEURONTIN) 100 MG capsule, Take 1 capsule by mouth As Needed (neuropathy.)., Disp: 90 capsule, Rfl: 1    gabapentin (NEURONTIN) 600 MG tablet, TAKE 1 TABLET EVERY NIGHT, Disp: 90 tablet, Rfl: 1    levothyroxine (SYNTHROID, LEVOTHROID) 25 MCG tablet, Take 1 tablet by mouth Daily., Disp: 90 tablet, Rfl: 1    mupirocin (BACTROBAN) 2 % cream, Apply 1 Application  topically to the appropriate area as directed 3 (Three) Times a Day., Disp: , Rfl:     traZODone (DESYREL) 50 MG tablet, Take 1 tablet by mouth Every Night. (Patient taking differently: Take 1 tablet by mouth Daily As Needed.), Disp: 90 tablet, Rfl: 1    vitamin C (ASCORBIC ACID) 250 MG tablet, Take 1 tablet by mouth Daily., Disp: , Rfl:     diphenhydrAMINE-zinc acetate (Benadryl Extra Strength) 2-0.1 % cream, Apply 1 Application topically to the appropriate area as directed 3 (Three) Times a Day As Needed for Itching or Rash., Disp: 15 g, Rfl: 0    hydrOXYzine (ATARAX) 25 MG tablet, Take 1 tablet by mouth 3 (Three) Times a Day As Needed for Itching or Allergies., Disp: 24 tablet, Rfl: 0    Allergies:  Allergies   Allergen Reactions    Bacitracin Rash    Codeine Rash    Corticosteroids Rash    Formaldehyde Rash    Gold-Containing Drug Products Rash    Influenza Virus Vaccine Rash    Latex Rash    Neomycin Rash    Polymyxin B Rash    Polymyxin B-Trimethoprim Rash    Prednisone Rash    Septra [Sulfamethoxazole-Trimethoprim] Rash       Family Hx:  Family History   Problem Relation Age of Onset    Liver disease Mother         Passed 3/2007    Arthritis Mother         Passed 4/2007    Thyroid disease Mother         Passed 3/2007    Diabetes Father         Passed 7/2021    Anxiety disorder Father         Passed 7/2021    Hyperlipidemia Father         Passed 7/2021    Skin cancer Maternal Grandmother         UKNOWN SKIN CANCER    Skin cancer Other         BASAL CELL     Stroke Other         Social History:  Social History     Socioeconomic History    Marital status:    Tobacco Use    Smoking status: Never    Smokeless tobacco: Never   Vaping Use    Vaping status: Never Used   Substance and Sexual Activity    Alcohol use: Yes     Alcohol/week: 4.0 standard drinks of alcohol     Types: 1 Glasses of wine, 2 Cans of beer, 1 Drinks containing 0.5 oz of alcohol per week     Comment: SOCIALLY    Drug use: Never    Sexual  "activity: Yes     Partners: Male     Birth control/protection: Post-menopausal     Comment: Current spouse       Review of Systems  Review of Systems   Skin:  Positive for rash.       Objective:     /55 (BP Location: Left arm, Patient Position: Sitting, Cuff Size: Adult)   Pulse 64   Ht 172.7 cm (68\")   Wt 68 kg (150 lb)   LMP  (LMP Unknown)   SpO2 97%   BMI 22.81 kg/m²   Physical Exam  Constitutional:       General: She is not in acute distress.     Appearance: Normal appearance. She is not ill-appearing, toxic-appearing or diaphoretic.   Pulmonary:      Effort: Pulmonary effort is normal. No respiratory distress.   Skin:     Findings: Rash present.   Neurological:      Mental Status: She is alert.          Assessment/Plan:     Diagnoses and all orders for this visit:    1. Rash (Primary)  2. Itching  Ms. Dalton reports pruitic rash after using new supplement.     Symptoms started Saturday.    On physical exam, these look like insect bites- but she does tell me this is unlikely as she takes great precautions against this due to allergies.      Would recommend discontinuing possible offending agents.     Steroids, which help suppress the allergic/immune response, form the cornerstone of treatment for many rashes. However, this patient's allergy list lists corticosteroids and prednisone as a medication allergy. I would be hesitant to use a medication that the patient has reported an allergy to in this setting, and this does make treatment more challenging.     She is mostly concerned about medicine for itching. Would prescribe PRN atarax and topical benadryl cream for pruitis. If symptoms change or worsen by end of the week, have asked her to notify me in the chart and would consider derm referral.       -     hydrOXYzine (ATARAX) 25 MG tablet; Take 1 tablet by mouth 3 (Three) Times a Day As Needed for Itching or Allergies.  Dispense: 24 tablet; Refill: 0  -     diphenhydrAMINE-zinc acetate (Benadryl " Extra Strength) 2-0.1 % cream; Apply 1 Application topically to the appropriate area as directed 3 (Three) Times a Day As Needed for Itching or Rash.  Dispense: 15 g; Refill: 0                Follow-up:     Return if symptoms worsen or fail to improve, for Next scheduled follow up.    Preventative:  Health Maintenance   Topic Date Due    Pneumococcal Vaccine 50+ (1 of 1 - PCV) Never done    ZOSTER VACCINE (1 of 2) Never done    COVID-19 Vaccine (5 - 2024-25 season) 09/01/2024    COLORECTAL CANCER SCREENING  02/01/2025    ANNUAL PHYSICAL  07/23/2025    PAP SMEAR  11/01/2026    MAMMOGRAM  05/13/2027    TDAP/TD VACCINES (2 - Td or Tdap) 10/11/2031    HEPATITIS C SCREENING  Completed         This document has been electronically signed by Ryan Fulton MD on July 22, 2025 15:13 EDT       Parts of this note are electronic transcriptions/translations of spoken language to printed text using the Dragon Dictation system.

## 2025-07-25 DIAGNOSIS — L98.9 SKIN LESION: Primary | ICD-10-CM

## 2025-07-29 ENCOUNTER — TELEPHONE (OUTPATIENT)
Dept: FAMILY MEDICINE CLINIC | Facility: CLINIC | Age: 55
End: 2025-07-29
Payer: COMMERCIAL

## 2025-07-29 DIAGNOSIS — K14.6 TONGUE BURNING SENSATION: ICD-10-CM

## 2025-07-29 DIAGNOSIS — K14.8 LESION OF TONGUE: Primary | ICD-10-CM

## 2025-07-29 NOTE — TELEPHONE ENCOUNTER
Patient called requesting if she can get another referral that treats dx K14.8. She also stated she completed her appt with [facility] but they did not do much of dx. She has called her insurance fighting to see which Oral and Maxillofacial Surgeon that accepts her insurance in [place] and none that does. Patient does not know where to turn. Please advise.

## 2025-08-02 DIAGNOSIS — G62.9 NEUROPATHY: ICD-10-CM

## 2025-08-02 DIAGNOSIS — G25.81 RLS (RESTLESS LEGS SYNDROME): ICD-10-CM

## 2025-08-04 RX ORDER — GABAPENTIN 600 MG/1
600 TABLET ORAL NIGHTLY
Qty: 90 TABLET | Refills: 0 | Status: SHIPPED | OUTPATIENT
Start: 2025-08-04

## 2025-08-11 ENCOUNTER — LAB (OUTPATIENT)
Dept: LAB | Facility: HOSPITAL | Age: 55
End: 2025-08-11
Payer: COMMERCIAL

## 2025-08-11 DIAGNOSIS — G62.9 NEUROPATHY: ICD-10-CM

## 2025-08-11 DIAGNOSIS — E03.9 HYPOTHYROIDISM, UNSPECIFIED TYPE: ICD-10-CM

## 2025-08-11 LAB
T4 FREE SERPL-MCNC: 1.16 NG/DL (ref 0.92–1.68)
TSH SERPL DL<=0.05 MIU/L-ACNC: 2.68 UIU/ML (ref 0.27–4.2)

## 2025-08-11 PROCEDURE — 80307 DRUG TEST PRSMV CHEM ANLYZR: CPT

## 2025-08-11 PROCEDURE — 84443 ASSAY THYROID STIM HORMONE: CPT

## 2025-08-11 PROCEDURE — 84439 ASSAY OF FREE THYROXINE: CPT

## 2025-08-11 PROCEDURE — 36415 COLL VENOUS BLD VENIPUNCTURE: CPT

## 2025-08-12 LAB
AMPHETAMINES UR QL SCN: NEGATIVE NG/ML
BARBITURATES UR QL SCN: NEGATIVE NG/ML
BENZODIAZ UR QL SCN: NEGATIVE NG/ML
BZE UR QL SCN: NEGATIVE NG/ML
CANNABINOIDS UR QL SCN: NEGATIVE NG/ML
CREAT UR-MCNC: 43.7 MG/DL (ref 20–300)
FENTANYL UR-MCNC: NEGATIVE PG/ML
LABORATORY COMMENT REPORT: NORMAL
MEPERIDINE UR QL: NEGATIVE NG/ML
METHADONE UR QL SCN: NEGATIVE NG/ML
OPIATES UR QL SCN: NEGATIVE NG/ML
OXYCODONE+OXYMORPHONE UR QL SCN: NEGATIVE NG/ML
PCP UR QL: NEGATIVE NG/ML
PH UR: 8 [PH] (ref 4.5–8.9)
PROPOXYPH UR QL SCN: NEGATIVE NG/ML
SP GR UR: 1.01
TRAMADOL UR QL SCN: NEGATIVE NG/ML

## (undated) DEVICE — GLV SURG SENSICARE MICRO PF LF 6.5 STRL

## (undated) DEVICE — BUR DISSCT FLUT MATCHSTK 3MM

## (undated) DEVICE — SUT VIC 0/0 UR6 27IN DYED J603H

## (undated) DEVICE — DRP MICROSCP LECIA W/CLEARLENS 137X381CM

## (undated) DEVICE — GAMMEX® NON-LATEX SIZE 7.5, STERILE NEOPRENE POWDER-FREE SURGICAL GLOVE: Brand: GAMMEX

## (undated) DEVICE — TUBING, SUCTION, 1/4" X 20', STRAIGHT: Brand: MEDLINE INDUSTRIES, INC.

## (undated) DEVICE — PENCL E/S HNDSWCH ROCKR CB

## (undated) DEVICE — GAUZE,SPONGE,4"X4",16PLY,STRL,LF,10/TRAY: Brand: MEDLINE

## (undated) DEVICE — SYR LL TP 10ML STRL

## (undated) DEVICE — 3M(TM) TEGADERM(TM) IV TRANSPARENT FILM DRESSING WITH BORDER 1650: Brand: 3M™ TEGADERM™

## (undated) DEVICE — Device

## (undated) DEVICE — BLANKT WARM PACU MISTRAL/AIR PREM REFL A/ 85.8X50IN

## (undated) DEVICE — SUT VIC 2/0 CT1 CR8 18IN J839D

## (undated) DEVICE — GLV SURG BIOGEL LTX PF 7 1/2

## (undated) DEVICE — LAMINECTOMY CERVICAL DISC-LF: Brand: MEDLINE INDUSTRIES, INC.

## (undated) DEVICE — CUFF SCD HEMOFORCE SEQ CALF STD MD

## (undated) DEVICE — MEDI-VAC YANKAUER SUCTION HANDLE W/BULBOUS TIP: Brand: CARDINAL HEALTH

## (undated) DEVICE — OSC HYSTEROSCOPY: Brand: MEDLINE INDUSTRIES, INC.

## (undated) DEVICE — SLV SCD KN/LEN ADJ EXPRSS BLENDED MD 1P/U

## (undated) DEVICE — 3M™ STERI-DRAPE™ INSTRUMENT POUCH 1018: Brand: STERI-DRAPE™

## (undated) DEVICE — ANTIBACTERIAL UNDYED BRAIDED (POLYGLACTIN 910), SYNTHETIC ABSORBABLE SUTURE: Brand: COATED VICRYL

## (undated) DEVICE — STERILE POLYISOPRENE POWDER-FREE SURGICAL GLOVES: Brand: PROTEXIS

## (undated) DEVICE — STERILE POLYISOPRENE POWDER-FREE SURGICAL GLOVES WITH EMOLLIENT COATING: Brand: PROTEXIS

## (undated) DEVICE — GLV SURG TRIUMPH CLASSIC PF LTX 7 STRL

## (undated) DEVICE — PAD GRND REM POLYHESIVE A/ DISP